# Patient Record
Sex: MALE | Race: BLACK OR AFRICAN AMERICAN | NOT HISPANIC OR LATINO | Employment: OTHER | URBAN - METROPOLITAN AREA
[De-identification: names, ages, dates, MRNs, and addresses within clinical notes are randomized per-mention and may not be internally consistent; named-entity substitution may affect disease eponyms.]

---

## 2018-06-05 ENCOUNTER — HOSPITAL ENCOUNTER (OUTPATIENT)
Dept: RADIOLOGY | Facility: HOSPITAL | Age: 63
Discharge: HOME | End: 2018-06-05
Attending: ORTHOPAEDIC SURGERY
Payer: MEDICAID

## 2018-06-05 DIAGNOSIS — M17.12 DEGENERATIVE ARTHRITIS OF LEFT KNEE: ICD-10-CM

## 2018-06-05 DIAGNOSIS — M17.12 DEGENERATIVE ARTHRITIS OF LEFT KNEE: Primary | ICD-10-CM

## 2018-06-05 PROCEDURE — 73562 X-RAY EXAM OF KNEE 3: CPT | Mod: 26,50,, | Performed by: RADIOLOGY

## 2018-06-05 PROCEDURE — 73562 X-RAY EXAM OF KNEE 3: CPT | Mod: 50,TC,FY

## 2019-11-04 ENCOUNTER — TELEPHONE (OUTPATIENT)
Dept: SURGERY | Facility: HOSPITAL | Age: 64
End: 2019-11-04

## 2019-11-11 ENCOUNTER — LAB VISIT (OUTPATIENT)
Dept: LAB | Facility: OTHER | Age: 64
End: 2019-11-11
Payer: MEDICARE

## 2019-11-11 DIAGNOSIS — B17.9 ACUTE HEPATITIS: ICD-10-CM

## 2019-11-11 DIAGNOSIS — R79.1: ICD-10-CM

## 2019-11-11 DIAGNOSIS — M17.10 PRIMARY LOCALIZED OSTEOARTHROSIS, LOWER LEG: ICD-10-CM

## 2019-11-11 DIAGNOSIS — R73.09 ELEVATED HEMOGLOBIN A1C: ICD-10-CM

## 2019-11-11 DIAGNOSIS — R79.1 ABNORMAL PARTIAL THROMBOPLASTIN TIME (PTT): ICD-10-CM

## 2019-11-11 DIAGNOSIS — Z01.818 OTHER SPECIFIED PRE-OPERATIVE EXAMINATION: Primary | ICD-10-CM

## 2019-11-11 LAB
ALBUMIN SERPL BCP-MCNC: 3.5 G/DL (ref 3.5–5.2)
ALP SERPL-CCNC: 52 U/L (ref 55–135)
ALT SERPL W/O P-5'-P-CCNC: 25 U/L (ref 10–44)
ANION GAP SERPL CALC-SCNC: 7 MMOL/L (ref 8–16)
APTT BLDCRRT: 28.5 SEC (ref 21–32)
AST SERPL-CCNC: 19 U/L (ref 10–40)
BASOPHILS # BLD AUTO: 0.02 K/UL (ref 0–0.2)
BASOPHILS NFR BLD: 0.4 % (ref 0–1.9)
BILIRUB SERPL-MCNC: 0.5 MG/DL (ref 0.1–1)
BUN SERPL-MCNC: 23 MG/DL (ref 8–23)
CALCIUM SERPL-MCNC: 8.6 MG/DL (ref 8.7–10.5)
CHLORIDE SERPL-SCNC: 107 MMOL/L (ref 95–110)
CO2 SERPL-SCNC: 26 MMOL/L (ref 23–29)
CREAT SERPL-MCNC: 1.2 MG/DL (ref 0.5–1.4)
CRP SERPL-MCNC: 2.9 MG/L (ref 0–8.2)
DIFFERENTIAL METHOD: ABNORMAL
EOSINOPHIL # BLD AUTO: 0.1 K/UL (ref 0–0.5)
EOSINOPHIL NFR BLD: 0.9 % (ref 0–8)
ERYTHROCYTE [DISTWIDTH] IN BLOOD BY AUTOMATED COUNT: 12.9 % (ref 11.5–14.5)
ERYTHROCYTE [SEDIMENTATION RATE] IN BLOOD: 2 MM/HR (ref 0–10)
EST. GFR  (AFRICAN AMERICAN): >60 ML/MIN/1.73 M^2
EST. GFR  (NON AFRICAN AMERICAN): >60 ML/MIN/1.73 M^2
ESTIMATED AVG GLUCOSE: 105 MG/DL (ref 68–131)
GLUCOSE SERPL-MCNC: 78 MG/DL (ref 70–110)
HAV IGM SERPL QL IA: NEGATIVE
HBA1C MFR BLD HPLC: 5.3 % (ref 4–5.6)
HBV CORE IGM SERPL QL IA: NEGATIVE
HBV SURFACE AG SERPL QL IA: NEGATIVE
HCT VFR BLD AUTO: 41.7 % (ref 40–54)
HCV AB SERPL QL IA: NEGATIVE
HGB BLD-MCNC: 14.4 G/DL (ref 14–18)
IMM GRANULOCYTES # BLD AUTO: 0.02 K/UL (ref 0–0.04)
IMM GRANULOCYTES NFR BLD AUTO: 0.4 % (ref 0–0.5)
INR PPP: 0.9 (ref 0.8–1.2)
LYMPHOCYTES # BLD AUTO: 0.8 K/UL (ref 1–4.8)
LYMPHOCYTES NFR BLD: 13.4 % (ref 18–48)
MCH RBC QN AUTO: 33.8 PG (ref 27–31)
MCHC RBC AUTO-ENTMCNC: 34.5 G/DL (ref 32–36)
MCV RBC AUTO: 98 FL (ref 82–98)
MONOCYTES # BLD AUTO: 0.7 K/UL (ref 0.3–1)
MONOCYTES NFR BLD: 11.8 % (ref 4–15)
NEUTROPHILS # BLD AUTO: 4.2 K/UL (ref 1.8–7.7)
NEUTROPHILS NFR BLD: 73.1 % (ref 38–73)
NRBC BLD-RTO: 0 /100 WBC
PLATELET # BLD AUTO: 161 K/UL (ref 150–350)
PMV BLD AUTO: 9.9 FL (ref 9.2–12.9)
POTASSIUM SERPL-SCNC: 4.2 MMOL/L (ref 3.5–5.1)
PREALB SERPL-MCNC: 23 MG/DL (ref 20–43)
PROCALCITONIN SERPL IA-MCNC: 0.03 NG/ML
PROT SERPL-MCNC: 6.6 G/DL (ref 6–8.4)
PROTHROMBIN TIME: 10.5 SEC (ref 9–12.5)
RBC # BLD AUTO: 4.26 M/UL (ref 4.6–6.2)
SODIUM SERPL-SCNC: 140 MMOL/L (ref 136–145)
WBC # BLD AUTO: 5.68 K/UL (ref 3.9–12.7)

## 2019-11-11 PROCEDURE — 83036 HEMOGLOBIN GLYCOSYLATED A1C: CPT

## 2019-11-11 PROCEDURE — 85610 PROTHROMBIN TIME: CPT

## 2019-11-11 PROCEDURE — 36415 COLL VENOUS BLD VENIPUNCTURE: CPT

## 2019-11-11 PROCEDURE — 83520 IMMUNOASSAY QUANT NOS NONAB: CPT

## 2019-11-11 PROCEDURE — 80074 ACUTE HEPATITIS PANEL: CPT

## 2019-11-11 PROCEDURE — 85730 THROMBOPLASTIN TIME PARTIAL: CPT

## 2019-11-11 PROCEDURE — 84145 PROCALCITONIN (PCT): CPT

## 2019-11-11 PROCEDURE — 84134 ASSAY OF PREALBUMIN: CPT

## 2019-11-11 PROCEDURE — 85651 RBC SED RATE NONAUTOMATED: CPT

## 2019-11-11 PROCEDURE — 80053 COMPREHEN METABOLIC PANEL: CPT

## 2019-11-11 PROCEDURE — 86140 C-REACTIVE PROTEIN: CPT

## 2019-11-11 PROCEDURE — 85025 COMPLETE CBC W/AUTO DIFF WBC: CPT

## 2019-11-13 ENCOUNTER — OFFICE VISIT (OUTPATIENT)
Dept: FAMILY MEDICINE | Facility: HOSPITAL | Age: 64
End: 2019-11-13
Attending: FAMILY MEDICINE
Payer: MEDICARE

## 2019-11-13 ENCOUNTER — HOSPITAL ENCOUNTER (OUTPATIENT)
Dept: PREADMISSION TESTING | Facility: HOSPITAL | Age: 64
Discharge: HOME | End: 2019-11-13
Attending: ORTHOPAEDIC SURGERY
Payer: MEDICARE

## 2019-11-13 ENCOUNTER — ANESTHESIA EVENT (OUTPATIENT)
Dept: SURGERY | Facility: HOSPITAL | Age: 64
End: 2019-11-13
Payer: MEDICARE

## 2019-11-13 VITALS
WEIGHT: 315 LBS | HEART RATE: 70 BPM | HEIGHT: 73 IN | BODY MASS INDEX: 41.75 KG/M2 | DIASTOLIC BLOOD PRESSURE: 80 MMHG | SYSTOLIC BLOOD PRESSURE: 130 MMHG

## 2019-11-13 DIAGNOSIS — Z01.818 PREOPERATIVE EXAMINATION: ICD-10-CM

## 2019-11-13 DIAGNOSIS — Z01.818 PREOP TESTING: Primary | ICD-10-CM

## 2019-11-13 DIAGNOSIS — Z01.818 PREOP EXAMINATION: Primary | ICD-10-CM

## 2019-11-13 DIAGNOSIS — Z78.9 ALCOHOL USE: ICD-10-CM

## 2019-11-13 DIAGNOSIS — Z23 NEED FOR PROPHYLACTIC VACCINATION AND INOCULATION AGAINST INFLUENZA: ICD-10-CM

## 2019-11-13 DIAGNOSIS — M17.0 PRIMARY OSTEOARTHRITIS OF BOTH KNEES: Primary | ICD-10-CM

## 2019-11-13 PROCEDURE — 99213 OFFICE O/P EST LOW 20 MIN: CPT | Performed by: STUDENT IN AN ORGANIZED HEALTH CARE EDUCATION/TRAINING PROGRAM

## 2019-11-13 PROCEDURE — 90662 IIV NO PRSV INCREASED AG IM: CPT

## 2019-11-13 RX ORDER — HYDROXYZINE HYDROCHLORIDE 25 MG/1
25 TABLET, FILM COATED ORAL NIGHTLY PRN
Refills: 5 | COMMUNITY
Start: 2019-11-05 | End: 2023-01-04

## 2019-11-13 RX ORDER — CELECOXIB 100 MG/1
400 CAPSULE ORAL
Status: CANCELLED | OUTPATIENT
Start: 2019-12-09

## 2019-11-13 RX ORDER — CEFAZOLIN SODIUM 2 G/50ML
2 SOLUTION INTRAVENOUS
Status: CANCELLED | OUTPATIENT
Start: 2019-12-09

## 2019-11-13 RX ORDER — ACETAMINOPHEN 500 MG
1000 TABLET ORAL
Status: CANCELLED | OUTPATIENT
Start: 2019-12-09

## 2019-11-13 RX ORDER — LOSARTAN POTASSIUM 50 MG/1
50 TABLET ORAL
COMMUNITY

## 2019-11-13 RX ORDER — TRANEXAMIC ACID 650 MG/1
1950 TABLET ORAL
Status: CANCELLED | OUTPATIENT
Start: 2019-12-09

## 2019-11-13 RX ORDER — PREGABALIN 75 MG/1
150 CAPSULE ORAL
Status: CANCELLED | OUTPATIENT
Start: 2019-12-09

## 2019-11-13 RX ORDER — TALC
3 POWDER (GRAM) TOPICAL
COMMUNITY
End: 2023-01-04

## 2019-11-13 RX ORDER — SILDENAFIL 100 MG/1
TABLET, FILM COATED ORAL
Refills: 0 | COMMUNITY
Start: 2019-09-25 | End: 2023-01-04

## 2019-11-13 NOTE — DISCHARGE INSTRUCTIONS
Your surgery is scheduled for 12/9/19.    Please report to Procedure Check In Room on the 2nd FLOOR at 5:30a.m.          INSTRUCTIONS IMPORTANT!!!  ¨ Do not eat or drink after 12 midnight-including water. OK to brush teeth, no   gum, candy or mints!          ____  Proceed to [facility] on 11/13/19 for additional testing.      ____  No powder, lotions or creams to surgical area.  ____  Please remove all jewelry, including piercings and leave at home.  ____  No money or valuables needed. Please leave at home.  ____  Please bring any documents given by your doctor.  ____  If going home the same day, arrange for a ride home. You will not be able to             drive if Anesthesia was used.  ____  Wear loose fitting clothing. Allow for dressings, bandages.  ____  Stop Aspirin, Ibuprofen, Motrin and Aleve at least 3-5 days before surgery, unless otherwise instructed by your doctor, or the nurse.   You MAY use Tylenol/acetaminophen until day of surgery.  ____  Wash the surgical area with Hibiclens the night before surgery, and again the             morning of surgery.  Be sure to rinse hibiclens off completely (if instructed by   nurse).  ____  If you take diabetic medication, do not take am of surgery unless instructed by Doctor.  ____  Call MD for temperature above 101 degrees or any other signs of infection such as Urinary (bladder) infection, Upper respiratory infection, skin boils, etc.  ____ Stop taking any Fish Oil supplement or any Vitamins that contain Vitamin E at least 5 days prior to surgery.  ____ Do Not wear your contact lenses the day of your procedure.  You may wear your glasses.      ____Do not shave surgical site for 3 days prior to surgery.  ____ Practice Good hand washing before, during, and after procedure.      I have read or had read and explained to me, and understand the above information.  Additional comments or instructions:  For additional questions call [phone]      ANESTHESIA  SIDE EFFECTS  -For the first 24 hours after surgery:  Do not drive, use heavy equipment, make important decisions, or drink alcohol  -It is normal to feel sleepy for several hours.  Rest until you are more awake.  -Have someone stay with you, if needed.  They can watch for problems and help keep you safe.  -Some possible post anesthesia side effects include: nausea and vomiting, sore throat and hoarseness, sleepiness, and dizziness.        Pre-Op Bathing Instructions    Before surgery, you can play an important role in your own health.    Because skin is not sterile, we need to be sure that your skin is as free of germs as possible. By following the instructions below, you can reduce the number of germs on your skin before surgery.    IMPORTANT: You will need to shower with a special soap called Hibiclens*, available at any pharmacy.  If you are allergic to Chlorhexidine (the antiseptic in Hibiclens), use an antibacterial soap such as Dial Soap for your preoperative shower.  You will shower with Hibiclens both the night before your surgery and the morning of your surgery.  Do not use Hibiclens on the head, face or genitals to avoid injury to those areas.    STEP #1: THE NIGHT BEFORE YOUR SURGERY     1. Do not shave the area of your body where your surgery will be performed.  2. Shower and wash your hair and body as usual with your normal soap and shampoo.  3. Rinse your hair and body thoroughly after you shower to remove all soap residue.  4. With your hand, apply one packet of Hibiclens soap to the surgical site.   5. Wash the site gently for five (5) minutes. Do not scrub your skin too hard.   6. Do not wash with your regular soap after Hibiclens is used.  7. Rinse your body thoroughly.  8. Pat yourself dry with a clean, soft towel.  9. Do not use lotion, cream, or powder.  10. Wear clean clothes.    STEP #2: THE MORNING OF YOUR SURGERY     1. Repeat Step #1.    * Not to be used by people allergic to  Chlorhexidine.          Total Knee Replacement  During total knee replacement surgery, your damaged knee joint is replaced with an artificial joint, called a prosthesis. This surgery almost always reduces joint pain and improves your quality of life.     The parts of the prosthesis are secured to the bones of the knee. Together they form the new joint.   Before your surgery  You will most likely arrive at the hospital on the morning of the surgery. Be sure to follow all of your doctors instructions on preparing for surgery:  · Follow any directions you are given for taking medicines or for not eating or drinking before surgery.  · At the hospital, your temperature, pulse, breathing, and blood pressure will be checked.  · An IV (intravenous) line will be started to provide fluids and medicines needed during surgery.  The surgical procedure  When the surgical team is ready, youll be taken to the operating room. There youll be given anesthesia to help you sleep through surgery, or to make you numb from the waist down. Then an incision is made on the front or side of your knee. Any damaged bone is cleaned away, and the new joint components are put into place. The incision is closed with surgical staples or stitches.  After your surgery  After surgery, youll be sent to the recovery room. When you are fully awake, youll be moved to your room. The nurses will give you medicines to ease your pain. You may have a catheter (small tube) in your bladder. A continuous passive motion machine may be used on your knee to keep it from getting stiff. A sequential compression machine may be used to prevent blood clots by gently squeezing then releasing your leg. You may be given medicine to prevent blood clots. Soon, healthcare providers will help you get up and moving.  When to call your doctor  Once at home, call your doctor if you have any of the symptoms below:  · An increase in knee pain  · Pain or swelling in the calf or  leg  · Unusual redness, heat, or drainage at the incision site  · Fever of 100.4°F (38°C) or higher  · Shaking chills  · Trouble breathing or chest pain (call 911)   Date Last Reviewed: 9/20/2015  © 5675-3619 [employer]. [address] [place] [id number]. All rights reserved. This information is not intended as a substitute for professional medical care. Always follow your healthcare professional's instructions.

## 2019-11-13 NOTE — PRE-PROCEDURE INSTRUCTIONS
Pt states he spoke with Dior on 11/12 to cancel the surgery on 11/25 for the left knee and would be having surgery on 12/9/ on the right knee.  Schedule reflects both surgeries remain scheduled.  Message left for Dior to have the surgery on 11/25 cancelled.

## 2019-11-13 NOTE — PRE-PROCEDURE INSTRUCTIONS
Pt is arranging ride home    Allergies, medical, surgical, family and psychosocial histories reviewed with patient. Periop plan of care reviewed. Preop instructions given, including medications to take and to hold. Hibiclens soap and instructions on use given. Time allotted for questions to be addressed.  Patient verbalized understanding.

## 2019-11-13 NOTE — ANESTHESIA PREPROCEDURE EVALUATION
"                                                                                                             11/13/2019  Sy Aguiar is a 63 y.o., male scheduled for right TKA under spinal/MAC/gen on 12/9/19.    Family med optimization in Epic.  " Patient is low risk for intermediate risk surgery."    Past Medical History:   Diagnosis Date    Asthma     Hypertension      Past Surgical History:   Procedure Laterality Date    BACK SURGERY      ELBOW SURGERY Right     KNEE SURGERY      quad muscle tear repair       Anesthesia Evaluation    I have reviewed the Patient Summary Reports.    I have reviewed the Nursing Notes.   I have reviewed the Medications.     Review of Systems  Anesthesia Hx:  Denies Family Hx of Anesthesia complications.  Personal Hx of Anesthesia complications  Severe Sore Throat after Anesthesia   Social:  Former Smoker, Alcohol Use 3-4 drinks daily   Hematology/Oncology:  Hematology Normal        Cardiovascular:   Hypertension, well controlled  Denies Angina.    Pulmonary:  Pulmonary Normal  Denies Asthma.  Denies Shortness of breath.    Renal/:  Renal/ Normal     Hepatic/GI:  Hepatic/GI Normal    Neurological:  Neurology Normal    Endocrine:  Endocrine Normal        Physical Exam  General:  Obesity    Airway/Jaw/Neck:  Airway Findings: Mouth Opening: Normal Tongue: Normal  General Airway Assessment: Adult  Mallampati: III  TM Distance: Normal, at least 6 cm       Chest/Lungs:  Chest/Lungs Findings: Clear to auscultation, Normal Respiratory Rate     Heart/Vascular:  Heart Findings: Rate: Normal  Rhythm: Regular Rhythm  Sounds: Normal        Mental Status:  Mental Status Findings:  Cooperative, Alert and Oriented         Anesthesia Plan  Type of Anesthesia, risks & benefits discussed:  Anesthesia Type:  general, MAC, regional, spinal  Patient's Preference:   Intra-op Monitoring Plan: standard ASA monitors  Intra-op Monitoring Plan Comments:   Post Op Pain Control Plan: multimodal " analgesia  Post Op Pain Control Plan Comments:   Induction:   IV  Beta Blocker:  Patient is not currently on a Beta-Blocker (No further documentation required).       Informed Consent: Patient understands risks and agrees with Anesthesia plan.  Questions answered. Anesthesia consent signed with patient.  ASA Score: 3     Day of Surgery Review of History & Physical:  There are no significant changes.      Anesthesia Plan Notes: Anesthesia consent to be signed prior to procedure on 12/9/19  As of 11/14, patient has not completed EKG or CXR.  Attempted to contact patient, unable to leave message.          Ready For Surgery From Anesthesia Perspective.

## 2019-11-13 NOTE — H&P (VIEW-ONLY)
Subjective:       Patient ID: Sy Aguiar is a 63 y.o. male.    Chief Complaint: Pre-op Exam    HPI Patient is a 62yo male with PMHx of HTN and Osteoarthritis who presents to the clinic for a pre-operative exam for a TKA on 11/25/2019. Patient has no symptoms today other than chronic bilateral lower extremity swelling. He takes losartan for HTN management. He does not wear compression socks or elevate his legs at night. Patient also states he does drink everyday, sometimes up to 3-4 mixed drinks and wine per night.     Duke Activity Score: 5.31.     Review of Systems   Constitutional: Negative for activity change and fatigue.   HENT: Negative for hearing loss and trouble swallowing.    Eyes: Negative for visual disturbance.   Respiratory: Negative for cough and shortness of breath.    Cardiovascular: Positive for leg swelling (chronic, BLE). Negative for chest pain.   Gastrointestinal: Negative for abdominal pain, constipation, diarrhea, nausea and vomiting.   Genitourinary: Negative for difficulty urinating.   Musculoskeletal: Negative for arthralgias and myalgias.   Neurological: Negative for dizziness, light-headedness, numbness and headaches.   Psychiatric/Behavioral: Negative for decreased concentration and sleep disturbance. The patient is not nervous/anxious.        Objective:      Vitals:    11/13/19 0938   BP: 130/80   Pulse: 70     Physical Exam   Constitutional: He is oriented to person, place, and time. He appears well-developed and well-nourished.   HENT:   Head: Normocephalic and atraumatic.   Eyes: Pupils are equal, round, and reactive to light. Conjunctivae and EOM are normal.   Cardiovascular: Normal rate, regular rhythm, normal heart sounds and intact distal pulses.   Pulmonary/Chest: Effort normal and breath sounds normal.   Abdominal: Soft. Bowel sounds are normal.   Musculoskeletal: Normal range of motion. He exhibits edema (1+ edema bilateral lower extremities).   Neurological: He is  alert and oriented to person, place, and time.   Skin: Skin is warm and dry. Capillary refill takes less than 2 seconds.   Psychiatric: He has a normal mood and affect. His behavior is normal. Judgment and thought content normal.   Vitals reviewed.      Assessment:       1. Primary osteoarthritis of both knees    2. Preoperative examination    3. Need for prophylactic vaccination and inoculation against influenza    4. Alcohol use        Plan:       Primary osteoarthritis of both knees  Preoperative examination        -    Patient is low risk for intermediate risk surgery.         -    Recommended compression socks and elevation of legs while sleeping to help with bilateral lower extremity swelling.     Alcohol Use         -    Discussed alcohol use with patient. CAGE negative. Patient endorses understanding that he drinks above what is recommended and is in the process of decreasing his intake.     Need for prophylactic vaccination and inoculation against influenza  -     Flu Vaccine - High Dose (PF) (65+)    -Patient also needs pneumonia vaccine in the future.     Follow up if symptoms worsen or fail to improve.

## 2019-11-13 NOTE — PROGRESS NOTES
Two patient identifiers verified.  Allergies reviewed. Flu vaccine  IM administered to Left deltoid per MD order.  Patient tolerated injection well; no redness, bleeding, or bruising noted to injection site.  Patient instructed to remain in clinic setting for 15 minutes.  Verbalizes understanding.  Flu consent signed by patient.

## 2019-11-14 ENCOUNTER — HOSPITAL ENCOUNTER (OUTPATIENT)
Dept: RADIOLOGY | Facility: HOSPITAL | Age: 64
Discharge: HOME | End: 2019-11-14
Attending: ORTHOPAEDIC SURGERY
Payer: MEDICARE

## 2019-11-14 ENCOUNTER — CLINICAL SUPPORT (OUTPATIENT)
Dept: LAB | Facility: HOSPITAL | Age: 64
End: 2019-11-14
Attending: ORTHOPAEDIC SURGERY
Payer: MEDICARE

## 2019-11-14 DIAGNOSIS — Z01.818 PREOP EXAMINATION: ICD-10-CM

## 2019-11-14 LAB — IL6 SERPL-MCNC: 2.4 PG/ML

## 2019-11-14 PROCEDURE — 93005 ELECTROCARDIOGRAM TRACING: CPT

## 2019-11-14 PROCEDURE — 71046 XR CHEST PA AND LATERAL: ICD-10-PCS | Mod: 26,,, | Performed by: RADIOLOGY

## 2019-11-14 PROCEDURE — 71046 X-RAY EXAM CHEST 2 VIEWS: CPT | Mod: TC,FY

## 2019-11-14 PROCEDURE — 71046 X-RAY EXAM CHEST 2 VIEWS: CPT | Mod: 26,,, | Performed by: RADIOLOGY

## 2019-11-29 ENCOUNTER — OFFICE VISIT (OUTPATIENT)
Dept: URGENT CARE | Facility: CLINIC | Age: 64
End: 2019-11-29
Payer: MEDICARE

## 2019-11-29 VITALS
WEIGHT: 315 LBS | OXYGEN SATURATION: 97 % | HEIGHT: 73 IN | SYSTOLIC BLOOD PRESSURE: 137 MMHG | RESPIRATION RATE: 20 BRPM | TEMPERATURE: 98 F | DIASTOLIC BLOOD PRESSURE: 81 MMHG | HEART RATE: 71 BPM | BODY MASS INDEX: 41.75 KG/M2

## 2019-11-29 DIAGNOSIS — J20.9 ACUTE BRONCHITIS, UNSPECIFIED ORGANISM: Primary | ICD-10-CM

## 2019-11-29 PROCEDURE — 99203 OFFICE O/P NEW LOW 30 MIN: CPT | Mod: S$GLB,,, | Performed by: NURSE PRACTITIONER

## 2019-11-29 PROCEDURE — 99203 PR OFFICE/OUTPT VISIT, NEW, LEVL III, 30-44 MIN: ICD-10-PCS | Mod: S$GLB,,, | Performed by: NURSE PRACTITIONER

## 2019-11-29 RX ORDER — GUAIFENESIN 600 MG/1
600 TABLET, EXTENDED RELEASE ORAL 2 TIMES DAILY
Qty: 30 TABLET | Refills: 0 | Status: SHIPPED | OUTPATIENT
Start: 2019-11-29 | End: 2023-01-04

## 2019-11-29 RX ORDER — ALBUTEROL SULFATE 90 UG/1
2 AEROSOL, METERED RESPIRATORY (INHALATION) EVERY 6 HOURS PRN
Qty: 1 INHALER | Refills: 0 | Status: SHIPPED | OUTPATIENT
Start: 2019-11-29 | End: 2023-01-04

## 2019-11-29 RX ORDER — PREDNISONE 20 MG/1
20 TABLET ORAL DAILY
Qty: 5 TABLET | Refills: 0 | Status: SHIPPED | OUTPATIENT
Start: 2019-11-29 | End: 2019-12-04

## 2019-11-29 NOTE — PROGRESS NOTES
"Subjective:       Patient ID: Sy Aguiar is a 63 y.o. male.    Vitals:  height is 6' 1" (1.854 m) and weight is 147.9 kg (326 lb) (abnormal). His temperature is 98.1 °F (36.7 °C). His blood pressure is 137/81 and his pulse is 71. His respiration is 20 and oxygen saturation is 97%.     Chief Complaint: Sinus Problem    Pt states for two days he has been having a wheezing in his chest and a cough. He does have a Hx of bronchitist. He is coughing up yellow mucus. Pt hasn't taken anything for the cough.    Sinus Problem   This is a new problem. Episode onset: 2 days ago. The problem is unchanged. There has been no fever. He is experiencing no pain. Associated symptoms include coughing. Pertinent negatives include no chills, congestion, headaches, shortness of breath or sore throat. Past treatments include nothing.       Constitution: Negative for chills, fatigue and fever.   HENT: Negative for congestion and sore throat.    Neck: Negative for painful lymph nodes.   Cardiovascular: Negative for chest pain and leg swelling.   Eyes: Negative for double vision and blurred vision.   Respiratory: Positive for cough. Negative for shortness of breath.    Gastrointestinal: Negative for nausea, vomiting and diarrhea.   Genitourinary: Negative for dysuria, frequency and urgency.   Musculoskeletal: Negative for joint pain, joint swelling, muscle cramps and muscle ache.   Skin: Negative for color change, pale and rash.   Allergic/Immunologic: Negative for seasonal allergies.   Neurological: Negative for dizziness, history of vertigo, light-headedness, passing out and headaches.   Hematologic/Lymphatic: Negative for swollen lymph nodes, easy bruising/bleeding and history of blood clots. Does not bruise/bleed easily.   Psychiatric/Behavioral: Negative for nervous/anxious, sleep disturbance and depression. The patient is not nervous/anxious.        Objective:      Physical Exam   Constitutional: He is oriented to person, " place, and time. He appears well-developed and well-nourished. He is cooperative.  Non-toxic appearance. He does not have a sickly appearance. He does not appear ill. No distress.   HENT:   Head: Normocephalic and atraumatic.   Right Ear: Hearing, tympanic membrane, external ear and ear canal normal.   Left Ear: Hearing, tympanic membrane, external ear and ear canal normal.   Nose: Nose normal. No mucosal edema, rhinorrhea or nasal deformity. No epistaxis. Right sinus exhibits no maxillary sinus tenderness and no frontal sinus tenderness. Left sinus exhibits no maxillary sinus tenderness and no frontal sinus tenderness.   Mouth/Throat: Uvula is midline, oropharynx is clear and moist and mucous membranes are normal. No trismus in the jaw. Normal dentition. No uvula swelling. No oropharyngeal exudate, posterior oropharyngeal edema or posterior oropharyngeal erythema.   Eyes: Conjunctivae and lids are normal. No scleral icterus.   Neck: Trachea normal, full passive range of motion without pain and phonation normal. Neck supple. No neck rigidity. No edema and no erythema present.   Cardiovascular: Normal rate, regular rhythm, normal heart sounds, intact distal pulses and normal pulses.   Pulmonary/Chest: Effort normal and breath sounds normal. No respiratory distress. He has no decreased breath sounds. He has no rhonchi.   Abdominal: Normal appearance.   Musculoskeletal: Normal range of motion. He exhibits no edema or deformity.   Neurological: He is alert and oriented to person, place, and time. He exhibits normal muscle tone. Coordination normal.   Skin: Skin is warm, dry, intact, not diaphoretic and not pale.   Psychiatric: He has a normal mood and affect. His speech is normal and behavior is normal. Judgment and thought content normal. Cognition and memory are normal.   Nursing note and vitals reviewed.        Assessment:       1. Acute bronchitis, unspecified organism        Plan:         Acute bronchitis,  unspecified organism  -     predniSONE (DELTASONE) 20 MG tablet; Take 1 tablet (20 mg total) by mouth once daily. for 5 days  Dispense: 5 tablet; Refill: 0  -     albuterol (VENTOLIN HFA) 90 mcg/actuation inhaler; Inhale 2 puffs into the lungs every 6 (six) hours as needed for Wheezing. Rescue  Dispense: 1 Inhaler; Refill: 0      Patient Instructions   Use an antihistamine such as Claritin, Zyrtec or Allegra to dry you out.     Start prednisone daily for 5 days.  Use albuterol inhaler as needed for shortness of breath and wheezing every 4-6 hours.    Use mucinex (guaifenisin) to break up mucous up to 2400mg/day to loosen any mucous. The mucinex DM pill has a cough suppressant that can be used at night to stop the tickle at the back of your throat.    Use Flonase 1-2 sprays/nostril per day. It is a local acting steroid nasal spray, if you develop a bloody nose, stop using the medication immediately.    Use warm salt water gargles to ease your throat pain. Warm salt water gargles as needed for sore throat-  1/2 tsp salt to 1 cup warm water, gargle as desired. Hot tea with honey.     Drink lots of fluids and get rest.  Patient to follow if symptoms persist or worsen.      Bronchitis, Viral (Adult)    You have a viral bronchitis. Bronchitis is inflammation and swelling of the lining of the lungs. This is often caused by an infection. Symptoms include a dry, hacking cough that is worse at night. The cough may bring up yellow-green mucus. You may also feel short of breath or wheeze. Other symptoms may include tiredness, chest discomfort, and chills.  Bronchitis that is caused by a virus is not treated with antibiotics. Instead, medicines may be given to help relieve symptoms. Symptoms can last up to 2 weeks, although the cough may last much longer.  This illness is contagious during the first few days and is spread through the air by coughing and sneezing, or by direct contact (touching the sick person and then touching  your own eyes, nose, or mouth).  Most viral illnesses resolve within 10 to 14 days with rest and simple home remedies, although they may sometimes last for several weeks.  Home care  · If symptoms are severe, rest at home for the first 2 to 3 days. When you go back to your usual activities, don't let yourself get too tired.  · Do not smoke. Also avoid being exposed to secondhand smoke.  · You may use over-the-counter medicine to control fever or pain, unless another pain medicine was prescribed. (Note: If you have chronic liver or kidney disease or have ever had a stomach ulcer or gastrointestinal bleeding, talk with your healthcare provider before using these medicines. Also talk to your provider if you are taking medicine to prevent blood clots.) Aspirin should never be given to anyone younger than 18 years of age who is ill with a viral infection or fever. It may cause severe liver or brain damage.  · Your appetite may be poor, so a light diet is fine. Avoid dehydration by drinking 6 to 8 glasses of fluids per day (such as water, soft drinks, sports drinks, juices, tea, or soup). Extra fluids will help loosen secretions in the nose and lungs.  · Over-the-counter cough, cold, and sore-throat medicines will not shorten the length of the illness, but they may help to reduce symptoms. (Note: Do not use decongestants if you have high blood pressure.)  Follow-up care  Follow up with your healthcare provider, or as advised. If you had an X-ray or ECG (electrocardiogram), a specialist will review it. You will be notified of any new findings that may affect your care.  Note: If you are age 65 or older, or if you have a chronic lung disease or condition that affects your immune system, or you smoke, talk to your healthcare provider about having pneumococcal vaccinations and a yearly influenza vaccination (flu shot).  When to seek medical advice  Call your healthcare provider right away if any of these occur:  · Fever of  100.4°F (38°C) or higher  · Coughing up increased amounts of colored sputum  · Weakness, drowsiness, headache, facial pain, ear pain, or a stiff neck  Call 911, or get immediate medical care  Contact emergency services right away if any of these occur:  · Coughing up blood  · Worsening weakness, drowsiness, headache, or stiff neck  · Trouble breathing, wheezing, or pain with breathing  Date Last Reviewed: 9/13/2015 © 2000-2017 [employer]. [address] [place] [address]. All rights reserved. This information is not intended as a substitute for professional medical care. Always follow your healthcare professional's instructions.

## 2019-11-29 NOTE — PATIENT INSTRUCTIONS
Use an antihistamine such as Claritin, Zyrtec or Allegra to dry you out.     Start prednisone daily for 5 days.  Use albuterol inhaler as needed for shortness of breath and wheezing every 4-6 hours.    Use mucinex (guaifenisin) to break up mucous up to 2400mg/day to loosen any mucous. The mucinex DM pill has a cough suppressant that can be used at night to stop the tickle at the back of your throat.    Use Flonase 1-2 sprays/nostril per day. It is a local acting steroid nasal spray, if you develop a bloody nose, stop using the medication immediately.    Use warm salt water gargles to ease your throat pain. Warm salt water gargles as needed for sore throat-  1/2 tsp salt to 1 cup warm water, gargle as desired. Hot tea with honey.     Drink lots of fluids and get rest.  Patient to follow if symptoms persist or worsen.      Bronchitis, Viral (Adult)    You have a viral bronchitis. Bronchitis is inflammation and swelling of the lining of the lungs. This is often caused by an infection. Symptoms include a dry, hacking cough that is worse at night. The cough may bring up yellow-green mucus. You may also feel short of breath or wheeze. Other symptoms may include tiredness, chest discomfort, and chills.  Bronchitis that is caused by a virus is not treated with antibiotics. Instead, medicines may be given to help relieve symptoms. Symptoms can last up to 2 weeks, although the cough may last much longer.  This illness is contagious during the first few days and is spread through the air by coughing and sneezing, or by direct contact (touching the sick person and then touching your own eyes, nose, or mouth).  Most viral illnesses resolve within 10 to 14 days with rest and simple home remedies, although they may sometimes last for several weeks.  Home care  · If symptoms are severe, rest at home for the first 2 to 3 days. When you go back to your usual activities, don't let yourself get too tired.  · Do not smoke. Also avoid  being exposed to secondhand smoke.  · You may use over-the-counter medicine to control fever or pain, unless another pain medicine was prescribed. (Note: If you have chronic liver or kidney disease or have ever had a stomach ulcer or gastrointestinal bleeding, talk with your healthcare provider before using these medicines. Also talk to your provider if you are taking medicine to prevent blood clots.) Aspirin should never be given to anyone younger than 18 years of age who is ill with a viral infection or fever. It may cause severe liver or brain damage.  · Your appetite may be poor, so a light diet is fine. Avoid dehydration by drinking 6 to 8 glasses of fluids per day (such as water, soft drinks, sports drinks, juices, tea, or soup). Extra fluids will help loosen secretions in the nose and lungs.  · Over-the-counter cough, cold, and sore-throat medicines will not shorten the length of the illness, but they may help to reduce symptoms. (Note: Do not use decongestants if you have high blood pressure.)  Follow-up care  Follow up with your healthcare provider, or as advised. If you had an X-ray or ECG (electrocardiogram), a specialist will review it. You will be notified of any new findings that may affect your care.  Note: If you are age 65 or older, or if you have a chronic lung disease or condition that affects your immune system, or you smoke, talk to your healthcare provider about having pneumococcal vaccinations and a yearly influenza vaccination (flu shot).  When to seek medical advice  Call your healthcare provider right away if any of these occur:  · Fever of 100.4°F (38°C) or higher  · Coughing up increased amounts of colored sputum  · Weakness, drowsiness, headache, facial pain, ear pain, or a stiff neck  Call 911, or get immediate medical care  Contact emergency services right away if any of these occur:  · Coughing up blood  · Worsening weakness, drowsiness, headache, or stiff neck  · Trouble breathing,  wheezing, or pain with breathing  Date Last Reviewed: 9/13/2015  © 7303-2164 [employer]. [address] [place] [address], [place] [address]. All rights reserved. This information is not intended as a substitute for professional medical care. Always follow your healthcare professional's instructions.

## 2019-12-05 DIAGNOSIS — M17.12 PRIMARY OSTEOARTHRITIS OF LEFT KNEE: Primary | ICD-10-CM

## 2019-12-08 RX ORDER — HYDROCODONE BITARTRATE AND ACETAMINOPHEN 10; 325 MG/1; MG/1
1 TABLET ORAL EVERY 4 HOURS PRN
Status: CANCELLED | OUTPATIENT
Start: 2019-12-08

## 2019-12-08 RX ORDER — PREGABALIN 75 MG/1
75 CAPSULE ORAL 2 TIMES DAILY
Status: CANCELLED | OUTPATIENT
Start: 2019-12-08

## 2019-12-08 RX ORDER — CELECOXIB 100 MG/1
200 CAPSULE ORAL 2 TIMES DAILY
Status: CANCELLED | OUTPATIENT
Start: 2019-12-08

## 2019-12-08 RX ORDER — ACETAMINOPHEN 325 MG/1
650 TABLET ORAL EVERY 6 HOURS SCHEDULED
Status: CANCELLED | OUTPATIENT
Start: 2019-12-09

## 2019-12-08 RX ORDER — ONDANSETRON 2 MG/ML
4 INJECTION INTRAMUSCULAR; INTRAVENOUS EVERY 12 HOURS PRN
Status: CANCELLED | OUTPATIENT
Start: 2019-12-08

## 2019-12-08 RX ORDER — BISACODYL 10 MG
10 SUPPOSITORY, RECTAL RECTAL DAILY
Status: CANCELLED | OUTPATIENT
Start: 2019-12-09

## 2019-12-08 RX ORDER — CEFAZOLIN SODIUM 2 G/50ML
2 SOLUTION INTRAVENOUS
Status: CANCELLED | OUTPATIENT
Start: 2019-12-08 | End: 2019-12-09

## 2019-12-08 RX ORDER — AMOXICILLIN 250 MG
1 CAPSULE ORAL 2 TIMES DAILY
Status: CANCELLED | OUTPATIENT
Start: 2019-12-08

## 2019-12-08 RX ORDER — FAMOTIDINE 20 MG/1
20 TABLET, FILM COATED ORAL 2 TIMES DAILY
Status: CANCELLED | OUTPATIENT
Start: 2019-12-08

## 2019-12-09 ENCOUNTER — ANESTHESIA (OUTPATIENT)
Dept: SURGERY | Facility: HOSPITAL | Age: 64
End: 2019-12-09
Payer: MEDICARE

## 2019-12-09 ENCOUNTER — HOSPITAL ENCOUNTER (OUTPATIENT)
Facility: HOSPITAL | Age: 64
Discharge: HOME | End: 2019-12-09
Attending: ORTHOPAEDIC SURGERY | Admitting: ORTHOPAEDIC SURGERY
Payer: MEDICARE

## 2019-12-09 VITALS
HEART RATE: 60 BPM | TEMPERATURE: 98 F | OXYGEN SATURATION: 98 % | WEIGHT: 315 LBS | RESPIRATION RATE: 16 BRPM | SYSTOLIC BLOOD PRESSURE: 113 MMHG | HEIGHT: 73 IN | BODY MASS INDEX: 41.75 KG/M2 | DIASTOLIC BLOOD PRESSURE: 72 MMHG

## 2019-12-09 DIAGNOSIS — M17.11 ARTHRITIS OF RIGHT KNEE: ICD-10-CM

## 2019-12-09 DIAGNOSIS — Z01.818 PREOP TESTING: ICD-10-CM

## 2019-12-09 LAB
APPEARANCE FLD: CLEAR
BODY FLD TYPE: ABNORMAL
COLOR FLD: YELLOW
LYMPHOCYTES NFR FLD MANUAL: 37 %
MONOS+MACROS NFR FLD MANUAL: 37 %
NEUTROPHILS NFR FLD MANUAL: 21 %
OTHER CELLS FLD MANUAL: 5 %
WBC # FLD: 289 /CU MM

## 2019-12-09 PROCEDURE — 25000003 PHARM REV CODE 250: Performed by: STUDENT IN AN ORGANIZED HEALTH CARE EDUCATION/TRAINING PROGRAM

## 2019-12-09 PROCEDURE — 97165 OT EVAL LOW COMPLEX 30 MIN: CPT

## 2019-12-09 PROCEDURE — C1776 JOINT DEVICE (IMPLANTABLE): HCPCS | Performed by: ORTHOPAEDIC SURGERY

## 2019-12-09 PROCEDURE — S0020 INJECTION, BUPIVICAINE HYDRO: HCPCS | Performed by: ORTHOPAEDIC SURGERY

## 2019-12-09 PROCEDURE — 25000003 PHARM REV CODE 250: Performed by: ORTHOPAEDIC SURGERY

## 2019-12-09 PROCEDURE — 63600175 PHARM REV CODE 636 W HCPCS: Performed by: ORTHOPAEDIC SURGERY

## 2019-12-09 PROCEDURE — 36000711: Performed by: ORTHOPAEDIC SURGERY

## 2019-12-09 PROCEDURE — 27200671 HC STIMUCATH NEEDLE/ CATHETER: Performed by: STUDENT IN AN ORGANIZED HEALTH CARE EDUCATION/TRAINING PROGRAM

## 2019-12-09 PROCEDURE — 89051 BODY FLUID CELL COUNT: CPT

## 2019-12-09 PROCEDURE — 71000039 HC RECOVERY, EACH ADD'L HOUR: Performed by: ORTHOPAEDIC SURGERY

## 2019-12-09 PROCEDURE — 97535 SELF CARE MNGMENT TRAINING: CPT

## 2019-12-09 PROCEDURE — 37000009 HC ANESTHESIA EA ADD 15 MINS: Performed by: ORTHOPAEDIC SURGERY

## 2019-12-09 PROCEDURE — 27200703 HC ULTRASOUND NDL GUIDE: Performed by: STUDENT IN AN ORGANIZED HEALTH CARE EDUCATION/TRAINING PROGRAM

## 2019-12-09 PROCEDURE — 97161 PT EVAL LOW COMPLEX 20 MIN: CPT

## 2019-12-09 PROCEDURE — 37000008 HC ANESTHESIA 1ST 15 MINUTES: Performed by: ORTHOPAEDIC SURGERY

## 2019-12-09 PROCEDURE — 63600175 PHARM REV CODE 636 W HCPCS: Performed by: STUDENT IN AN ORGANIZED HEALTH CARE EDUCATION/TRAINING PROGRAM

## 2019-12-09 PROCEDURE — 71000016 HC POSTOP RECOV ADDL HR: Performed by: ORTHOPAEDIC SURGERY

## 2019-12-09 PROCEDURE — C1713 ANCHOR/SCREW BN/BN,TIS/BN: HCPCS | Performed by: ORTHOPAEDIC SURGERY

## 2019-12-09 PROCEDURE — 71000015 HC POSTOP RECOV 1ST HR: Performed by: ORTHOPAEDIC SURGERY

## 2019-12-09 PROCEDURE — 71000033 HC RECOVERY, INTIAL HOUR: Performed by: ORTHOPAEDIC SURGERY

## 2019-12-09 PROCEDURE — 36000710: Performed by: ORTHOPAEDIC SURGERY

## 2019-12-09 PROCEDURE — 27201423 OPTIME MED/SURG SUP & DEVICES STERILE SUPPLY: Performed by: ORTHOPAEDIC SURGERY

## 2019-12-09 PROCEDURE — 64447 NJX AA&/STRD FEMORAL NRV IMG: CPT | Performed by: STUDENT IN AN ORGANIZED HEALTH CARE EDUCATION/TRAINING PROGRAM

## 2019-12-09 DEVICE — PATELLA NEXGEN ALL-POLY 35MM D: Type: IMPLANTABLE DEVICE | Site: KNEE | Status: FUNCTIONAL

## 2019-12-09 DEVICE — IMPLANTABLE DEVICE: Type: IMPLANTABLE DEVICE | Site: KNEE | Status: FUNCTIONAL

## 2019-12-09 RX ORDER — TRANEXAMIC ACID 100 MG/ML
INJECTION, SOLUTION INTRAVENOUS
Status: DISCONTINUED | OUTPATIENT
Start: 2019-12-09 | End: 2019-12-09

## 2019-12-09 RX ORDER — ONDANSETRON 2 MG/ML
4 INJECTION INTRAMUSCULAR; INTRAVENOUS DAILY PRN
Status: DISCONTINUED | OUTPATIENT
Start: 2019-12-09 | End: 2019-12-09 | Stop reason: HOSPADM

## 2019-12-09 RX ORDER — TRANEXAMIC ACID 100 MG/ML
1000 INJECTION, SOLUTION INTRAVENOUS EVERY 6 HOURS
Status: DISCONTINUED | OUTPATIENT
Start: 2019-12-09 | End: 2019-12-09 | Stop reason: HOSPADM

## 2019-12-09 RX ORDER — BUPIVACAINE HYDROCHLORIDE 7.5 MG/ML
INJECTION, SOLUTION EPIDURAL; RETROBULBAR
Status: COMPLETED | OUTPATIENT
Start: 2019-12-09 | End: 2019-12-09

## 2019-12-09 RX ORDER — SODIUM CHLORIDE 0.9 G/100ML
IRRIGANT IRRIGATION
Status: DISCONTINUED | OUTPATIENT
Start: 2019-12-09 | End: 2019-12-09 | Stop reason: HOSPADM

## 2019-12-09 RX ORDER — CEFAZOLIN SODIUM 2 G/50ML
2 SOLUTION INTRAVENOUS
Status: DISCONTINUED | OUTPATIENT
Start: 2019-12-09 | End: 2019-12-09 | Stop reason: HOSPADM

## 2019-12-09 RX ORDER — HYDROMORPHONE HYDROCHLORIDE 2 MG/ML
0.2 INJECTION, SOLUTION INTRAMUSCULAR; INTRAVENOUS; SUBCUTANEOUS EVERY 5 MIN PRN
Status: DISCONTINUED | OUTPATIENT
Start: 2019-12-09 | End: 2019-12-09 | Stop reason: HOSPADM

## 2019-12-09 RX ORDER — PROPOFOL 10 MG/ML
VIAL (ML) INTRAVENOUS
Status: DISCONTINUED | OUTPATIENT
Start: 2019-12-09 | End: 2019-12-09

## 2019-12-09 RX ORDER — CEPHALEXIN 500 MG/1
500 CAPSULE ORAL EVERY 6 HOURS
Qty: 4 CAPSULE | Refills: 0 | Status: SHIPPED | OUTPATIENT
Start: 2019-12-09 | End: 2019-12-10

## 2019-12-09 RX ORDER — PHENYLEPHRINE HYDROCHLORIDE 10 MG/ML
INJECTION INTRAVENOUS
Status: DISCONTINUED | OUTPATIENT
Start: 2019-12-09 | End: 2019-12-09

## 2019-12-09 RX ORDER — TRANEXAMIC ACID 100 MG/ML
INJECTION, SOLUTION INTRAVENOUS
Status: DISCONTINUED | OUTPATIENT
Start: 2019-12-09 | End: 2019-12-09 | Stop reason: HOSPADM

## 2019-12-09 RX ORDER — OXYCODONE HYDROCHLORIDE 5 MG/1
5 TABLET ORAL
Status: DISCONTINUED | OUTPATIENT
Start: 2019-12-09 | End: 2019-12-09 | Stop reason: HOSPADM

## 2019-12-09 RX ORDER — BUPIVACAINE HYDROCHLORIDE 2.5 MG/ML
INJECTION, SOLUTION INFILTRATION; PERINEURAL
Status: DISCONTINUED | OUTPATIENT
Start: 2019-12-09 | End: 2019-12-09 | Stop reason: HOSPADM

## 2019-12-09 RX ORDER — DEXAMETHASONE SODIUM PHOSPHATE 4 MG/ML
10 INJECTION, SOLUTION INTRA-ARTICULAR; INTRALESIONAL; INTRAMUSCULAR; INTRAVENOUS; SOFT TISSUE ONCE
Status: DISCONTINUED | OUTPATIENT
Start: 2019-12-09 | End: 2019-12-09 | Stop reason: HOSPADM

## 2019-12-09 RX ORDER — SODIUM CHLORIDE, SODIUM LACTATE, POTASSIUM CHLORIDE, CALCIUM CHLORIDE 600; 310; 30; 20 MG/100ML; MG/100ML; MG/100ML; MG/100ML
INJECTION, SOLUTION INTRAVENOUS CONTINUOUS PRN
Status: DISCONTINUED | OUTPATIENT
Start: 2019-12-09 | End: 2019-12-09

## 2019-12-09 RX ORDER — ACETAMINOPHEN 500 MG
1000 TABLET ORAL
Status: DISCONTINUED | OUTPATIENT
Start: 2019-12-09 | End: 2019-12-09 | Stop reason: HOSPADM

## 2019-12-09 RX ORDER — HYDROMORPHONE HYDROCHLORIDE 2 MG/ML
0.5 INJECTION, SOLUTION INTRAMUSCULAR; INTRAVENOUS; SUBCUTANEOUS EVERY 5 MIN PRN
Status: DISCONTINUED | OUTPATIENT
Start: 2019-12-09 | End: 2019-12-09 | Stop reason: HOSPADM

## 2019-12-09 RX ORDER — MIDAZOLAM HYDROCHLORIDE 1 MG/ML
INJECTION, SOLUTION INTRAMUSCULAR; INTRAVENOUS
Status: DISCONTINUED | OUTPATIENT
Start: 2019-12-09 | End: 2019-12-09

## 2019-12-09 RX ORDER — DICLOFENAC SODIUM 75 MG/1
75 TABLET, DELAYED RELEASE ORAL 2 TIMES DAILY
Qty: 28 TABLET | Refills: 0 | Status: SHIPPED | OUTPATIENT
Start: 2019-12-09 | End: 2019-12-23

## 2019-12-09 RX ORDER — PROPOFOL 10 MG/ML
VIAL (ML) INTRAVENOUS CONTINUOUS PRN
Status: DISCONTINUED | OUTPATIENT
Start: 2019-12-09 | End: 2019-12-09

## 2019-12-09 RX ORDER — LIDOCAINE HYDROCHLORIDE 10 MG/ML
1 INJECTION, SOLUTION EPIDURAL; INFILTRATION; INTRACAUDAL; PERINEURAL ONCE
Status: DISCONTINUED | OUTPATIENT
Start: 2019-12-09 | End: 2023-01-04

## 2019-12-09 RX ORDER — PREGABALIN 75 MG/1
150 CAPSULE ORAL
Status: DISCONTINUED | OUTPATIENT
Start: 2019-12-09 | End: 2019-12-09 | Stop reason: HOSPADM

## 2019-12-09 RX ORDER — LIDOCAINE HCL/PF 100 MG/5ML
SYRINGE (ML) INTRAVENOUS
Status: DISCONTINUED | OUTPATIENT
Start: 2019-12-09 | End: 2019-12-09

## 2019-12-09 RX ORDER — OXYCODONE AND ACETAMINOPHEN 5; 325 MG/1; MG/1
1 TABLET ORAL EVERY 12 HOURS PRN
Qty: 20 TABLET | Refills: 0 | Status: SHIPPED | OUTPATIENT
Start: 2019-12-09 | End: 2019-12-19

## 2019-12-09 RX ORDER — CEFAZOLIN SODIUM 1 G/3ML
INJECTION, POWDER, FOR SOLUTION INTRAMUSCULAR; INTRAVENOUS
Status: DISCONTINUED | OUTPATIENT
Start: 2019-12-09 | End: 2019-12-09

## 2019-12-09 RX ORDER — SODIUM CHLORIDE, SODIUM LACTATE, POTASSIUM CHLORIDE, CALCIUM CHLORIDE 600; 310; 30; 20 MG/100ML; MG/100ML; MG/100ML; MG/100ML
INJECTION, SOLUTION INTRAVENOUS CONTINUOUS
Status: ACTIVE | OUTPATIENT
Start: 2019-12-09

## 2019-12-09 RX ORDER — ASPIRIN 81 MG/1
81 TABLET ORAL 2 TIMES DAILY
Qty: 84 TABLET | Refills: 0 | Status: SHIPPED | OUTPATIENT
Start: 2019-12-09 | End: 2023-01-04

## 2019-12-09 RX ORDER — TRANEXAMIC ACID 100 MG/ML
1000 INJECTION, SOLUTION INTRAVENOUS ONCE
Status: COMPLETED | OUTPATIENT
Start: 2019-12-09 | End: 2019-12-09

## 2019-12-09 RX ORDER — CELECOXIB 100 MG/1
400 CAPSULE ORAL
Status: DISCONTINUED | OUTPATIENT
Start: 2019-12-09 | End: 2019-12-09 | Stop reason: HOSPADM

## 2019-12-09 RX ORDER — FAMOTIDINE 20 MG/1
20 TABLET, FILM COATED ORAL 2 TIMES DAILY
Qty: 84 TABLET | Refills: 0 | Status: SHIPPED | OUTPATIENT
Start: 2019-12-09 | End: 2023-01-04

## 2019-12-09 RX ORDER — TRANEXAMIC ACID 650 MG/1
1950 TABLET ORAL
Status: DISCONTINUED | OUTPATIENT
Start: 2019-12-09 | End: 2019-12-09 | Stop reason: HOSPADM

## 2019-12-09 RX ORDER — ACETAMINOPHEN 325 MG/1
325 TABLET ORAL EVERY 4 HOURS
Qty: 84 TABLET | Refills: 0 | Status: SHIPPED | OUTPATIENT
Start: 2019-12-09 | End: 2019-12-23

## 2019-12-09 RX ADMIN — PHENYLEPHRINE HYDROCHLORIDE 150 MCG: 10 INJECTION INTRAVENOUS at 09:12

## 2019-12-09 RX ADMIN — PHENYLEPHRINE HYDROCHLORIDE 100 MCG: 10 INJECTION INTRAVENOUS at 09:12

## 2019-12-09 RX ADMIN — PHENYLEPHRINE HYDROCHLORIDE 200 MCG: 10 INJECTION INTRAVENOUS at 09:12

## 2019-12-09 RX ADMIN — BUPIVACAINE HYDROCHLORIDE 1.6 ML: 7.5 INJECTION, SOLUTION EPIDURAL; RETROBULBAR at 08:12

## 2019-12-09 RX ADMIN — PREGABALIN 150 MG: 75 CAPSULE ORAL at 06:12

## 2019-12-09 RX ADMIN — PHENYLEPHRINE HYDROCHLORIDE 300 MCG: 10 INJECTION INTRAVENOUS at 09:12

## 2019-12-09 RX ADMIN — MIDAZOLAM 1 MG: 1 INJECTION INTRAMUSCULAR; INTRAVENOUS at 08:12

## 2019-12-09 RX ADMIN — CELECOXIB 400 MG: 100 CAPSULE ORAL at 06:12

## 2019-12-09 RX ADMIN — PHENYLEPHRINE HYDROCHLORIDE 50 MCG: 10 INJECTION INTRAVENOUS at 09:12

## 2019-12-09 RX ADMIN — CEFAZOLIN 2 G: 330 INJECTION, POWDER, FOR SOLUTION INTRAMUSCULAR; INTRAVENOUS at 09:12

## 2019-12-09 RX ADMIN — TRANEXAMIC ACID 1000 MG: 100 INJECTION, SOLUTION INTRAVENOUS at 12:12

## 2019-12-09 RX ADMIN — PROPOFOL 100 MCG/KG/MIN: 10 INJECTION, EMULSION INTRAVENOUS at 08:12

## 2019-12-09 RX ADMIN — ACETAMINOPHEN 1000 MG: 500 TABLET ORAL at 06:12

## 2019-12-09 RX ADMIN — LIDOCAINE HYDROCHLORIDE 100 MG: 20 INJECTION, SOLUTION INTRAVENOUS at 08:12

## 2019-12-09 RX ADMIN — TRANEXAMIC ACID 1000 MG: 100 INJECTION, SOLUTION INTRAVENOUS at 10:12

## 2019-12-09 RX ADMIN — PHENYLEPHRINE HYDROCHLORIDE 25 MCG/MIN: 10 INJECTION INTRAVENOUS at 09:12

## 2019-12-09 RX ADMIN — PROPOFOL 50 MG: 10 INJECTION, EMULSION INTRAVENOUS at 08:12

## 2019-12-09 RX ADMIN — TRANEXAMIC ACID 1950 MG: 650 TABLET ORAL at 06:12

## 2019-12-09 RX ADMIN — SODIUM CHLORIDE, SODIUM LACTATE, POTASSIUM CHLORIDE, AND CALCIUM CHLORIDE: .6; .31; .03; .02 INJECTION, SOLUTION INTRAVENOUS at 08:12

## 2019-12-09 NOTE — PLAN OF CARE
Pt returned to OPS with FAMILIA Yan. Pt aaox 3. Respirations even and unlabored. vss (see flow sheet). Dressings to right knee c/d/i. Pt denies pain

## 2019-12-09 NOTE — ANESTHESIA PROCEDURE NOTES
Peripheral Block    Patient location during procedure: post-op   Block not for primary anesthetic.  Reason for block: at surgeon's request and post-op pain management   Post-op Pain Location: R leg   Start time: 12/9/2019 12:07 PM  Timeout: 12/9/2019 12:05 PM   End time: 12/9/2019 12:15 PM    Staffing  Authorizing Provider: Delmar Quijano MD  Performing Provider: Rickey Ray MD    Preanesthetic Checklist  Completed: patient identified, site marked, surgical consent, pre-op evaluation, timeout performed, IV checked, risks and benefits discussed and monitors and equipment checked  Peripheral Block  Patient position: supine  Patient monitoring: heart rate, continuous pulse ox and frequent blood pressure checks  Block type: adductor canal and I PACK  Laterality: right  Injection technique: single shot  Needle  Needle gauge: 21 G  Needle length: 4 in  Needle localization: ultrasound guidance     Assessment  Injection assessment: negative aspiration  Paresthesia pain: none  Heart rate change: no  Slow fractionated injection: no  Additional Notes  Adductor canal: 20cc 0.2% Ropivacaine, 4mg decadron, 50mcg precedex     IPACK: 10cc 1.3% Experel, 10cc 0.25% Bupivacaine

## 2019-12-09 NOTE — PT/OT/SLP EVAL
Physical Therapy Evaluation and Discharge Note    Patient Name:  Sy Aguiar   MRN:  [id number]    Recommendations:     Discharge Recommendations:  outpatient speech therapy   Discharge Equipment Recommendations: bedside commode, walker, rolling, bath bench   Barriers to discharge: None    Assessment:     Sy Aguiar is a 64 y.o. male admitted with a medical diagnosis of Diagnoses of Arthritis of right knee and Preop testing were pertinent to this visit.   .  At this time, patient has met criteria for safe DC Home. Has outpatient PT appointment 12/10.     Recent Surgery: Procedure(s) (LRB):  ARTHROPLASTY, KNEE, SIGHT ASSISTED (Right) Day of Surgery    Plan:     During this hospitalization, patient does not require further acute PT services.  Please re-consult if situation changes.      Subjective     Chief Complaint: generalize weakness  Patient/Family Comments/goals: go home  Pain/Comfort:  · Pain Rating 1: other (see comments)(did not rate on scale)  · Location - Side 1: Right  · Location - Orientation 1: generalized  · Location 1: knee  · Pain Addressed 1: Reposition, Distraction, Pre-medicate for activity    Patients cultural, spiritual, Druze conflicts given the current situation:      Living Environment:  Lives 2nd floor apt with elevator access.   Prior to admission, patients level of function was independent.  Equipment used at home: none.  DME owned (not currently used): single point cane.  Upon discharge, patient will have assistance from family/ significant other..    Objective:     Communicated with primary nurse prior to session.  Patient found HOB elevated with peripheral IV upon PT entry to room.    General Precautions: Standard, fall   Orthopedic Precautions:RLE weight bearing as tolerated   Braces: N/A     Exams:  · RLE ROM: limited knee active and passive  · RLE Strength: fair to poor + control with gait and transitional mvts  · LLE ROM: WFL  · LLE Strength: WFL    Functional  Mobility:  · Bed Mobility:     · Supine to Sit: supervision and stand by assistance  · Sit to Supine: supervision and stand by assistance  · Transfers:     · Sit to Stand:  stand by assistance and contact guard assistance with rolling walker  · Gait: 20 and 40 on trial 1 and 2 respectively using RW and CG assist  · Balance: poor + to fair with RW    AM-PAC 6 CLICK MOBILITY  Total Score:21       Therapeutic Activities and Exercises:   Instructed in passive ext , elevation and ICE    AM-PAC 6 CLICK MOBILITY  Total Score:21     Patient left sitting EOB with primary nurse with all lines intact and call button in reach.    GOALS:   Multidisciplinary Problems     Physical Therapy Goals     Not on file          Multidisciplinary Problems (Resolved)        Problem: Physical Therapy Goal    Goal Priority Disciplines Outcome Goal Variances Interventions   Physical Therapy Goal   (Resolved)     PT, PT/OT Met     Description:  Goals to be met by: 12/9/2019     No PT established                          History:     Past Medical History:   Diagnosis Date    Asthma     Hypertension        Past Surgical History:   Procedure Laterality Date    BACK SURGERY      ELBOW SURGERY Right     KNEE SURGERY      quad muscle tear repair       Time Tracking:     PT Received On: 12/09/19  PT Start Time: 1405   2nd trial 1534   PT Stop Time: 1425                  1546  PT Total Time (min): 20 min     Billable Minutes: Evaluation 20      Shubham Montaño, PT  12/09/2019

## 2019-12-09 NOTE — DISCHARGE INSTRUCTIONS
Postop Instructions    1. Enteric coated Aspirin 81mg by mouth twice per day for 6 weeks to prevent blood clots, unless otherwise indicted.  2. Please take a stomach reflux medication (such as Pepcid, Prevacid, or Nexium) while on Aspirin to prevent stomach ulcers.  You will be given a prescription for Pepcid.  3. Edmund stockings should be worn as much as possible for 6 weeks to prevent blood clots.  4. Do not start antibiotics for any suspected infections related to the surgery until evaluated by Dr. Vigil's staff.  Four doses of Keflex will be prescribed after surgery.  5. No driving for approx 2-4 weeks.  6. You can shower once the incision is completely dry, otherwise place a new dressing twice a day if there is drainage.  Please call the office if drainage increases after discharge.  7. You may resume all pre-surgery medications unless otherwise indicated.  8. All patients should be seen in Dr. Vigil's office approx 2 weeks after surgery.  9. Dr. Vigil prefers outpatient physical therapy upon discharge home.  If home PT is needed, please contact Dr. Vigil for approval.  10. Patients should see their primary care doctor after discharge home.  11. If you are sent to a rehab/nursing facility please notify Dr. Vigil's office once discharged.  12. Pain medication on discharge.  You will be given a prescription for Percocet 5/325 mg.  You may take this every 12 hours as needed for the first two weeks.  You will also be given Diclofenac which you should take twice daily for 2 weeks.  Tylenol prescription will also be given which should be taken every 4 hours for 2 weeks.  13. Please take a stomach reflux medication (such as Pepcid, Prevacid, or Nexium) while on antiinflammatory medications (such as Advil, Motrin, or Aleve) to prevent stomach ulcers or gastrointestinal upset twice a day for 14 days.

## 2019-12-09 NOTE — PLAN OF CARE
"VSS. Denies pain or discomfort @ this time. Dr Cooper updated on PACU bp/hr events. States "Ok to release to room". Report given to FAMILIA Richter, with time allotted for questions.   "

## 2019-12-09 NOTE — TRANSFER OF CARE
"Anesthesia Transfer of Care Note    Patient: Sy Aguiar    Procedure(s) Performed: Procedure(s) (LRB):  ARTHROPLASTY, KNEE, SIGHT ASSISTED (Right)    Patient location: PACU    Anesthesia Type: spinal and MAC    Transport from OR: Transported from OR on 6-10 L/min O2 by face mask with adequate spontaneous ventilation    Post pain: adequate analgesia    Post assessment: no apparent anesthetic complications and tolerated procedure well    Post vital signs: stable    Level of consciousness: awake, alert and oriented    Nausea/Vomiting: no nausea/vomiting    Complications: none    Transfer of care protocol was followed      Last vitals:   Visit Vitals  BP (!) 94/58   Pulse 73   Temp 36.6 °C (97.9 °F) (Temporal)   Resp 18   Ht 6' 1" (1.854 m)   Wt (!) 144.7 kg (319 lb)   SpO2 97%   BMI 42.09 kg/m²     "

## 2019-12-09 NOTE — ANESTHESIA PROCEDURE NOTES
Spinal    Diagnosis: Right Knee Pain   Patient location during procedure: OR  Start time: 12/9/2019 8:56 AM  Timeout: 12/9/2019 8:55 AM  End time: 12/9/2019 8:58 AM    Staffing  Authorizing Provider: Delmar Quijano MD  Performing Provider: Zak Person MD    Preanesthetic Checklist  Completed: patient identified, site marked, surgical consent, pre-op evaluation, timeout performed, IV checked, risks and benefits discussed and monitors and equipment checked  Spinal Block  Patient position: sitting  Prep: ChloraPrep  Patient monitoring: heart rate, continuous pulse ox, frequent blood pressure checks and continuous capnometry  Approach: midline  Location: L3-4  Injection technique: single shot  CSF Fluid: clear free-flowing CSF  Needle  Needle type: pencil-tip   Needle gauge: 22 G  Needle length: 5 in  Additional Documentation: incremental injection, negative aspiration for heme and no paresthesia on injection  Needle localization: anatomical landmarks

## 2019-12-09 NOTE — PLAN OF CARE
Problem: Physical Therapy Goal  Goal: Physical Therapy Goal  Description  Goals to be met by: 12/9/2019     No PT established         Outcome: Met   Patient has met criteria for safe DC home.   Outpatient PT scheduled for 12/10  Will DC PT service

## 2019-12-09 NOTE — PLAN OF CARE
Discharge instructions reviewed with pt and pt's family. Pt and pt's family verbalize understanding. PIV discontinued as per md order. Tip intact. Pt tolerated well.

## 2019-12-09 NOTE — DISCHARGE SUMMARY
Physician Discharge Summary     Patient Id:  Sy Aguiar  [id number]  [id number]    Admit date: 12/9/2019     Discharge date: 12/9/19    Admitting Physician: Urbano Vigil MD    Discharge Physician: Urbano Vigil MD    Admission Diagnoses: Osteoarthritis of right knee [M17.11]  Arthritis of right knee [M17.11]     Discharge Diagnoses: right TKA    Admission Condition: good    Discharged Condition: good    Indication for Admission: right TKA    Hospital Course: Patient presented to [facility] on day of scheduled surgery and underwent right total knee arthroplasty, please see operative report for further detail. Patient did well postoperatively and was found to be fit for discharge on POD# 0.  The patient mobilized with physical therapy.  They were taught how to use DME appropriately.  Their pain was controlled.  The patient met all discharge criteria.  The patient was discharged home in stable condition. Patient was given paper prescriptions.  They were given a follow-up appointment in 2 weeks in clinic for a wound check and range of motion check.  All questions and concerns of the patient were answered to their satisfaction.     Consults: None    Significant Diagnostic Studies: None applicable      Treatments: surgery: right TKA     Disposition: home     Patient Active Problem List    Diagnosis Date Noted    Arthritis of right knee 12/09/2019        Patient Instructions:      Medication List      START taking these medications    aspirin 81 MG EC tablet  Commonly known as:  ECOTRIN  Take 1 tablet (81 mg total) by mouth 2 (two) times daily.     cephALEXin 500 MG capsule  Commonly known as:  KEFLEX  Take 1 capsule (500 mg total) by mouth every 6 (six) hours. for 4 doses     Colace Clear 50 MG capsule  Generic drug:  docusate sodium  Take 1 capsule (50 mg total) by mouth 2 (two) times daily.     diclofenac 75 MG EC tablet  Commonly known as:  VOLTAREN  Take 1 tablet (75 mg total) by mouth 2 (two) times daily. for  14 days     famotidine 20 MG tablet  Commonly known as:  PEPCID  Take 1 tablet (20 mg total) by mouth 2 (two) times daily.     oxyCODONE-acetaminophen 5-325 mg per tablet  Commonly known as:  PERCOCET  Take 1 tablet by mouth every 12 (twelve) hours as needed for Pain.        CONTINUE taking these medications    albuterol 90 mcg/actuation inhaler  Commonly known as:  Ventolin HFA  Inhale 2 puffs into the lungs every 6 (six) hours as needed for Wheezing. Rescue     guaiFENesin 600 mg 12 hr tablet  Commonly known as:  MUCINEX  Take 1 tablet (600 mg total) by mouth 2 (two) times daily.     hydrOXYzine HCl 25 MG tablet  Commonly known as:  ATARAX     losartan 50 MG tablet  Commonly known as:  COZAAR     melatonin  Commonly known as:  MELATIN     sildenafil 100 MG tablet  Commonly known as:  VIAGRA        ASK your doctor about these medications    * acetaminophen 325 MG tablet  Commonly known as:  TYLENOL  Take 1 tablet (325 mg total) by mouth every 4 (four) hours. for 14 days  Ask about: Which instructions should I use?     * acetaminophen 325 MG tablet  Commonly known as:  TYLENOL  Take 1 tablet by mouth every 4 hours for 14 days  Ask about: Which instructions should I use?         * This list has 2 medication(s) that are the same as other medications prescribed for you. Read the directions carefully, and ask your doctor or other care provider to review them with you.               Where to Get Your Medications      These medications were sent to [facility] [address] [address]    Hours:  Mon-Fri, 8a-5:30p Phone:  [phone]   · acetaminophen 325 MG tablet  · aspirin 81 MG EC tablet  · cephALEXin 500 MG capsule  · Colace Clear 50 MG capsule  · diclofenac 75 MG EC tablet  · famotidine 20 MG tablet  · oxyCODONE-acetaminophen 5-325 mg per tablet     You can get these medications from any pharmacy    Bring a paper prescription for each of these medications  · acetaminophen 325 MG  tablet          .Post Op Total Knee Arthroplasty Instructions     1. Enteric coated aspirin 81 mg by mouth twice a day for 6 weeks to prevent blood clots,  unless otherwise indicated.  2. Please take a stomach reflux medication such as pepcid, prevacid, nexium (H-2 blocker or PPI) while on aspirin to prevent stomach ulcers. You will be given a prescription for pepcid.  3. Edmund stockings should be worn as much as possible for 6 weeks to prevent blood clots.  4. Do not start antibiotics for any suspected infections related to the surgery until evaluated by dr armando staff. 4 doses of Keflex will be prescribed after surgery.   5. No driving for approximately 2-4 weeks  6. You can shower once the incision is completely dry, otherwise place a new dry dressing twice a day if there is drainage. Please call the office if the drainage increases after discharge.  7. You may resume all pre-surgery medications unless otherwise indicated  8. All patients should be seen in Dr Armando office approx 2 weeks after surgery  9. Dr Vigil prefers outpatient physical therapy upon discharge home. If home PT is needed, please contact Dr Vigil for approval  10. Patients should see their primary care doctor after discharge home  11. If you are sent to a rehab/nursing facility please notify Dr armando office once discharged.  12. Pain medication on discharge. You will be given a prescription for Percocet 5-325 mg. You may take this every 12 hours as needed for the first two weeks. You will also be given Diclofenac which you should take twice daily for 2 weeks. Tylenol prescription will also be given which should be taken every 4 hours for 2 weeks.   13. Please take a stomach reflux medication such as pepcid, prevacid, nexium, etc. (H-2 blocker or PPI) while on anti inflammatory medications such as advil, aleve, motrin, etc. to prevent stomach ulcers or gastrointestinal upset        Discussed plan with patient and answered questions: Yes        Corinne E Cloud, MD    [facility], PGY-3  Pager: [phone]

## 2019-12-09 NOTE — PT/OT/SLP EVAL
Occupational Therapy   Evaluation/Discharge    Name: Sy Aguiar  MRN: [id number]  Admitting Diagnosis:  <principal problem not specified> Day of Surgery    Recommendations:     Discharge Recommendations: outpatient PT  Discharge Equipment Recommendations:  bedside commode, bath bench, walker, rolling  Barriers to discharge:  None    Assessment:     Sy Aguiar is a 64 y.o. male with a medical diagnosis of <principal problem not specified>.  He presents with performance deficits affecting function: weakness, impaired self care skills, impaired balance, decreased ROM, impaired endurance, impaired functional mobilty, gait instability, decreased lower extremity function, edema, pain.      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  Patient found HOB elevated with peripheral IV upon OT entry to room.    General Precautions: Standard, fall   Orthopedic Precautions:    Braces:       Occupational Performance:    Bed Mobility:    · Patient completed Rolling/Turning to Left with   modified independence and supervision  · Patient completed Scooting/Bridging with modified independence and supervision  · Patient completed Supine to Sit with modified independence and supervision  · Patient completed Sit to Supine with modified independence and supervision    Functional Mobility/Transfers:  · Patient completed Sit <> Stand Transfer with stand by assistance  with  rolling walker   · Functional Mobility: SBA-CGA w/RW    Activities of Daily Living:  · Upper Body Dressing: modified independence    · Lower Body Dressing: stand by assistance      Cognitive/Visual Perceptual:  AO4    Physical Exam:  BUE AROM/strength WFL  Good sit balance, fair+ stand balance    AMPAC 6 Click ADL:  AMPAC Total Score: 21    Treatment & Education:  Pt participating in initial OT/PT evaluations this date. Pt educated on adaptive dsg post surgery, home safety, car/toilet/shower/tub t/fs, use of DME for functional mobility and ADLs. Pt to attend OP PT tomorrow's date. D/c OT     Education:    Patient left seated EOb with all lines intact, call button in reach and nurse and girlfriend present    GOALS:   Multidisciplinary Problems     Occupational Therapy Goals     Not on file          Multidisciplinary Problems (Resolved)        Problem: Occupational Therapy Goal    Goal Priority Disciplines Outcome Interventions   Occupational Therapy Goal   (Resolved)     OT, PT/OT Met                    History:     Past Medical History:   Diagnosis Date    Asthma     Hypertension        Past Surgical History:   Procedure Laterality Date    BACK SURGERY      ELBOW SURGERY Right     KNEE SURGERY      quad muscle tear repair       Time Tracking:     OT Date of Treatment: 12/09/19  OT Start Time: 1407  OT Stop Time: 1427  OT Total Time (min): 20 min  & [id number] (12    Billable Minutes:Evaluation 15  Self Care/Home Management 8    Oskar Chua OT  12/9/2019

## 2019-12-09 NOTE — OP NOTE
Procedure Note Michele TKA:      DATE OF PROCEDURE:12/9/2019     PREOPERATIVE DIAGNOSIS: right knee bone-on-bone osteoarthritis, morbid obesity.     POSTOPERATIVE DIAGNOSIS: right knee bone-on-bone osteoarthritis, morbid obesity.     PROCEDURE: difficult Computer-assisted right total knee arthroplasty with resurfaced patella because of body size.     ATTENDING SURGEON: Urbano Vigil M.D.   ASSISTANT: Dr. Kirk     Risk Adjustment: Please see media tab for any additional diagnoses faxed from my office related to theTKA.    COMPLICATIONS: None.     IMPLANTS:   1. michele next gen tibial tray, size 7  2. michele next gen Femoral component, size H  right  3. michele next gen all-poly patella, size 35    4. michele next gen posterior stabilized highly crosslinked  polyethylene, 10 mm height.   5. antibiotic bone cement x2     INDICATIONS FOR PROCEDURE: This is a 64 y.o. male with longstanding knee pain. They have failed nonoperative management including injections. Risks and benefits of total knee arthroplasty were explained to the patient. The patient agreed to move forward with total knee arthroplasty. difficult case throughout because of the difficulty to get the flexion needed to perform some of the steps.     FINDINGS: The patient had a complete articular cartilage loss down to subchondral bone throughout the knee.     PROCEDURE IN DETAIL: The patient had adductor nerve block prior to entry to the OR. The patient was then brought to the Operating Room and spinal anesthesia started without any difficulties. The patient was placed supine on the operative table. Duran catheter was then placed and removed at the end of the case. Right knee was then prepped and draped in sterile fashion. Prior to incision, proper site and procedure as well as antibiotic administration was verified. AFCN and ISN nerves were then injected with marcaine. Anterior midline incision was created overlying center patella proximally to the  medial border of the tibial tubercle distally. Skin, subcutaneous fat and deep fascial layer were sharply incised until we came to extensor mechanism retinaculum. Flap was then elevated medially to VMO and laterally to lateral border of patella. Knee was then aspirated and synovial fluid sent for cell count. Medial parapatellar arthrotomy was injected with Marcaine with epinephrine and a medial parapatellar arthrotomy was then created. Synovium overlying the anterolateral distal femur was then excised as well as the infrapatellar tendon fat pad. Sleeve of soft tissue was elevated off the proximal medial tibia. The patella was so thick and the patient so large, that I felt we would have trouble w exposure and retracting if we did not resect the patella at the beginning of the case. the Periphery of the patella was denervated using bovie cautery and We then everted our patella and measured our patellar  thickness for 30 mm. We then resected down to approximately 16 mm of remaining bone to improve exposure. Hohmann retractors then placed medially and laterally along the distal femur to protect the collateral ligaments. ACL and anterior horn of lateral meniscus were then transected. We then pinned our navigation tracker on to distal femur and then performed our anatomy survey for the femur. We placed distal femoral cutting guide such that we were perpendicular to mechanical axis, aiming for about 1 degree of flexion and about 9 mm of bony resection. Distal femur was then resected. Next, we placed AP sizing guide on distal femur and measured for a size H femoral component. difficult to get the needed flexion to get the sizing guide under the posterior condyles. We then emma out Whitesides line and placed our AP cutting block such that we were perpendicular to Whitesides line and created 2 pin holes in 3  degrees of external rotation relative to the posterior condylar axis.being parallel to transepicondylar axis and  perpidicualar to gueras line. Resected posterior femoral bone measured approx. 3 mm in difference with the lateral piece thinner than the medial piece. Next, we subluxed the tibia forward and resected medial and lateral menisci (difficult to get the needed flexion because of body size, calf was hitting thigh). We then pinned our navigation tracker on to the proximal tibia and then performed our anatomy survey for tibia. We placed our proximal tibial cutting guide such that we were perpendicular to mechanical axis, aiming for about 1 to 2 mm of bony resection medially and about 5 degrees posterior slope. Proximal tibial bone was then resected and detached from posterior soft tissues using Bovie cautery. Next, with knee at 90 degrees, we placed a laminar  in lateral compartment and resected the ACL and the PCL as well as small osteophytes posteriorly along the femur. We then injected the posterior capsule with marcaine. We then assessed our gaps using a 12 mm spacer block. With a 12 mm spacer block, the knee came out to full extension with nice stability with varus and valgus stress, and nice symmetry between flexion and extension. We assessed our tibial cut and our alignment burt fell within the center of the ankle. Once we were happy with soft tissue sleeves and bony cuts, we then placed tibial protector on the proximal tibia and our chamfer cutting guide for the femur. We then created our chamfer cuts. We then placed our box cutting guide as lateral as possible while maintaining a symmetric condylar resection and reamed for our box cut. Next, we subluxed the tibia forward, and sized our proximal tibia for a size 7 baseplate, the center of which was rotated such that it was in line with medial third tibial tubercle. This was then pinned in place. We then trialed using a 12 trial polyethylene. With 12 mm polyethylene, the knee came out to full extension. We had nice stability with varus and valgus stress  and nice symmetry between flexion and extension.  We then everted the patella and sized the patella for a 35 patellar button. Three peg holes were then drilled for the patellar button and a patellar trial was then placed. We then assessed our tracking. Patella tracked centrally within trochlear groove. Once we were happy with all our trial components, we then removed all our trial components, except the tibial baseplate. We then reamed and broached for the tibial baseplate. We then drilled the sclerotic bone along the tibia using a short (4mm) drill bit as well. We then placed the knee in extension and examined the posterior aspect of knee for bleeding. Once we achieve hemostasis, we then packed the knee and inflated the tourniquet. We then prepared our bony surfaces for cementation using pulse lavage antibiotic saline. Femoral component cement was then placed using a pressurized cement gun and then the femoral component impacted and all excess bony cement removed from the periphery and box. Cement gun was then used to cement the tibial bone and then the tibial component was impacted using direct impaction and all excess bony cement was removed from the periphery.We then reduced the knee, everted the patella and cemented the patellar component on last. We then paused to allow for cement to harden. Once cement was hardened, we then let the tourniquet down and reexamined our gaps, stability and tracking; all of which remained unchanged. We then copiously irrigated the knee with pulse lavage betadine saline. We then closed our medial parapatellar arthrotomy with a running #2 barbed suture, subcutaneous deep fascial layer was closed with simple interrupted # 1 vicryl suture, subcutaneous skin with 2-0 Vicryl and incision with Dermabond and Steri-Strips, was then dressed with silver water proof dressing. The patient was then transferred to Recovery Room bed in stable condition.

## 2019-12-09 NOTE — INTERVAL H&P NOTE
The patient has been examined and the H&P has been reviewed:    I concur with the findings and no changes have occurred since H&P was written.    Anesthesia/Surgery risks, benefits and alternative options discussed and understood by patient/family.          Active Hospital Problems    Diagnosis  POA    Arthritis of right knee [M17.11]  Yes      Resolved Hospital Problems   No resolved problems to display.

## 2019-12-09 NOTE — PLAN OF CARE
Pt states name and . Verified to armband. Pt verifies procedure to be done. Verified to consent x2 and EPIC chart. Placed on cardiac monitor x3, pulse ox. Time out done.     1205: nerve block started Right leg      1208: nerve block complete. VSS. Cont to monitor.

## 2019-12-09 NOTE — PLAN OF CARE
BP and heart rate decreasing. Pt laid flat, fluids opened. Awake and alert. Dr Quijano notified. No orders rec'd.

## 2019-12-10 ENCOUNTER — CLINICAL SUPPORT (OUTPATIENT)
Dept: REHABILITATION | Facility: HOSPITAL | Age: 64
End: 2019-12-10
Attending: ORTHOPAEDIC SURGERY
Payer: MEDICARE

## 2019-12-10 DIAGNOSIS — M25.561 ACUTE PAIN OF RIGHT KNEE: ICD-10-CM

## 2019-12-10 DIAGNOSIS — M25.661 DECREASED RANGE OF MOTION OF RIGHT KNEE: ICD-10-CM

## 2019-12-10 DIAGNOSIS — Z74.09 IMPAIRED FUNCTIONAL MOBILITY, BALANCE, GAIT, AND ENDURANCE: ICD-10-CM

## 2019-12-10 LAB — PATH INTERP FLD-IMP: NORMAL

## 2019-12-10 PROCEDURE — G8979 MOBILITY GOAL STATUS: HCPCS | Mod: CK,PN

## 2019-12-10 PROCEDURE — G8978 MOBILITY CURRENT STATUS: HCPCS | Mod: CK,PN

## 2019-12-10 PROCEDURE — 97161 PT EVAL LOW COMPLEX 20 MIN: CPT | Mod: PN

## 2019-12-10 PROCEDURE — 97140 MANUAL THERAPY 1/> REGIONS: CPT | Mod: PN

## 2019-12-10 PROCEDURE — 97110 THERAPEUTIC EXERCISES: CPT | Mod: PN

## 2019-12-10 NOTE — PATIENT INSTRUCTIONS
Strengthening: Quadriceps Set    Tighten muscles on top of thighs by pushing knees down into surface. Hold 5 seconds.  Repeat 10 times each leg per set. Do 2 sets per session. Do 2 sessions per day.      Straight Leg Raise     With right leg straight, other leg bent, raise straight leg until knees are even. Slowly lower. Roll on your side and repeat lifting top leg up, on other side, lifting bottom leg up, and on stomach kicking back (squeezing your rear end before you kick back).  Repeat 10 times each leg per set. Do 2 sets per session. Do 2 sessions per day.       Glute Squeeze, supine    Lie face up and squeeze your rear end. Do not tighten your abdominals or hold your breath. Hold 5 seconds. Relax.  Repeat 10 times per set. Do 2 sets per session. Do 2 sessions per day.      Slow Stand to Sit    Stand up, using hands if needed. Keep chest and head upright, bend your knees, don't use hands, and slowly lower back to the chair. Move as slowly as you can.  Repeat 10 times per set. Do 2 sets per session. Do 2 sessions per day.      Copyright © [employer]. All rights reserved.

## 2019-12-10 NOTE — PLAN OF CARE
[facility]  Physical Therapy Initial Evaluation    Name: Sy Aguiar  Clinic Number: [id number]    Therapy Diagnosis:   Encounter Diagnoses   Name Primary?    Acute pain of right knee     Impaired functional mobility, balance, gait, and endurance     Decreased range of motion of right knee      Physician: Urbano Vigil MD    Physician Orders: PT Eval and Treat  Medical Diagnosis from Referral: M17.12 (ICD-10-CM) - Primary osteoarthritis of left knee  Evaluation Date: 12/10/2019  Authorization Period Expiration: 1/26/2020  Plan of Care Expiration: 1/24/2020  Visit # / Visits authorized: 1/12  FOTO: 1/10    Gcode: 1/10  Visit:  140.66  Total: 140.66    Time In: 3:00 PM  Time Out: 4:00 PM  Total Billable Time: 40 minutes (Low Complexity Evaluation, Therapeutic Exercise - 1, Manual Therapy - 1)    Precautions: Standard, Right TKA on 12/9/2019    Subjective     Date of onset: DOS: ~10 years ago    History of current condition - Sy reports: he had a right TKA on 12/9/2019.       Past Medical History:   Diagnosis Date    Asthma     Hypertension      Sy Aguiar  has a past surgical history that includes Back surgery; Elbow surgery (Right); and Knee surgery.    Sy has a current medication list which includes the following prescription(s): acetaminophen, acetaminophen, albuterol, aspirin, cephalexin, diclofenac, docusate sodium, famotidine, guaifenesin, hydroxyzine hcl, losartan, melatonin, oxycodone-acetaminophen, and sildenafil, and the following Facility-Administered Medications: lactated ringers and lidocaine (pf) 10 mg/ml (1%).    Review of patient's allergies indicates:  No Known Allergies     Imaging: X-Ray Right Knee (12/9/2019): Status post right knee arthroplasty with radiolucent tibial component.  Otherwise, appropriate anatomic alignment.  Intra-articular and subcutaneous gas with knee effusion, likely postprocedural.  No acute fracture.    Prior Therapy:  "No  Social History: Lives alone; 2nd floor apartment, uses elevators  Occupation: Musician   Prior Level of Function: Independent with pain and slowness of movement  Current Level of Function: Dependent upon transportation    Pain:  Current 0/10, worst 7/10, best 0/10   Location: right knee   Description: Stiffness  Aggravating Factors: Laying down in awkward positions and when he first gets out of the bed  Easing Factors: pain medication, ice and elevation    Pts goals: "To get back on my feet and feel comfortable."    Objective     Posture: Rounded shoulders, forward head  Palpation: Tender to palpation at lateral right knee joint    Knee AROM/PROM Right Left Pain/Dysfunction with Movement      Knee Flexion 64//115    Knee Extension -12/NT 0/0    *denotes pain with movement  *NT = not tested    L/E Strength w/ MicroFET Muscle Luz Dynamometer Right Left Pain/Dysfunction with Movement   (approx 4 sec hold w/ max contraction)   Hip Flexion 7.2 kg  18.6 kg     Hip Abduction 13.4 kg  14.6 kg     Quadriceps 10.9 kg  23.1 kg     Hamstrings 9.7 kg  15.7 kg       TU seconds (w/ RW)    30 second sit-to-stand test (without U/E support): 0 (10 w/ UE support)    KOOS Knee Survey:    Symptoms:  Pain: 36  Functional, Daily Livin/68 (86.7% disabled)  Function, Sports and Recreational Activities:   Quality of Life:     PROMIS-29:    Physical Function:    Anxiety:    Depression:     Fatigue:     Sleep Disturbance:     Satisfaction with Social Role:  15/20   Pain Interference:  15/20   Pain Intensity:  10    Gait Analysis: with RW: Slowed juan miguel with decreased heel strike and hip flexion on right, decreased stance time on the right    CMS Impairment/Limitation/Restriction for KOOS, Functional, Daily Living    Therapist reviewed KOOS scores for Sy Aguiar on 12/10/2019.   KOOS documents entered into [employer] - see Media section.    Limitation Score: 86.7%  Category: " "Mobility    Current : CK = at least 40% but < 60% impaired, limited or restricted  Goal: CK = at least 40% but < 60% impaired, limited or restricted    Limitation for FOTO Knee Survey  Limitation Score: 56%         TREATMENT     Treatment Time In: 3:35 PM  Treatment Time Out: 4:00 PM  Total Treatment time separate from Evaluation: 25 minutes    Sy received therapeutic exercises to develop strength and endurance for 15 minutes including:  Quad Sets: 1x10, 5" holds  SLRs: 1x10 RLE only  Glute Squeezes: 1x10, 5" holds  Slow Sit to Stands: 1x10 from elevated high low mat    Sy received the following manual therapy techniques: Joint mobilizations and Soft tissue Mobilization were applied to the: R knee for 10 minutes, including:  Grade II Patellar mobilizations in all directions  STM to right knee joint, hamstring insertions, and heads of gastrocs        Home Exercises and Patient Education Provided    Education provided:   - Importance and role of physical therapy  - Proper body mechanics when performing HEP    Written Home Exercises Provided: yes.  Exercises were reviewed and Sy was able to demonstrate them prior to the end of the session.  Sy demonstrated good  understanding of the education provided.     See EMR under Patient Instructions for exercises provided 12/10/2019.    Assessment     Sy is a 64 y.o. male referred to outpatient Physical Therapy with a medical diagnosis of primary osteoarthritis of right knee. Pt presents to PT with pain, decreased right knee ROM, decreased strength, poor posture, impaired balance and gait, and functional deficits with walking. Pt presented with ~1 inch drainage present in the center of bandage and a smaller ~1cm circular draining spot at superior portion of bandage. Pt would benefit from skilled PT consisting of manual therapy including STM/MFR right  knee, patellar mobs, knee flex/ext mobs/stretching; therapeutic exercise including LE strengthening/stretching, " postural education, balance and gait training, and modalities prn to address limitations and increase functional mobility.      Pt prognosis is Excellent.   Pt will benefit from skilled outpatient Physical Therapy to address the deficits stated above and in the chart below, provide pt/family education, and to maximize pt's level of independence.     Plan of care discussed with patient: Yes  Pt's spiritual, cultural and educational needs considered and patient is agreeable to the plan of care and goals as stated below:     Anticipated Barriers for therapy: None    Medical Necessity is demonstrated by the following  History  Co-morbidities and personal factors that may impact the plan of care Co-morbidities:   None    Personal Factors:   no deficits     low   Examination  Body Structures and Functions, activity limitations and participation restrictions that may impact the plan of care Body Regions:   lower extremities    Body Systems:    ROM  strength  gross coordinated movement  balance  gait    Participation Restrictions:   None    Activity limitations:   Learning and applying knowledge  no deficits    General Tasks and Commands  no deficits    Communication  no deficits    Mobility  lifting and carrying objects  walking  moving around using equipment (WC)    Self care  dressing    Domestic Life  doing house work (cleaning house, washing dishes, laundry)    Interactions/Relationships  no deficits    Life Areas  no deficits    Community and Social Life  community life  recreation and leisure         low   Clinical Presentation evolving clinical presentation with changing clinical characteristics low   Decision Making/ Complexity Score: low     Goals:  Short Term Goals:    1. Pt will be independent with HEP supplement PT in improving functional mobility.  2. Pt will improve RLE strength to at least 75% of LLE strength as measured via MicroFet handheld dynamometer in order to improve functional mobility  3. Pt will  "improve R knee AROM to at least 90 degrees of flexion and -7 degrees of extension in order to improve gait    Long Term Goals:  1. Pt will be independent with updated HEP supplement PT in improving functional mobility.  2. Pt will improve R LE strength to at least 90% of L LE strength as measured via MicroFet handheld dynamometer in order to improve functional mobility  3. Pt will improve R knee AROM to at least 110 degrees of flexion and 0 degrees of extension in order to improve gait and ability to perform ADLs  4. Pt will improve FOTO knee survey score to </= 40% limited in order to demo improved functional mobility  5. Pt will improve score on Function,Daily Living section of KOOS to at least 21/68, (30.8% limited) in order to demo improved functional mobility  6. Pt will perform TUG in < 10 seconds without AD in order to demo improved gait speed  7. Pt will perform at least 16 sit to stands without UE support on 30 second sit to stand test in order to demo improved ability to perform transfers        TUG Cutoff Scores:        30" sit to stand Cutoff Scores:          Plan     Plan of care Certification: 12/10/2019 to 1/24/2020.    Outpatient Physical Therapy 4 times weekly for 6 weeks to include the following interventions: Gait Training, Manual Therapy, Moist Heat/ Ice, Neuromuscular Re-ed, Orthotic Management and Training, Patient Education, Therapeutic Activites and Therapeutic Exercise, Aris COLBY, PT, DPT    " Yes

## 2019-12-10 NOTE — ANESTHESIA POSTPROCEDURE EVALUATION
Anesthesia Post Evaluation    Patient: Sy Aguiar    Procedure(s) Performed: Procedure(s) (LRB):  ARTHROPLASTY, KNEE, SIGHT ASSISTED (Right)    Final Anesthesia Type: general    Patient location during evaluation: PACU  Patient participation: Yes- Able to Participate  Level of consciousness: awake and alert, oriented and awake  Post-procedure vital signs: reviewed and stable  Pain management: adequate  Airway patency: patent    PONV status at discharge: No PONV  Anesthetic complications: no      Cardiovascular status: blood pressure returned to baseline  Respiratory status: unassisted and room air  Hydration status: euvolemic  Follow-up not needed.          Vitals Value Taken Time   /72 12/9/2019  3:30 PM   Temp 36.6 °C (97.8 °F) 12/9/2019  1:50 PM   Pulse 60 12/9/2019  3:30 PM   Resp 16 12/9/2019  3:30 PM   SpO2 98 % 12/9/2019  3:30 PM         Event Time     Out of Recovery 13:39:39          Pain/Melia Score: Pain Rating Prior to Med Admin: 4 (12/9/2019  6:41 AM)  Melia Score: 10 (12/9/2019  3:57 PM)

## 2019-12-10 NOTE — PLAN OF CARE
"[facility]  Physical Therapy Initial Evaluation    Name: Sy Aguiar  Clinic Number: [id number]    Therapy Diagnosis: No diagnosis found.  Physician: Urbano Vigil MD    Physician Orders: {AMB PT KNEE ORDERS:16818} ***  Medical Diagnosis from Referral: ***  Evaluation Date: 12/10/2019  Authorization Period Expiration: ***  Plan of Care Expiration: ***  Visit # / Visits authorized: ***/ ***  FOTO: 1/10    Gcode: 1/10  Visit:  ***  Total: ***    Time In: ***  Time Out: ***  Total Billable Time: *** minutes (*** Complexity Evaluation, Therapeutic Exercise - ***, Manual Therapy - ***)    Precautions: Standard, {IP WOUND PRECAUTIONS OHS:63749}    Subjective     Date of onset: DOS: ***    History of current condition - Sy reports: {HE, SHE:83212} had a {Right/left:18002} TKA on ***       Past Medical History:   Diagnosis Date    Asthma     Hypertension      Sy Aguiar  has a past surgical history that includes Back surgery; Elbow surgery (Right); and Knee surgery.    Sy has a current medication list which includes the following prescription(s): acetaminophen, acetaminophen, albuterol, aspirin, cephalexin, diclofenac, docusate sodium, famotidine, guaifenesin, hydroxyzine hcl, losartan, melatonin, oxycodone-acetaminophen, and sildenafil, and the following Facility-Administered Medications: lactated ringers and lidocaine (pf) 10 mg/ml (1%).    Review of patient's allergies indicates:  No Known Allergies     Imaging, {Mri/ctscan/bone scan:12220}: ***    Prior Therapy: ***  Social History: *** {LIVES WITH:10169}  Occupation: ***  Prior Level of Function: ***  Current Level of Function: ***    Pain:  Current {0-10:20507::"0"}/10, worst {0-10:20507::"0"}/10, best {0-10:20507::"0"}/10   Location: {RIGHT LEFT BILATERAL:80188} {LOCATION ON BODY:78641}  Description: {Pain Description:09465}  Aggravating Factors: {Causes; Pain:17080}  Easing Factors: {Pain (activities that " relieve):29924}    Pts goals: ***    Objective     ***  Posture: ***  Palpation: ***    AROM: *denotes pain  {Right/left:30217} knee: ***-0-***    PROM: *denotes pain  {Right/left:45895} knee: ***-0-***    L/E Strength w/ MicroFET Muscle Luz Dynamometer Right Left Pain/Dysfunction with Movement   (approx 4 sec hold w/ max contraction)   Hip Flexion *** kg  *** kg     Hip Abduction *** kg  *** kg     Quadriceps *** kg  *** kg     Hamstrings *** kg  *** kg       TUG:  *** seconds (w/ RW)    30 second sit-to-stand test (without U/E support): 0 (*** w/ UE support)    KOOS Knee Survey:    Symptoms: ***/28  Pain:  ***/36  Functional, Daily Living:  ***/68 (***% disabled)  Function, Sports and Recreational Activities:  ***/20  Quality of Life:  ***/16    PROMIS-29:    Physical Function:  ***/20  Anxiety:  ***/20  Depression:  ***/20   Fatigue:  ***/20   Sleep Disturbance:  ***/20   Satisfaction with Social Role:  ***/20   Pain Interference:  ***/20   Pain Intensity:  ***/10    Gait Analysis: with RW: ***    CMS Impairment/Limitation/Restriction for KOOS, Functional, Daily Living    Therapist reviewed KOOS scores for Sy Aguiar on 12/10/2019.   KOOS documents entered into [employer] - see Media section.    Limitation Score: ***%  Category: Mobility    Current : {G Codes:02053}  Goal: {G Codes:22612}        Limitation for FOTO Knee Survey  Limitation Score: ***%         TREATMENT     Treatment Time In: ***  Treatment Time Out: ***  Total Treatment time separate from Evaluation: *** minutes    Sy received therapeutic exercises to develop {AMB PT PROGRESS OBJECTIVE:15994} for *** minutes including:  ***    Sy received the following manual therapy techniques: {AMB PT PROGRESS MANUAL THERAPY:02774} were applied to the: *** for *** minutes, including:  ***    Sy participated in gait training to improve functional mobility and safety for ***  minutes, including:  ***    Sy received cold pack for *** minutes to  "***.      Home Exercises and Patient Education Provided    Education provided:   - ***    Written Home Exercises Provided: {Blank single:60030::"yes","Patient instructed to cont prior HEP"}.  Exercises were reviewed and Sy was able to demonstrate them prior to the end of the session.  Sy demonstrated {Desc; good/fair/poor:19137} understanding of the education provided.     See EMR under {Blank single:22067::"Media","Patient Instructions"} for exercises provided {Blank single:92978::"12/10/2019","prior visit"}.    Assessment     Sy is a 64 y.o. male referred to outpatient Physical Therapy with a medical diagnosis of ***. Pt presents to PT with pain, decreased {RIGHT /LEFT:07508} knee ROM, decreased strength, poor posture, impaired balance and gait, and functional deficits with walking. Pt would benefit from skilled PT consisting of manual therapy including STM/MFR {RIGHT /LEFT:08424}  knee, patellar mobs, knee flex/ext mobs/stretching; therapeutic exercise including LE strengthening/stretching, postural education, balance and gait training, and modalities prn to address limitations and increase functional mobility.      Pt prognosis is {REHAB PROGNOSIS OHS:03284}.   Pt will benefit from skilled outpatient Physical Therapy to address the deficits stated above and in the chart below, provide pt/family education, and to maximize pt's level of independence.     Plan of care discussed with patient: {YES:50564}  Pt's spiritual, cultural and educational needs considered and patient is agreeable to the plan of care and goals as stated below:     Anticipated Barriers for therapy: ***    Medical Necessity is demonstrated by the following  History  Co-morbidities and personal factors that may impact the plan of care Co-morbidities:   {Co-morbidities:89335}    Personal Factors:   {Personal Factors:08218}     {Desc; low/moderate/high:995996}   Examination  Body Structures and Functions, activity limitations and " participation restrictions that may impact the plan of care Body Regions:   {Body Regions:05898}    Body Systems:    {Body Systems:17655}    Participation Restrictions:   ***    Activity limitations:   Learning and applying knowledge  {Learning and applying knowledge:31884}    General Tasks and Commands  {Gen tasks and commands:69422}    Communication  {Communication:85233}    Mobility  {Mobility:11698}    Self care  {Self Care:44439}    Domestic Life  {Domestic Life:16376}    Interactions/Relationships  {Interactions/Relationships:48277}    Life Areas  {Life Areas:21486}    Community and Social Life  {Community/Social Life:88373}         {Desc; low/moderate/high:711880}   Clinical Presentation {Clinical Presentation :00022} {Desc; low/moderate/high:994153}   Decision Making/ Complexity Score: {Desc; low/moderate/high:155169}     Goals:  Short Term Goals:    1. Pt will be independent with HEP supplement PT in improving functional mobility.  2. Pt will improve ***LE strength to at least 75% of *** LE strength as measured via MicroFet handheld dynamometer in order to improve functional mobility  3. Pt will improve *** knee AROM to at least *** in order to improve gait    Long Term Goals:  1. Pt will be independent with updated HEP supplement PT in improving functional mobility.  2. Pt will improve *** LE strength to at least 90% of *** LE strength as measured via MicroFet handheld dynamometer in order to improve functional mobility  3. Pt will improve *** knee AROM to at least *** in order to improve gait and ability to perform ADLs  4. Pt will improve FOTO knee survey score to </= ***% limited in order to demo improved functional mobility  5. Pt will improve score on Function,Daily Living section of KOOS to at least ***/68   ( ***% limited) in order to demo improved functional mobility  6. Pt will perform TUG in < 10 seconds without AD in order to demo improved gait speed  7. Pt will perform at least *** sit to  "stands without UE support on 30 second sit to stand test in order to demo improved ability to perform transfers        TUG Cutoff Scores:***        30" sit to stand Cutoff Scores:***          Plan     Plan of care Certification: 12/10/2019 to ***.    Outpatient Physical Therapy 4 times weekly for 6 weeks to include the following interventions: Gait Training, Manual Therapy, Moist Heat/ Ice, Neuromuscular Re-ed, Orthotic Management and Training, Patient Education, Therapeutic Activites and Therapeutic Exercise, ASTYM, Kinesiotape    Ken Neely, PT, DPT  "

## 2019-12-11 ENCOUNTER — CLINICAL SUPPORT (OUTPATIENT)
Dept: REHABILITATION | Facility: HOSPITAL | Age: 64
End: 2019-12-11
Attending: ORTHOPAEDIC SURGERY
Payer: MEDICARE

## 2019-12-11 DIAGNOSIS — M25.561 ACUTE PAIN OF RIGHT KNEE: ICD-10-CM

## 2019-12-11 DIAGNOSIS — Z74.09 IMPAIRED FUNCTIONAL MOBILITY, BALANCE, GAIT, AND ENDURANCE: ICD-10-CM

## 2019-12-11 DIAGNOSIS — M25.661 DECREASED RANGE OF MOTION OF RIGHT KNEE: ICD-10-CM

## 2019-12-11 PROCEDURE — 97140 MANUAL THERAPY 1/> REGIONS: CPT | Mod: PN | Performed by: PHYSICAL THERAPIST

## 2019-12-11 PROCEDURE — 97110 THERAPEUTIC EXERCISES: CPT | Mod: PN | Performed by: PHYSICAL THERAPIST

## 2019-12-11 NOTE — PROGRESS NOTES
"  Physical Therapy Daily Treatment Note     Name: Sy Aguiar  Clinic Number: [id number]    Therapy Diagnosis:   Encounter Diagnoses   Name Primary?    Acute pain of right knee     Impaired functional mobility, balance, gait, and endurance     Decreased range of motion of right knee      Physician: Urbano Vigil MD    Visit Date: 12/11/2019    Physician Orders: PT Eval and Treat  Medical Diagnosis from Referral: M17.12 (ICD-10-CM) - Primary osteoarthritis of left knee  Evaluation Date: 12/10/2019  Authorization Period Expiration: 1/26/2020  Plan of Care Expiration: 1/24/2020  Visit # / Visits authorized: 2/12  FOTO: 2/10     Gcode: 2/10  Visit:  57.78  Total: 198.34     Time In: 1:00 PM  Time Out: 2:00 PM  Total Billable Time: 30 minutes     Precautions: Standard, Right TKA on 12/9/2019    Subjective     Pt reports: feeling good, had some trouble at night time but no further pain.  He was compliant with home exercise program.  Response to previous treatment: no adverse effects  Functional change: none yet    Pain: 3/10  Location: right knee      Objective     Sy received the following manual therapy techniques: Joint mobilizations were applied to the: right knee for 10 minutes, including:  Patellar mobilizations in all directions  Extension mobilizations    Sy received therapeutic exercises to develop strength, ROM and flexibility for 35 minutes including:  Quad Sets 5" hold, 25x  Supine TKE with small bolster 3" hold, 20x  SLR 4x6  sidelying SLR 25x  R knee extension stretch 4'  Heel raises 30x    Sy received cold pack to right knee for 10 minutes at end of session        Home Exercises Provided and Patient Education Provided     Education provided:   - Continue HEP  - Home modalities prn    Written Home Exercises Provided: yes.  Exercises were reviewed and Sy was able to demonstrate them prior to the end of the session.  Sy demonstrated good  understanding of the education provided.     See " EMR under Patient Instructions for exercises provided on evaluation.      Assessment     Pt responds well to treatment plan, no increase in pain along right knee with therex. Pt reports taking medicine only at night to help with sleeping.   Sy is progressing well towards his goals.   Pt prognosis is Excellent.     Pt will continue to benefit from skilled outpatient physical therapy to address the deficits listed in the problem list box on initial evaluation, provide pt/family education and to maximize pt's level of independence in the home and community environment.     Pt's spiritual, cultural and educational needs considered and pt agreeable to plan of care and goals.    Anticipated barriers to physical therapy: High BMI    Goals:   Short Term Goals:     1. Pt will be independent with HEP supplement PT in improving functional mobility. (Progressing, not met)  2. Pt will improve RLE strength to at least 75% of LLE strength as measured via MicroFet handheld dynamometer in order to improve functional mobility (Progresing, not met)  3. Pt will improve R knee AROM to at least 90 degrees of flexion and -7 degrees of extension in order to improve gait (Progressing, not met)     Long Term Goals:  1. Pt will be independent with updated HEP supplement PT in improving functional mobility. (Progressing, not met)  2. Pt will improve R LE strength to at least 90% of L LE strength as measured via MicroFet handheld dynamometer in order to improve functional mobility (Progressing, not met)  3. Pt will improve R knee AROM to at least 110 degrees of flexion and 0 degrees of extension in order to improve gait and ability to perform ADLs (Progressing, not met)  4. Pt will improve FOTO knee survey score to </= 40% limited in order to demo improved functional mobility (Progressing, not met)  5. Pt will improve score on Function,Daily Living section of KOOS to at least 21/68, (30.8% limited) in order to demo improved functional  mobility (Progressing, not met)  6. Pt will perform TUG in < 10 seconds without AD in order to demo improved gait speed (Progressing, not met)  7. Pt will perform at least 16 sit to stands without UE support on 30 second sit to stand test in order to demo improved ability to perform transfers (Progressing, not met)    Plan     Continue POC to advance toward functional goals.     Raheem Bajwa, PT

## 2019-12-13 ENCOUNTER — CLINICAL SUPPORT (OUTPATIENT)
Dept: REHABILITATION | Facility: HOSPITAL | Age: 64
End: 2019-12-13
Attending: ORTHOPAEDIC SURGERY
Payer: MEDICARE

## 2019-12-13 DIAGNOSIS — M25.561 ACUTE PAIN OF RIGHT KNEE: ICD-10-CM

## 2019-12-13 DIAGNOSIS — M25.661 DECREASED RANGE OF MOTION OF RIGHT KNEE: ICD-10-CM

## 2019-12-13 DIAGNOSIS — Z74.09 IMPAIRED FUNCTIONAL MOBILITY, BALANCE, GAIT, AND ENDURANCE: ICD-10-CM

## 2019-12-13 PROCEDURE — 97110 THERAPEUTIC EXERCISES: CPT | Mod: PN | Performed by: PHYSICAL THERAPIST

## 2019-12-13 PROCEDURE — 97140 MANUAL THERAPY 1/> REGIONS: CPT | Mod: PN | Performed by: PHYSICAL THERAPIST

## 2019-12-16 ENCOUNTER — CLINICAL SUPPORT (OUTPATIENT)
Dept: REHABILITATION | Facility: HOSPITAL | Age: 64
End: 2019-12-16
Attending: ORTHOPAEDIC SURGERY
Payer: MEDICARE

## 2019-12-16 DIAGNOSIS — M25.661 DECREASED RANGE OF MOTION OF RIGHT KNEE: ICD-10-CM

## 2019-12-16 DIAGNOSIS — M25.561 ACUTE PAIN OF RIGHT KNEE: ICD-10-CM

## 2019-12-16 DIAGNOSIS — Z74.09 IMPAIRED FUNCTIONAL MOBILITY, BALANCE, GAIT, AND ENDURANCE: ICD-10-CM

## 2019-12-16 PROCEDURE — 97140 MANUAL THERAPY 1/> REGIONS: CPT | Mod: PN | Performed by: PHYSICAL THERAPIST

## 2019-12-16 PROCEDURE — 97110 THERAPEUTIC EXERCISES: CPT | Mod: PN | Performed by: PHYSICAL THERAPIST

## 2019-12-16 NOTE — PROGRESS NOTES
"  Physical Therapy Daily Treatment Note     Name: Sy Aguiar  Clinic Number: [id number]    Therapy Diagnosis:   Encounter Diagnoses   Name Primary?    Acute pain of right knee     Impaired functional mobility, balance, gait, and endurance     Decreased range of motion of right knee      Physician: Urbano Vigil MD    Visit Date: 12/16/2019    Physician Orders: PT Eval and Treat  Medical Diagnosis from Referral: M17.12 (ICD-10-CM) - Primary osteoarthritis of left knee  Evaluation Date: 12/10/2019  Authorization Period Expiration: 1/26/2020  Plan of Care Expiration: 1/24/2020  Visit # / Visits authorized: 4/12  FOTO: 4/10     Gcode: 4/10  Visit:  118.42  Total: 2374.44     Time In: 11:00 AM  Time Out: 12:10 PM  Total Billable Time: 60 minutes     Precautions: Standard, Right TKA on 12/9/2019    Subjective     Pt reports: stood up quickly on Saturday and felt lateral knee pain. Pt reports pain did subside, appetite decreased due to pain medicine. .   He was compliant with home exercise program.  Response to previous treatment: no adverse effects  Functional change: none yet    Pain: 3/10  Location: right knee      Objective     Sy received the following manual therapy techniques: Joint mobilizations were applied to the: right knee for 15 minutes, including:  Patellar mobilizations in all directions  Extension mobilizations  Bandage removal    Sy received therapeutic exercises to develop strength, ROM and flexibility for 45 minutes including:  Quad Sets 5" hold, 25x  Supine TKE with big bolster 3" hold, 25x  Heel slides 3" hold, 30x  SLR 4x8  sidelying SLR 3x10  R knee extension stretch 4'  Standing TKE Purple SC 5" hold, 20x  nustep 5'  Fitter 30" x 3  Bike at end for ROM 5'    TUG:  15 seconds (w/ RW)     30 second sit-to-stand test (without U/E support): 0 (11 w/ UE support)    AROM: 3-0-105 on RLE          Sy received cold pack to right knee for 10 minutes at end of session        Home Exercises " Provided and Patient Education Provided     Education provided:   - Continue HEP  - Home modalities prn    Written Home Exercises Provided: yes.  Exercises were reviewed and Sy was able to demonstrate them prior to the end of the session.  Sy demonstrated good  understanding of the education provided.     See EMR under Patient Instructions for exercises provided on evaluation.      Assessment     Pt presents with good knee flexion during heel slides and bike. Pt tolerated standing therex with no difficulty. Pt educated on weaning to SC at home.    Sy is progressing well towards his goals.   Pt prognosis is Excellent.     Pt will continue to benefit from skilled outpatient physical therapy to address the deficits listed in the problem list box on initial evaluation, provide pt/family education and to maximize pt's level of independence in the home and community environment.     Pt's spiritual, cultural and educational needs considered and pt agreeable to plan of care and goals.    Anticipated barriers to physical therapy: High BMI    Goals:   Short Term Goals:     1. Pt will be independent with HEP supplement PT in improving functional mobility. (Progressing, not met)  2. Pt will improve RLE strength to at least 75% of LLE strength as measured via MicroFet handheld dynamometer in order to improve functional mobility (Progresing, not met)  3. Pt will improve R knee AROM to at least 90 degrees of flexion and -7 degrees of extension in order to improve gait (Progressing, not met)     Long Term Goals:  1. Pt will be independent with updated HEP supplement PT in improving functional mobility. (Progressing, not met)  2. Pt will improve R LE strength to at least 90% of L LE strength as measured via MicroFet handheld dynamometer in order to improve functional mobility (Progressing, not met)  3. Pt will improve R knee AROM to at least 110 degrees of flexion and 0 degrees of extension in order to improve gait and  ability to perform ADLs (Progressing, not met)  4. Pt will improve FOTO knee survey score to </= 40% limited in order to demo improved functional mobility (Progressing, not met)  5. Pt will improve score on Function,Daily Living section of KOOS to at least 21/68, (30.8% limited) in order to demo improved functional mobility (Progressing, not met)  6. Pt will perform TUG in < 10 seconds without AD in order to demo improved gait speed (Progressing, not met)  7. Pt will perform at least 16 sit to stands without UE support on 30 second sit to stand test in order to demo improved ability to perform transfers (Progressing, not met)    Plan     Continue POC to advance toward functional goals.     Raheem Bajwa, PT

## 2019-12-16 NOTE — PROGRESS NOTES
"  Physical Therapy Daily Treatment Note     Name: Sy Aguiar  Clinic Number: [id number]    Therapy Diagnosis:   Encounter Diagnoses   Name Primary?    Acute pain of right knee     Impaired functional mobility, balance, gait, and endurance     Decreased range of motion of right knee      Physician: Urbano Vigil MD    Visit Date: 12/13/2019    Physician Orders: PT Eval and Treat  Medical Diagnosis from Referral: M17.12 (ICD-10-CM) - Primary osteoarthritis of left knee  Evaluation Date: 12/10/2019  Authorization Period Expiration: 1/26/2020  Plan of Care Expiration: 1/24/2020  Visit # / Visits authorized: 3/12  FOTO: 3/10     Gcode: 3/10  Visit:  57.78  Total: 256.02     Time In: 11:00 AM  Time Out: 12:10 PM  Total Billable Time: 30 minutes     Precautions: Standard, Right TKA on 12/9/2019    Subjective     Pt reports: feeling good, taking pain medicine at night. Heavy bruising noted.   He was compliant with home exercise program.  Response to previous treatment: no adverse effects  Functional change: none yet    Pain: 3/10  Location: right knee      Objective     Sy received the following manual therapy techniques: Joint mobilizations were applied to the: right knee for 15 minutes, including:  Patellar mobilizations in all directions  Extension mobilizations  Bandage removal    Sy received therapeutic exercises to develop strength, ROM and flexibility for 45 minutes including:  Quad Sets 5" hold, 25x  Supine TKE with small bolster 3" hold, 25x  SLR 4x8  sidelying SLR 25x  R knee extension stretch 4'  Heel raises 30x  nustep 5'  Fitter 30" x 3    Sy received cold pack to right knee for 10 minutes at end of session        Home Exercises Provided and Patient Education Provided     Education provided:   - Continue HEP  - Home modalities prn    Written Home Exercises Provided: yes.  Exercises were reviewed and Sy was able to demonstrate them prior to the end of the session.  Sy demonstrated good  " understanding of the education provided.     See EMR under Patient Instructions for exercises provided on evaluation.      Assessment     Pt presents with clean incision with bandage removal, minimal drainage. Pt able to bend knee well but will perform flexion therex following week. Pt SLR with good quad control and no pain. .   Sy is progressing well towards his goals.   Pt prognosis is Excellent.     Pt will continue to benefit from skilled outpatient physical therapy to address the deficits listed in the problem list box on initial evaluation, provide pt/family education and to maximize pt's level of independence in the home and community environment.     Pt's spiritual, cultural and educational needs considered and pt agreeable to plan of care and goals.    Anticipated barriers to physical therapy: High BMI    Goals:   Short Term Goals:     1. Pt will be independent with HEP supplement PT in improving functional mobility. (Progressing, not met)  2. Pt will improve RLE strength to at least 75% of LLE strength as measured via MicroFet handheld dynamometer in order to improve functional mobility (Progresing, not met)  3. Pt will improve R knee AROM to at least 90 degrees of flexion and -7 degrees of extension in order to improve gait (Progressing, not met)     Long Term Goals:  1. Pt will be independent with updated HEP supplement PT in improving functional mobility. (Progressing, not met)  2. Pt will improve R LE strength to at least 90% of L LE strength as measured via MicroFet handheld dynamometer in order to improve functional mobility (Progressing, not met)  3. Pt will improve R knee AROM to at least 110 degrees of flexion and 0 degrees of extension in order to improve gait and ability to perform ADLs (Progressing, not met)  4. Pt will improve FOTO knee survey score to </= 40% limited in order to demo improved functional mobility (Progressing, not met)  5. Pt will improve score on Function,Daily Living  section of KOOS to at least 21/68, (30.8% limited) in order to demo improved functional mobility (Progressing, not met)  6. Pt will perform TUG in < 10 seconds without AD in order to demo improved gait speed (Progressing, not met)  7. Pt will perform at least 16 sit to stands without UE support on 30 second sit to stand test in order to demo improved ability to perform transfers (Progressing, not met)    Plan     Continue POC to advance toward functional goals.     Raheem Bajwa, PT

## 2019-12-16 NOTE — PROGRESS NOTES
"  Physical Therapy Daily Treatment Note     Name: Sy Aguiar  Clinic Number: [id number]    Therapy Diagnosis:   Encounter Diagnoses   Name Primary?    Acute pain of right knee     Impaired functional mobility, balance, gait, and endurance     Decreased range of motion of right knee      Physician: Urbano Vigil MD    Visit Date: 12/16/2019    Physician Orders: PT Eval and Treat  Medical Diagnosis from Referral: M17.12 (ICD-10-CM) - Primary osteoarthritis of left knee  Evaluation Date: 12/10/2019  Authorization Period Expiration: 1/26/2020  Plan of Care Expiration: 1/24/2020  Visit # / Visits authorized: 2/12  FOTO: 2/10     Gcode: 2/10  Visit:  57.78  Total: 198.34     Time In: 1:00 PM  Time Out: 2:00 PM  Total Billable Time: 30 minutes     Precautions: Standard, Right TKA on 12/9/2019    Subjective     Pt reports: feeling good, had some trouble at night time but no further pain.  He was compliant with home exercise program.  Response to previous treatment: no adverse effects  Functional change: none yet    Pain: 3/10  Location: right knee      Objective     Sy received the following manual therapy techniques: Joint mobilizations were applied to the: right knee for 10 minutes, including:  Patellar mobilizations in all directions  Extension mobilizations    Sy received therapeutic exercises to develop strength, ROM and flexibility for 35 minutes including:  Quad Sets 5" hold, 25x  Supine TKE with small bolster 3" hold, 20x  SLR 4x6  sidelying SLR 25x  R knee extension stretch 4'  Heel raises 30x    Sy received cold pack to right knee for 10 minutes at end of session        Home Exercises Provided and Patient Education Provided     Education provided:   - Continue HEP  - Home modalities prn    Written Home Exercises Provided: yes.  Exercises were reviewed and Sy was able to demonstrate them prior to the end of the session.  Sy demonstrated good  understanding of the education provided.     See " EMR under Patient Instructions for exercises provided on evaluation.      Assessment     Pt responds well to treatment plan, no increase in pain along right knee with therex. Pt reports taking medicine only at night to help with sleeping.   Sy is progressing well towards his goals.   Pt prognosis is Excellent.     Pt will continue to benefit from skilled outpatient physical therapy to address the deficits listed in the problem list box on initial evaluation, provide pt/family education and to maximize pt's level of independence in the home and community environment.     Pt's spiritual, cultural and educational needs considered and pt agreeable to plan of care and goals.    Anticipated barriers to physical therapy: High BMI    Goals:   Short Term Goals:     1. Pt will be independent with HEP supplement PT in improving functional mobility. (Progressing, not met)  2. Pt will improve RLE strength to at least 75% of LLE strength as measured via MicroFet handheld dynamometer in order to improve functional mobility (Progresing, not met)  3. Pt will improve R knee AROM to at least 90 degrees of flexion and -7 degrees of extension in order to improve gait (Progressing, not met)     Long Term Goals:  1. Pt will be independent with updated HEP supplement PT in improving functional mobility. (Progressing, not met)  2. Pt will improve R LE strength to at least 90% of L LE strength as measured via MicroFet handheld dynamometer in order to improve functional mobility (Progressing, not met)  3. Pt will improve R knee AROM to at least 110 degrees of flexion and 0 degrees of extension in order to improve gait and ability to perform ADLs (Progressing, not met)  4. Pt will improve FOTO knee survey score to </= 40% limited in order to demo improved functional mobility (Progressing, not met)  5. Pt will improve score on Function,Daily Living section of KOOS to at least 21/68, (30.8% limited) in order to demo improved functional  mobility (Progressing, not met)  6. Pt will perform TUG in < 10 seconds without AD in order to demo improved gait speed (Progressing, not met)  7. Pt will perform at least 16 sit to stands without UE support on 30 second sit to stand test in order to demo improved ability to perform transfers (Progressing, not met)    Plan     Continue POC to advance toward functional goals.     Raheem Bajwa, PT

## 2019-12-17 ENCOUNTER — CLINICAL SUPPORT (OUTPATIENT)
Dept: REHABILITATION | Facility: HOSPITAL | Age: 64
End: 2019-12-17
Attending: ORTHOPAEDIC SURGERY
Payer: MEDICARE

## 2019-12-17 DIAGNOSIS — M25.661 DECREASED RANGE OF MOTION OF RIGHT KNEE: ICD-10-CM

## 2019-12-17 DIAGNOSIS — M25.561 ACUTE PAIN OF RIGHT KNEE: ICD-10-CM

## 2019-12-17 DIAGNOSIS — Z74.09 IMPAIRED FUNCTIONAL MOBILITY, BALANCE, GAIT, AND ENDURANCE: ICD-10-CM

## 2019-12-17 PROCEDURE — 97140 MANUAL THERAPY 1/> REGIONS: CPT | Mod: PN

## 2019-12-17 PROCEDURE — 97110 THERAPEUTIC EXERCISES: CPT | Mod: PN

## 2019-12-17 NOTE — PROGRESS NOTES
"  Physical Therapy Daily Treatment Note     Name: Sy Aguiar  Clinic Number: [id number]    Therapy Diagnosis:   Encounter Diagnoses   Name Primary?    Acute pain of right knee     Impaired functional mobility, balance, gait, and endurance     Decreased range of motion of right knee      Physician: Urbano Vigil MD    Visit Date: 12/17/2019    Physician Orders: PT Eval and Treat  Medical Diagnosis from Referral: M17.12 (ICD-10-CM) - Primary osteoarthritis of left knee  Evaluation Date: 12/10/2019  Authorization Period Expiration: 1/26/2020  Plan of Care Expiration: 1/24/2020  Visit # / Visits authorized: 5/12  FOTO: 5/10 NEXT   PTA :1/5  Gcode: 5/10  Visit:  88.10  Total: 344.18     Time In: 1:25  Time Out: 2:20 PM  Total Billable Time: 40 minutes (2TE, 1 MT)     Precautions: Standard, Right TKA on 12/9/2019    Subjective     Pt reports:agreeable to PT session, "feels less tight without the bandage on right knee)   He was compliant with home exercise program.  Response to previous treatment: no adverse effects  Functional change: none yet    Pain: 3/10  Location: right knee      Objective     Sy received the following manual therapy techniques: Joint mobilizations were applied to the: right knee for 15 minutes, including:  Patellar mobilizations in all directions  Extension mobilizations  IASTM to R knee: IT band, medial/ lateral joint line, distal quadriceps mm heads    Sy received therapeutic exercises to develop strength, ROM and flexibility for 25 minutes including:  Quad Sets 5" hold, 25x  Supine TKE with big bolster 3" hold, 25x NOT PERFORMED TODAY   Heel slides 3" hold, 30x  SLR 4x8  sidelying SLR 3x10  R knee extension stretch 4'  Standing TKE Purple SC 5" hold, 20x NOT PERFORMED TODAY  nustep 5' NOT PERFORMED TODAY   Fitter 30" x 3  Bike at end for ROM 5'    Sy received cold pack to right knee for 10 minutes at end of session    Home Exercises Provided and Patient Education Provided "     Education provided:   - Continue HEP  - Home modalities prn    Written Home Exercises Provided: yes.  Exercises were reviewed and Sy was able to demonstrate them prior to the end of the session.  Sy demonstrated good  understanding of the education provided.     See EMR under Patient Instructions for exercises provided on evaluation.      Assessment     Pt able to complete all therex with no reports of increased R knee pain. He demonstrated no difficulty performing instructions provided. Wound observation: dry sanguinous residue noted, no active bleed or drainage noted. Steri strips intact. Bruising visible w/ expected edema to general knee and distal tibial aspect of R LE. No redness or odor noted.   Sy is progressing well towards his goals.   Pt prognosis is Excellent.     Pt will continue to benefit from skilled outpatient physical therapy to address the deficits listed in the problem list box on initial evaluation, provide pt/family education and to maximize pt's level of independence in the home and community environment.     Pt's spiritual, cultural and educational needs considered and pt agreeable to plan of care and goals.    Anticipated barriers to physical therapy: High BMI    Goals:   Short Term Goals:     1. Pt will be independent with HEP supplement PT in improving functional mobility. (Progressing, not met)  2. Pt will improve RLE strength to at least 75% of LLE strength as measured via MicroFet handheld dynamometer in order to improve functional mobility (Progresing, not met)  3. Pt will improve R knee AROM to at least 90 degrees of flexion and -7 degrees of extension in order to improve gait (Progressing, not met)     Long Term Goals:  1. Pt will be independent with updated HEP supplement PT in improving functional mobility. (Progressing, not met)  2. Pt will improve R LE strength to at least 90% of L LE strength as measured via MicroFet handheld dynamometer in order to improve  functional mobility (Progressing, not met)  3. Pt will improve R knee AROM to at least 110 degrees of flexion and 0 degrees of extension in order to improve gait and ability to perform ADLs (Progressing, not met)  4. Pt will improve FOTO knee survey score to </= 40% limited in order to demo improved functional mobility (Progressing, not met)  5. Pt will improve score on Function,Daily Living section of KOOS to at least 21/68, (30.8% limited) in order to demo improved functional mobility (Progressing, not met)  6. Pt will perform TUG in < 10 seconds without AD in order to demo improved gait speed (Progressing, not met)  7. Pt will perform at least 16 sit to stands without UE support on 30 second sit to stand test in order to demo improved ability to perform transfers (Progressing, not met)    Plan     Continue POC to advance toward functional goals.     Jeffrey Moe, PTA

## 2019-12-19 ENCOUNTER — CLINICAL SUPPORT (OUTPATIENT)
Dept: REHABILITATION | Facility: HOSPITAL | Age: 64
End: 2019-12-19
Attending: ORTHOPAEDIC SURGERY
Payer: MEDICARE

## 2019-12-19 DIAGNOSIS — M25.661 DECREASED RANGE OF MOTION OF RIGHT KNEE: ICD-10-CM

## 2019-12-19 DIAGNOSIS — M25.561 ACUTE PAIN OF RIGHT KNEE: ICD-10-CM

## 2019-12-19 DIAGNOSIS — Z74.09 IMPAIRED FUNCTIONAL MOBILITY, BALANCE, GAIT, AND ENDURANCE: ICD-10-CM

## 2019-12-19 PROCEDURE — 97110 THERAPEUTIC EXERCISES: CPT | Mod: PN

## 2019-12-19 NOTE — PROGRESS NOTES
"  Physical Therapy Daily Treatment Note     Name: Sy Aguiar  Clinic Number: [id number]    Therapy Diagnosis:   Encounter Diagnoses   Name Primary?    Acute pain of right knee     Impaired functional mobility, balance, gait, and endurance     Decreased range of motion of right knee      Physician: Urbano Vigil MD    Visit Date: 12/19/2019    Physician Orders: PT Eval and Treat  Medical Diagnosis from Referral: M17.12 (ICD-10-CM) - Primary osteoarthritis of left knee  Evaluation Date: 12/10/2019  Authorization Period Expiration: 1/26/2020  Plan of Care Expiration: 1/24/2020  Visit # / Visits authorized: 6/12  FOTO: 6/10 DONE  PTA :0/5  Gcode: 6/10  Visit:  88.10  Total: 344.18     Time In: 1:05  Time Out: 2:10  Total Billable Time: 55 minutes (4 TE)     Precautions: Standard, Right TKA on 12/9/2019    Subjective     Pt reports: he is doing well and had no pain walking into the clinic today   He was compliant with home exercise program.  Response to previous treatment: no adverse effects  Functional change: none yet    Pain: 0/10  Location: right knee      Objective     Sy received the following manual therapy techniques: Joint mobilizations were applied to the: right knee for 0 minutes, including:  Patellar mobilizations in all directions  Extension mobilizations  IASTM to R knee: IT band, medial/ lateral joint line, distal quadriceps mm heads    Sy received therapeutic exercises to develop strength, ROM and flexibility for 55 minutes including:  Quad Sets: 5" hold, 25x  SLR 4x8  Clams: 30x R  Heel slides: 15x10'' holds with two straps + SB  R knee extension stretch 4' - Not today  Standing TKE Purple SC 5" hold, 20x  Heel raises: 2x10, R SL bias   Mini squats: 2x10 DL  Fitter 30" x 3 R SL  Bike at end for ROM 5'    Sy received cold pack to right knee for 10 minutes at end of session    Home Exercises Provided and Patient Education Provided   Education provided:   - Continue HEP  - Home modalities " prn    Written Home Exercises Provided: yes.  Exercises were reviewed and Sy was able to demonstrate them prior to the end of the session.  Sy demonstrated good  understanding of the education provided.     See EMR under Patient Instructions for exercises provided on evaluation.    Assessment     Incision looks good with scabbing and no drainage today. L medial ankle pain with crepitus limiting heel raises due to L ankle pain; pt was able to perform with decreased LLE WB. Pt progressing well and continue to add in more standing exercises. PT advised pt to start walking with SPC in and outside of the house.  Sy is progressing well towards his goals.   Pt prognosis is Excellent.     Pt will continue to benefit from skilled outpatient physical therapy to address the deficits listed in the problem list box on initial evaluation, provide pt/family education and to maximize pt's level of independence in the home and community environment.   Pt's spiritual, cultural and educational needs considered and pt agreeable to plan of care and goals.    Anticipated barriers to physical therapy: High BMI    Goals:   Short Term Goals:     1. Pt will be independent with HEP supplement PT in improving functional mobility. (Progressing, not met)  2. Pt will improve RLE strength to at least 75% of LLE strength as measured via MicroFet handheld dynamometer in order to improve functional mobility (Progresing, not met)  3. Pt will improve R knee AROM to at least 90 degrees of flexion and -7 degrees of extension in order to improve gait (Progressing, not met)     Long Term Goals:  1. Pt will be independent with updated HEP supplement PT in improving functional mobility. (Progressing, not met)  2. Pt will improve R LE strength to at least 90% of L LE strength as measured via MicroFet handheld dynamometer in order to improve functional mobility (Progressing, not met)  3. Pt will improve R knee AROM to at least 110 degrees of flexion  and 0 degrees of extension in order to improve gait and ability to perform ADLs (Progressing, not met)  4. Pt will improve FOTO knee survey score to </= 40% limited in order to demo improved functional mobility (Progressing, not met)  5. Pt will improve score on Function,Daily Living section of KOOS to at least 21/68, (30.8% limited) in order to demo improved functional mobility (Progressing, not met)  6. Pt will perform TUG in < 10 seconds without AD in order to demo improved gait speed (Progressing, not met)  7. Pt will perform at least 16 sit to stands without UE support on 30 second sit to stand test in order to demo improved ability to perform transfers (Progressing, not met)    Plan     Continue POC to advance toward functional goals.     Wilfrido Amador, PT

## 2019-12-20 ENCOUNTER — CLINICAL SUPPORT (OUTPATIENT)
Dept: REHABILITATION | Facility: HOSPITAL | Age: 64
End: 2019-12-20
Attending: ORTHOPAEDIC SURGERY
Payer: MEDICARE

## 2019-12-20 DIAGNOSIS — M25.561 ACUTE PAIN OF RIGHT KNEE: ICD-10-CM

## 2019-12-20 DIAGNOSIS — M25.661 DECREASED RANGE OF MOTION OF RIGHT KNEE: ICD-10-CM

## 2019-12-20 DIAGNOSIS — Z74.09 IMPAIRED FUNCTIONAL MOBILITY, BALANCE, GAIT, AND ENDURANCE: ICD-10-CM

## 2019-12-20 PROCEDURE — 97110 THERAPEUTIC EXERCISES: CPT | Mod: PN | Performed by: PHYSICAL THERAPIST

## 2019-12-20 PROCEDURE — 97140 MANUAL THERAPY 1/> REGIONS: CPT | Mod: PN | Performed by: PHYSICAL THERAPIST

## 2019-12-20 NOTE — PROGRESS NOTES
"  Physical Therapy Daily Treatment Note     Name: Sy Aguiar  Clinic Number: [id number]    Therapy Diagnosis:   Encounter Diagnoses   Name Primary?    Acute pain of right knee     Impaired functional mobility, balance, gait, and endurance     Decreased range of motion of right knee      Physician: Urbano Vigil MD    Visit Date: 12/20/2019    Physician Orders: PT Eval and Treat  Medical Diagnosis from Referral: M17.12 (ICD-10-CM) - Primary osteoarthritis of left knee  Evaluation Date: 12/10/2019  Authorization Period Expiration: 1/26/2020  Plan of Care Expiration: 1/24/2020  Visit # / Visits authorized: 7/12  FOTO: 7/10   PTA :0/5  Gcode: 7/10  Visit:  88.10  Total: 344.18     Time In: 1:00  Time Out: 2:10  Total Billable Time: 60 minutes (1MT,3 TE)     Precautions: Standard, Right TKA on 12/9/2019    Subjective     Pt reports: Minimal discomfort but feeling better, walking with SC at this time.    He was compliant with home exercise program.  Response to previous treatment: no adverse effects  Functional change: none yet    Pain: 0/10  Location: right knee      Objective     Sy received the following manual therapy techniques: Joint mobilizations were applied to the: right knee for 15 minutes, including:  Patellar mobilizations in all directions  Extension mobilizations  ASTYM to right knee, only anterior    Sy received therapeutic exercises to develop strength, ROM and flexibility for 45 minutes including:  Quad Sets: 5" hold, 25x  SLR 4x8 2#  Clams: 30x R RTB  Heel slides: 15x10'' holds with two straps + SB  R knee extension stretch 4' 2#  Standing TKE Purple SC 5" hold, 30x  Mini squats: 2x10 DL  Fitter 30" x 3 R SL  Bike at end for ROM 5'  cybex knee extension 10# up with DL down with SL    Sy received cold pack to right knee for 10 minutes at end of session    Home Exercises Provided and Patient Education Provided   Education provided:   - Continue HEP  - Home modalities prn    Written Home " Exercises Provided: yes.  Exercises were reviewed and Sy was able to demonstrate them prior to the end of the session.  Sy demonstrated good  understanding of the education provided.     See EMR under Patient Instructions for exercises provided on evaluation.    Assessment     Pt responds well to treatment plan and presents with good strength at this time. Down to SC, improved ROM to 0-100 with good quad control. Continue progressing weight bearing therex. .  Sy is progressing well towards his goals.   Pt prognosis is Excellent.     Pt will continue to benefit from skilled outpatient physical therapy to address the deficits listed in the problem list box on initial evaluation, provide pt/family education and to maximize pt's level of independence in the home and community environment.   Pt's spiritual, cultural and educational needs considered and pt agreeable to plan of care and goals.    Anticipated barriers to physical therapy: High BMI    Goals:   Short Term Goals:     1. Pt will be independent with HEP supplement PT in improving functional mobility. (Progressing, not met)  2. Pt will improve RLE strength to at least 75% of LLE strength as measured via MicroFet handheld dynamometer in order to improve functional mobility (Progresing, not met)  3. Pt will improve R knee AROM to at least 90 degrees of flexion and -7 degrees of extension in order to improve gait (Progressing, not met)     Long Term Goals:  1. Pt will be independent with updated HEP supplement PT in improving functional mobility. (Progressing, not met)  2. Pt will improve R LE strength to at least 90% of L LE strength as measured via MicroFet handheld dynamometer in order to improve functional mobility (Progressing, not met)  3. Pt will improve R knee AROM to at least 110 degrees of flexion and 0 degrees of extension in order to improve gait and ability to perform ADLs (Progressing, not met)  4. Pt will improve FOTO knee survey score to </=  40% limited in order to demo improved functional mobility (Progressing, not met)  5. Pt will improve score on Function,Daily Living section of KOOS to at least 21/68, (30.8% limited) in order to demo improved functional mobility (Progressing, not met)  6. Pt will perform TUG in < 10 seconds without AD in order to demo improved gait speed (Progressing, not met)  7. Pt will perform at least 16 sit to stands without UE support on 30 second sit to stand test in order to demo improved ability to perform transfers (Progressing, not met)    Plan     Continue POC to advance toward functional goals.     Raheem Bajwa, PT

## 2019-12-23 ENCOUNTER — CLINICAL SUPPORT (OUTPATIENT)
Dept: REHABILITATION | Facility: HOSPITAL | Age: 64
End: 2019-12-23
Attending: ORTHOPAEDIC SURGERY
Payer: MEDICARE

## 2019-12-23 ENCOUNTER — DOCUMENTATION ONLY (OUTPATIENT)
Dept: REHABILITATION | Facility: HOSPITAL | Age: 64
End: 2019-12-23

## 2019-12-23 DIAGNOSIS — M25.661 DECREASED RANGE OF MOTION OF RIGHT KNEE: ICD-10-CM

## 2019-12-23 DIAGNOSIS — M25.561 ACUTE PAIN OF RIGHT KNEE: ICD-10-CM

## 2019-12-23 DIAGNOSIS — Z74.09 IMPAIRED FUNCTIONAL MOBILITY, BALANCE, GAIT, AND ENDURANCE: ICD-10-CM

## 2019-12-23 PROCEDURE — 97110 THERAPEUTIC EXERCISES: CPT | Mod: PN

## 2019-12-23 PROCEDURE — 97140 MANUAL THERAPY 1/> REGIONS: CPT | Mod: PN

## 2019-12-23 NOTE — PROGRESS NOTES
"  Physical Therapy Daily Treatment Note     Name: Sy Aguiar  Clinic Number: [id number]    Therapy Diagnosis:   Encounter Diagnoses   Name Primary?    Acute pain of right knee     Impaired functional mobility, balance, gait, and endurance     Decreased range of motion of right knee      Physician: Urbano Vigil MD    Visit Date: 12/23/2019    Physician Orders: PT Eval and Treat  Medical Diagnosis from Referral: M17.12 (ICD-10-CM) - Primary osteoarthritis of left knee  Evaluation Date: 12/10/2019  Authorization Period Expiration: 1/26/2020  Plan of Care Expiration: 1/24/2020  Visit # / Visits authorized: 8/12  FOTO: 8/10   PTA :0/5  Gcode: 8/10  Visit:  88.10  Total: 432.28     Time In: 11:52 AM  Time Out: 12:52 PM  Total Billable Time: 40 minutes (1 MT, 2 TE)     Precautions: Standard, Right TKA on 12/9/2019    Subjective     Pt reports: Some increased stiffness today, but no pain.    He was compliant with home exercise program.  Response to previous treatment: no adverse effects  Functional change: none yet    Pain: 0/10  Location: right knee      Objective     Sy received the following manual therapy techniques: Joint mobilizations were applied to the: right knee for 10 minutes, including:  Patellar mobilizations in all directions  Extension mobilizations  IASTM  to right knee    Sy received therapeutic exercises to develop strength, ROM and flexibility for 30 minutes including objective measurements:    Recumbent Bike: Level 1, 5 minutes, full revolutions for increased ROM  Quad Sets: 5" hold, 25x  SLR 4x8 2#  Clams: 30x R GTB  Heel slides: 6x30" holds with two straps + SB  R knee extension stretch 4' 2# - OOT    Mini squats: 3x10 DL  Standing TKE Purple SC 5" hold, 30x - OOT  Fitter 30" x 3 R SL - OOT  Cybex knee extension 10# up with DL down with SL - OOT      Knee AROM/PROM Right   Knee Flexion 110/NT   Knee Extension -5/-3   *denotes pain with movement  *NT = not tested    TUG:  15 seconds (w/ " "SPC)     30 second sit-to-stand test (without U/E support): 9 without UE support         Sy received cold pack to right knee for 10 minutes at end of session    Home Exercises Provided and Patient Education Provided   Education provided:   - Continue HEP  - Home modalities prn    Written Home Exercises Provided: yes.  Exercises were reviewed and Sy was able to demonstrate them prior to the end of the session.  Sy demonstrated good  understanding of the education provided.     See EMR under Patient Instructions for exercises provided on evaluation.    Assessment     Session limited due to late arrival. Objective measurements performed with improvements in 30" sit to stand test without UE support. Patient was also able to complete TUG in 15 seconds with SPC, which is an improvement from use of RW during previous testing. Right knee flexion AROM has also improved from 105 degrees to 110 degrees.   Sy is progressing well towards his goals.   Pt prognosis is Excellent.     Pt will continue to benefit from skilled outpatient physical therapy to address the deficits listed in the problem list box on initial evaluation, provide pt/family education and to maximize pt's level of independence in the home and community environment.   Pt's spiritual, cultural and educational needs considered and pt agreeable to plan of care and goals.    Anticipated barriers to physical therapy: High BMI    Goals:   Short Term Goals:     1. Pt will be independent with HEP supplement PT in improving functional mobility. (Progressing, not met)  2. Pt will improve RLE strength to at least 75% of LLE strength as measured via MicroFet handheld dynamometer in order to improve functional mobility (Progresing, not met)  3. Pt will improve R knee AROM to at least 90 degrees of flexion and -7 degrees of extension in order to improve gait (Progressing, not met)     Long Term Goals:  1. Pt will be independent with updated HEP supplement PT in " improving functional mobility. (Progressing, not met)  2. Pt will improve R LE strength to at least 90% of L LE strength as measured via MicroFet handheld dynamometer in order to improve functional mobility (Progressing, not met)  3. Pt will improve R knee AROM to at least 110 degrees of flexion and 0 degrees of extension in order to improve gait and ability to perform ADLs (Progressing, not met)  4. Pt will improve FOTO knee survey score to </= 40% limited in order to demo improved functional mobility (Progressing, not met)  5. Pt will improve score on Function,Daily Living section of KOOS to at least 21/68, (30.8% limited) in order to demo improved functional mobility (Progressing, not met)  6. Pt will perform TUG in < 10 seconds without AD in order to demo improved gait speed (Progressing, not met)  7. Pt will perform at least 16 sit to stands without UE support on 30 second sit to stand test in order to demo improved ability to perform transfers (Progressing, not met)    Plan     Continue POC to advance toward functional goals.   Resume all TE next visit.     Leena Maloney, PT

## 2019-12-24 ENCOUNTER — CLINICAL SUPPORT (OUTPATIENT)
Dept: REHABILITATION | Facility: HOSPITAL | Age: 64
End: 2019-12-24
Attending: ORTHOPAEDIC SURGERY
Payer: MEDICARE

## 2019-12-24 DIAGNOSIS — M25.561 ACUTE PAIN OF RIGHT KNEE: ICD-10-CM

## 2019-12-24 DIAGNOSIS — M25.661 DECREASED RANGE OF MOTION OF RIGHT KNEE: ICD-10-CM

## 2019-12-24 DIAGNOSIS — Z74.09 IMPAIRED FUNCTIONAL MOBILITY, BALANCE, GAIT, AND ENDURANCE: ICD-10-CM

## 2019-12-24 PROCEDURE — 97110 THERAPEUTIC EXERCISES: CPT | Mod: PN

## 2019-12-24 PROCEDURE — 97140 MANUAL THERAPY 1/> REGIONS: CPT | Mod: PN

## 2019-12-24 NOTE — PROGRESS NOTES
"  Physical Therapy Daily Treatment Note     Name: Sy Aguiar  Clinic Number: [id number]    Therapy Diagnosis:   Encounter Diagnoses   Name Primary?    Acute pain of right knee     Impaired functional mobility, balance, gait, and endurance     Decreased range of motion of right knee      Physician: Urbano Vigil MD    Visit Date: 12/24/2019    Physician Orders: PT Eval and Treat  Medical Diagnosis from Referral: M17.12 (ICD-10-CM) - Primary osteoarthritis of left knee  Evaluation Date: 12/10/2019  Authorization Period Expiration: 1/26/2020  Plan of Care Expiration: 1/24/2020  Visit # / Visits authorized: 9/12  FOTO: 9/10  NEXT  PTA :1/5  Gcode: 9/10 NEXT  Visit:  118.42  Total: 432.28     Time In: 12:00 PM  Time Out: 1:10 PM  Total Billable Time: 55 minutes (1 MT, 3 TE)     Precautions: Standard, Right TKA on 12/9/2019    Subjective     Pt reports: "Stiffness" today, but no pain.    He was compliant with home exercise program.  Response to previous treatment: no adverse effects  Functional change: none yet    Pain: 0/10  Location: right knee      Objective     Sy received the following manual therapy techniques: Joint mobilizations were applied to the: right knee for 10 minutes, including:  Patellar mobilizations in all directions  Extension mobilizations    Sy received therapeutic exercises to develop strength, ROM and flexibility for 45 minutes including objective measurements:    Recumbent Bike: Level 1, 5 minutes, full revolutions for increased ROM  Quad Sets: 5" hold, 25x  SLR 4x8 2#  Clams: 30x R GTB  Heel slides: 6x30" holds with two straps + SB  R knee extension stretch 4' 2# - OOT (performed with cold pack)    Mini squats: 3x10 DL  Standing TKE Purple SC 5" hold, 30x - OOT  Fitter 30" x 3 R SL - OOT  Cybex knee extension 30# up with DL down with SL 1x10  Cybex Leg Press 5.0 2x10 DL    Sy received cold pack to right knee for 10 minutes with extension and elevation at end of session    Home " "Exercises Provided and Patient Education Provided   Education provided:   - Continue HEP  - Home modalities prn    Written Home Exercises Provided: yes.  Exercises were reviewed and Sy was able to demonstrate them prior to the end of the session.  Sy demonstrated good  understanding of the education provided.     See EMR under Patient Instructions for exercises provided 12/10/2019.    Assessment     Patient able to increase activity today including trial of Leg Press without complaint of increased pain or difficulty.  States "good now" after treatment.  Sy is progressing well towards his goals.   Pt prognosis is Excellent.     Pt will continue to benefit from skilled outpatient physical therapy to address the deficits listed in the problem list box on initial evaluation, provide pt/family education and to maximize pt's level of independence in the home and community environment.   Pt's spiritual, cultural and educational needs considered and pt agreeable to plan of care and goals.    Anticipated barriers to physical therapy: High BMI    Goals:   Short Term Goals:     1. Pt will be independent with HEP supplement PT in improving functional mobility. (Progressing, not met)  2. Pt will improve RLE strength to at least 75% of LLE strength as measured via MicroFet handheld dynamometer in order to improve functional mobility (Progresing, not met)  3. Pt will improve R knee AROM to at least 90 degrees of flexion and -7 degrees of extension in order to improve gait (Progressing, not met)     Long Term Goals:  1. Pt will be independent with updated HEP supplement PT in improving functional mobility. (Progressing, not met)  2. Pt will improve R LE strength to at least 90% of L LE strength as measured via MicroFet handheld dynamometer in order to improve functional mobility (Progressing, not met)  3. Pt will improve R knee AROM to at least 110 degrees of flexion and 0 degrees of extension in order to improve gait and " ability to perform ADLs (Progressing, not met)  4. Pt will improve FOTO knee survey score to </= 40% limited in order to demo improved functional mobility (Progressing, not met)  5. Pt will improve score on Function,Daily Living section of KOOS to at least 21/68, (30.8% limited) in order to demo improved functional mobility (Progressing, not met)  6. Pt will perform TUG in < 10 seconds without AD in order to demo improved gait speed (Progressing, not met)  7. Pt will perform at least 16 sit to stands without UE support on 30 second sit to stand test in order to demo improved ability to perform transfers (Progressing, not met)    Plan     Continue POC to advance toward functional goals.        Nevaeh Fraser, PTA

## 2019-12-26 ENCOUNTER — CLINICAL SUPPORT (OUTPATIENT)
Dept: REHABILITATION | Facility: HOSPITAL | Age: 64
End: 2019-12-26
Attending: ORTHOPAEDIC SURGERY
Payer: MEDICARE

## 2019-12-26 DIAGNOSIS — M25.661 DECREASED RANGE OF MOTION OF RIGHT KNEE: ICD-10-CM

## 2019-12-26 DIAGNOSIS — M25.561 ACUTE PAIN OF RIGHT KNEE: ICD-10-CM

## 2019-12-26 DIAGNOSIS — Z74.09 IMPAIRED FUNCTIONAL MOBILITY, BALANCE, GAIT, AND ENDURANCE: ICD-10-CM

## 2019-12-26 PROCEDURE — 97110 THERAPEUTIC EXERCISES: CPT | Mod: PN

## 2019-12-26 PROCEDURE — 97140 MANUAL THERAPY 1/> REGIONS: CPT | Mod: PN

## 2019-12-26 PROCEDURE — G8979 MOBILITY GOAL STATUS: HCPCS | Mod: CK,PN

## 2019-12-26 PROCEDURE — G8978 MOBILITY CURRENT STATUS: HCPCS | Mod: CK,PN

## 2019-12-26 NOTE — PROGRESS NOTES
Face to Face PTA Conference performed with the following regarding patient's current status, overall progress, and plan of care:  Nevaeh Fraser PTA, Felicita Carrillo PTA, Waylon Moreau PTA and Jeffrey Moe PTA     Leena Maloney, PT, DPT    Felicita Carrillo, PTA     Nevaeh Fraser, PTA    Jeffrey Moe, PTA     Waylon Moreau, PTA

## 2019-12-26 NOTE — PROGRESS NOTES
"  Physical Therapy Daily Treatment Note     Name: Sy Aguiar  Clinic Number: [id number]    Therapy Diagnosis:   Encounter Diagnoses   Name Primary?    Acute pain of right knee     Impaired functional mobility, balance, gait, and endurance     Decreased range of motion of right knee      Physician: Urbano Vigil MD    Visit Date: 12/26/2019    Physician Orders: PT Eval and Treat  Medical Diagnosis from Referral: M17.12 (ICD-10-CM) - Primary osteoarthritis of left knee  Evaluation Date: 12/10/2019  Authorization Period Expiration: 1/26/2020  Plan of Care Expiration: 1/24/2020  Visit # / Visits authorized: 10/12  FOTO: 10/10 DONE  PTA :1/5  Gcode: 10/10 DONE  Visit:  118.42  Total: 550.70     Time In: 11:45 AM  Time Out: 1:14 PM  Total Billable Time: 75 minutes (1 MT, 4 TE)     Precautions: Standard, Right TKA on 12/9/2019    Subjective     Pt reports: Feeling stiff today with no pain. States that last night was a "rough night" as he had some tingling and pain that prevented him from sleeping, however once he donned his compression stocking the sensations decreased. Went to MD appointment today and was told everything is on track and he's doing well.    He was compliant with home exercise program.  Response to previous treatment: no adverse effects  Functional change: Ambulation with walking stick    Pain: 0/10  Location: right knee      Objective     Sy received the following manual therapy techniques: Joint mobilizations were applied to the: right knee for 10 minutes, including:  Patellar mobilizations in all directions  Extension mobilizations  Superior patellar glides with overpressure into knee extension     Sy received therapeutic exercises to develop strength, ROM and flexibility for 65 minutes including objective measurements:    Recumbent Bike: Level 2, 8 minutes, full revolutions for increased ROM  Quad Sets: 5" hold, 25x, towel roll under ankle  Heel slides: 5x30" holds, last repetition is for 1 " "minute, two straps + SB  SLR 4x8 2#  Bridges: 3x10  Supine Hip ADD: 3x10, 5" holds  Sidelying Clams: 30x R GTB  R knee extension stretch 4' 2# - (performed with cold pack)    Mini squats: 3x10 DL  Heel Raises: attempted but held due to left ankle pain  Fwd Step-ups: x20 with RLE leading, 6" step  Lateral Step-ups: x15 alternating, 6" step  Standing TKE Purple SC 5" hold, 30x  Fitter 30" x 3 R SL     Cybex knee extension 30# up with DL down with SL 3x10  Cybex Leg Press 5.0 2x10 DL - OOT      Sy received cold pack to right knee for 10 minutes with extension and elevation at end of session    CMS Impairment/Limitation/Restriction for FOTO Knee Survey    Therapist reviewed FOTO scores for Sy Aguiar on 12/26/2019.   FOTO documents entered into [facility] - see Media section.    Limitation Score: 54%  Category: Mobility    Current : CK = at least 40% but < 60% impaired, limited or restricted  Goal: CK = at least 40% but < 60% impaired, limited or restricted           Home Exercises Provided and Patient Education Provided   Education provided:   - Continue HEP  - Home modalities prn    Written Home Exercises Provided: yes.  Exercises were reviewed and Sy was able to demonstrate them prior to the end of the session.  Sy demonstrated good  understanding of the education provided.     See EMR under Patient Instructions for exercises provided 12/10/2019.    Assessment     Tolerated addition of bridges, supine hip ADD, forward and lateral step-ups. No increases in pain or discomfort noted, however LE fatigue noted following forward and lateral step-ups.   Sy is progressing well towards his goals.   Pt prognosis is Excellent.     Pt will continue to benefit from skilled outpatient physical therapy to address the deficits listed in the problem list box on initial evaluation, provide pt/family education and to maximize pt's level of independence in the home and community environment.   Pt's spiritual, cultural and " educational needs considered and pt agreeable to plan of care and goals.    Anticipated barriers to physical therapy: High BMI    Goals:   Short Term Goals:     1. Pt will be independent with HEP supplement PT in improving functional mobility. (Progressing, not met)  2. Pt will improve RLE strength to at least 75% of LLE strength as measured via MicroFet handheld dynamometer in order to improve functional mobility (Progresing, not met)  3. Pt will improve R knee AROM to at least 90 degrees of flexion and -7 degrees of extension in order to improve gait (Progressing, not met)     Long Term Goals:  1. Pt will be independent with updated HEP supplement PT in improving functional mobility. (Progressing, not met)  2. Pt will improve R LE strength to at least 90% of L LE strength as measured via MicroFet handheld dynamometer in order to improve functional mobility (Progressing, not met)  3. Pt will improve R knee AROM to at least 110 degrees of flexion and 0 degrees of extension in order to improve gait and ability to perform ADLs (Progressing, not met)  4. Pt will improve FOTO knee survey score to </= 40% limited in order to demo improved functional mobility (Progressing, not met)  5. Pt will improve score on Function,Daily Living section of KOOS to at least 21/68, (30.8% limited) in order to demo improved functional mobility (Progressing, not met)  6. Pt will perform TUG in < 10 seconds without AD in order to demo improved gait speed (Progressing, not met)  7. Pt will perform at least 16 sit to stands without UE support on 30 second sit to stand test in order to demo improved ability to perform transfers (Progressing, not met)    Plan     Continue POC to advance toward functional goals.        Leena Maloney, PT

## 2019-12-30 ENCOUNTER — CLINICAL SUPPORT (OUTPATIENT)
Dept: REHABILITATION | Facility: HOSPITAL | Age: 64
End: 2019-12-30
Attending: ORTHOPAEDIC SURGERY
Payer: MEDICARE

## 2019-12-30 DIAGNOSIS — M25.561 ACUTE PAIN OF RIGHT KNEE: ICD-10-CM

## 2019-12-30 DIAGNOSIS — Z74.09 IMPAIRED FUNCTIONAL MOBILITY, BALANCE, GAIT, AND ENDURANCE: ICD-10-CM

## 2019-12-30 DIAGNOSIS — M25.661 DECREASED RANGE OF MOTION OF RIGHT KNEE: ICD-10-CM

## 2019-12-30 PROCEDURE — 97110 THERAPEUTIC EXERCISES: CPT | Mod: PN

## 2019-12-30 NOTE — PROGRESS NOTES
"  Physical Therapy Daily Treatment Note     Name: Sy Aguiar  Clinic Number: [id number]    Therapy Diagnosis:   Encounter Diagnoses   Name Primary?    Acute pain of right knee     Impaired functional mobility, balance, gait, and endurance     Decreased range of motion of right knee      Physician: Urbano Vigil MD    Visit Date: 12/30/2019    Physician Orders: PT Eval and Treat  Medical Diagnosis from Referral: M17.12 (ICD-10-CM) - Primary osteoarthritis of left knee  Evaluation Date: 12/10/2019  Authorization Period Expiration: 1/26/2020  Plan of Care Expiration: 1/26/2020  Visit # / Visits authorized: 11/12  FOTO/G-Code: 1/10   PTA :1/5  Visit:  121.28  Total: 671.98     Time In: 1:00 PM  Time Out: 2:15 PM  Total Billable Time: 60 minutes (4 TE)     Precautions: Standard, Right TKA on 12/9/2019    Subjective     Pt reports: that he felt fine after his last session, however for the next couple of days following, he had some increased pain and stiffness and felt like everything was "deteriorating"   He was compliant with home exercise program.  Response to previous treatment: no adverse effects  Functional change: Ambulation with walking stick    Pain: 0/10  Location: right knee      Objective     Sy received the following manual therapy techniques: Joint mobilizations were applied to the: right knee for 5 minutes, including:  Patellar mobilizations in all directions  Superior patellar glides with overpressure into knee extension     Sy received therapeutic exercises to develop strength, ROM and flexibility for 55 minutes including objective measurements:    Recumbent Bike: Level 2, 8 minutes, full revolutions for increased ROM  Quad Sets: 5" hold, 25x, towel roll under ankle  Heel slides: 5x30" holds, last repetition is for 1 minute, two straps + SB  SLR 4x8 2#  Bridges: 3x10  Supine Hip ADD: 3x10, 5" holds  Sidelying Clams: 30x R GTB  R knee extension stretch 4' 2# - (performed with cold pack)    Mini " "squats: 3x10  Heel Raises: attempted but held due to left ankle pain  Fwd Step-ups: x20 with RLE leading, 6" step  Lateral Step-ups: x20 alternating, 6" step  Standing TKE Purple SC 5" hold, 30x - OOT  Fitter 30" x 3 R SL - OOT    Cybex knee extension 30# up with DL down with SL 3x10 - OOT  Cybex Leg Press 5.0 2x10 DL - OOT      Improvements bolded below:    L/E Strength w/ MicroFET Muscle Luz Dynamometer Right Left Pain/Dysfunction with Movement   (approx 4 sec hold w/ max contraction)   Hip Flexion 21.0 kg  20.5 kg      Hip Abduction 23.5 kg  22.3 kg      Quadriceps 20.1 kg  25.4 kg      Hamstrings 13.9 kg  16.5 kg           Knee AROM/PROM Right   Knee Flexion 111/115   Knee Extension -1/0   *denotes pain with movement  *NT = not tested     TU seconds (w/o AD)     30 second sit-to-stand test: 18 without UE support        Sy received cold pack to anterior and posterior right knee for 10 minutes with extension and elevation at end of session.         Home Exercises Provided and Patient Education Provided   Education provided:   - Continue HEP  - Home modalities prn    Written Home Exercises Provided: yes.  Exercises were reviewed and Sy was able to demonstrate them prior to the end of the session.  Sy demonstrated good  understanding of the education provided.     See EMR under Patient Instructions for exercises provided 12/10/2019.    Assessment     Sy is progressing well with skilled physical therapy as evident by improvement in all of his objective measures documented above. Bilateral lower extremity strength has increased, patient was able to perform TUG in 9 seconds without an AD, and improved 30" sit to stand score to 18 without UE use. Currently patient has met all STG's and 3/7 LTG's. Patient continues to have ROM deficits as well as impaired gait. He would benefit from continuing skilled physical therapy to further improve gait mechanics and right knee AROM.     Sy is progressing well " towards his goals.   Pt prognosis is Excellent.     Pt will continue to benefit from skilled outpatient physical therapy to address the deficits listed in the problem list box on initial evaluation, provide pt/family education and to maximize pt's level of independence in the home and community environment.   Pt's spiritual, cultural and educational needs considered and pt agreeable to plan of care and goals.    Anticipated barriers to physical therapy: High BMI    Goals:   Short Term Goals:     1. Pt will be independent with HEP supplement PT in improving functional mobility. MET 12/30/19  2. Pt will improve RLE strength to at least 75% of LLE strength as measured via MicroFet handheld dynamometer in order to improve functional mobility MET 12/30/19  3. Pt will improve R knee AROM to at least 90 degrees of flexion and -7 degrees of extension in order to improve gait MET 12/30/19     Long Term Goals:  1. Pt will be independent with updated HEP supplement PT in improving functional mobility. (Progressing, not met)  2. Pt will improve R LE strength to at least 90% of L LE strength as measured via MicroFet handheld dynamometer in order to improve functional mobility MET 12/30/19  3. Pt will improve R knee AROM to at least 110 degrees of flexion and 0 degrees of extension in order to improve gait and ability to perform ADLs FLEXION MET 12/30/19, Extension PROGRESSING  4. Pt will improve FOTO knee survey score to </= 40% limited in order to demo improved functional mobility (Progressing, not met)  5. Pt will improve score on Function,Daily Living section of KOOS to at least 21/68, (30.8% limited) in order to demo improved functional mobility (Progressing, not met)  6. Pt will perform TUG in < 10 seconds without AD in order to demo improved gait speed MET 12/30/19  7. Pt will perform at least 16 sit to stands without UE support on 30 second sit to stand test in order to demo improved ability to perform transfers MET  12/30/19    Plan     Resume all TE as able.     Leena Maloney, PT

## 2020-01-02 ENCOUNTER — CLINICAL SUPPORT (OUTPATIENT)
Dept: REHABILITATION | Facility: HOSPITAL | Age: 65
End: 2020-01-02
Attending: ORTHOPAEDIC SURGERY
Payer: MEDICARE

## 2020-01-02 DIAGNOSIS — Z74.09 IMPAIRED FUNCTIONAL MOBILITY, BALANCE, GAIT, AND ENDURANCE: ICD-10-CM

## 2020-01-02 DIAGNOSIS — M25.561 ACUTE PAIN OF RIGHT KNEE: ICD-10-CM

## 2020-01-02 DIAGNOSIS — M25.661 DECREASED RANGE OF MOTION OF RIGHT KNEE: ICD-10-CM

## 2020-01-02 PROCEDURE — 97140 MANUAL THERAPY 1/> REGIONS: CPT | Mod: PN | Performed by: PHYSICAL THERAPIST

## 2020-01-02 PROCEDURE — 97110 THERAPEUTIC EXERCISES: CPT | Mod: PN | Performed by: PHYSICAL THERAPIST

## 2020-01-02 NOTE — PROGRESS NOTES
"   Physical Therapy Daily Treatment Note     Name: Sy Aguiar  Clinic Number: [id number]    Therapy Diagnosis:   Encounter Diagnoses   Name Primary?    Acute pain of right knee     Impaired functional mobility, balance, gait, and endurance     Decreased range of motion of right knee      Physician: Urbano Vigil MD    Visit Date: 1/2/2020    Physician Orders: PT Eval and Treat  Medical Diagnosis from Referral: M17.12 (ICD-10-CM) - Primary osteoarthritis of left knee  Evaluation Date: 12/10/2019  Authorization Period Expiration: 1/26/2020  Plan of Care Expiration: 1/26/2020  Visit # / Visits authorized: 12/12  FOTO: 1/5     Visit:  118.42  Total: 118.42     Time In: 1:00 PM  Time Out: 2:15 PM  Total Billable Time: 65 minutes     Precautions: Standard, Right TKA on 12/9/2019    Subjective     Pt reports: continued improvement in right knee pain, stiffness   He was compliant with home exercise program.  Response to previous treatment: no adverse effects  Functional change: Ambulation with walking stick only in community    Pain: 0/10  Location: right knee      Objective     Sy received the following manual therapy techniques: Joint mobilizations were applied to the: right knee for 10 minutes, including:  Patellar mobilizations in all directions  ASTYM to anterior and posterior knee  Extension mobilizations to end range.     Sy received therapeutic exercises to develop strength, ROM and flexibility for 55 minutes including objective measurements:    Recumbent Bike: Level 2, 8 minutes, full revolutions for increased ROM  Heel slides: 5x30" holds, last repetition is for 1 minute, two straps + SB  SLR 4x8 2#   Sidelying Clams: 30x R GTB  R knee extension stretch 4' 2#     Fwd Step-ups: x20 with RLE leading, 6" step  Lateral Step-ups: x20 alternating, 6" step  Standing TKE Purple SC 5" hold, 30x   Fitter 30" x 3 R SL     Cybex knee extension 30# up with DL down with SL 3x10   Cybex Leg Press 6.0 3x10 DL "     Sy received cold pack to anterior and posterior right knee for 10 minutes with extension and elevation at end of session.     Home Exercises Provided and Patient Education Provided   Education provided:   - Continue HEP  - Home modalities prn    Written Home Exercises Provided: yes.  Exercises were reviewed and Sy was able to demonstrate them prior to the end of the session.  Sy demonstrated good  understanding of the education provided.     See EMR under Patient Instructions for exercises provided 12/10/2019.    Assessment   Pt responded well to therex and manual treatments, no adverse effects or pain. Pt continues to ambulate without SC in clinic and at home.       Sy is progressing well towards his goals.   Pt prognosis is Excellent.     Pt will continue to benefit from skilled outpatient physical therapy to address the deficits listed in the problem list box on initial evaluation, provide pt/family education and to maximize pt's level of independence in the home and community environment.   Pt's spiritual, cultural and educational needs considered and pt agreeable to plan of care and goals.    Anticipated barriers to physical therapy: High BMI    Goals:   Short Term Goals:     1. Pt will be independent with HEP supplement PT in improving functional mobility. MET 12/30/19  2. Pt will improve RLE strength to at least 75% of LLE strength as measured via MicroFet handheld dynamometer in order to improve functional mobility MET 12/30/19  3. Pt will improve R knee AROM to at least 90 degrees of flexion and -7 degrees of extension in order to improve gait MET 12/30/19     Long Term Goals:  1. Pt will be independent with updated HEP supplement PT in improving functional mobility. (Progressing, not met)  2. Pt will improve R LE strength to at least 90% of L LE strength as measured via MicroFet handheld dynamometer in order to improve functional mobility MET 12/30/19  3. Pt will improve R knee AROM to at  least 110 degrees of flexion and 0 degrees of extension in order to improve gait and ability to perform ADLs FLEXION MET 12/30/19, Extension PROGRESSING  4. Pt will improve FOTO knee survey score to </= 40% limited in order to demo improved functional mobility (Progressing, not met)  5. Pt will improve score on Function,Daily Living section of KOOS to at least 21/68, (30.8% limited) in order to demo improved functional mobility (Progressing, not met)  6. Pt will perform TUG in < 10 seconds without AD in order to demo improved gait speed MET 12/30/19  7. Pt will perform at least 16 sit to stands without UE support on 30 second sit to stand test in order to demo improved ability to perform transfers MET 12/30/19    Plan     Continue POC     Raheem Bajwa, PT

## 2020-01-03 ENCOUNTER — CLINICAL SUPPORT (OUTPATIENT)
Dept: REHABILITATION | Facility: HOSPITAL | Age: 65
End: 2020-01-03
Attending: ORTHOPAEDIC SURGERY
Payer: MEDICARE

## 2020-01-03 DIAGNOSIS — M25.561 ACUTE PAIN OF RIGHT KNEE: ICD-10-CM

## 2020-01-03 DIAGNOSIS — Z74.09 IMPAIRED FUNCTIONAL MOBILITY, BALANCE, GAIT, AND ENDURANCE: ICD-10-CM

## 2020-01-03 DIAGNOSIS — M25.661 DECREASED RANGE OF MOTION OF RIGHT KNEE: ICD-10-CM

## 2020-01-03 PROCEDURE — 97140 MANUAL THERAPY 1/> REGIONS: CPT | Mod: PN | Performed by: PHYSICAL THERAPIST

## 2020-01-03 PROCEDURE — 97110 THERAPEUTIC EXERCISES: CPT | Mod: PN | Performed by: PHYSICAL THERAPIST

## 2020-01-03 NOTE — PROGRESS NOTES
"   Physical Therapy Daily Treatment Note     Name: Sy Aguiar  Clinic Number: [id number]    Therapy Diagnosis:   Encounter Diagnoses   Name Primary?    Acute pain of right knee     Impaired functional mobility, balance, gait, and endurance     Decreased range of motion of right knee      Physician: Urbano Vigil MD    Visit Date: 1/3/2020    Physician Orders: PT Eval and Treat  Medical Diagnosis from Referral: M17.12 (ICD-10-CM) - Primary osteoarthritis of left knee  Evaluation Date: 12/10/2019  Authorization Period Expiration: 1/26/2020  Plan of Care Expiration: 1/26/2020  Visit # / Visits authorized: 13/12  FOTO: 1/5     Visit:  118.42  Total: 236.84     Time In: 1:00 PM  Time Out: 2:05 PM  Total Billable Time: 55 minutes     Precautions: Standard, Right TKA on 12/9/2019    Subjective     Pt reports: felt Ok but had sharper pains later in night previous night.    He was compliant with home exercise program.  Response to previous treatment: no adverse effects  Functional change: Ambulation with walking stick only in community    Pain: 0/10  Location: right knee      Objective     Sy received the following manual therapy techniques: Joint mobilizations were applied to the: right knee for 10 minutes, including:  Patellar mobilizations in all directions  ASTYM to anterior and posterior knee  Extension mobilizations to end range.     Sy received therapeutic exercises to develop strength, ROM and flexibility for 45 minutes including objective measurements:    Recumbent Bike: Level 2, 6 minutes, full revolutions for increased ROM  Heel slides: 5x30" holds, last repetition is for 1 minute, two straps + SB  SLR 4x8 2#   Sidelying Clams: 30x R GTB  R knee extension stretch 4' 2#     Fwd Step-ups: x20 with RLE leading, 6" step  Lateral Step-ups: x20 alternating, 6" step  Standing TKE Purple SC 5" hold, 30x   Fitter 30" x 3 R SL     Cybex knee extension 30# up with DL down with SL 3x10   Cybex Leg Press 6.0 3x10 " DL     Sy received cold pack to anterior and hot pack to posterior right knee for 10 minutes with extension and elevation at end of session.     Home Exercises Provided and Patient Education Provided   Education provided:   - Continue HEP  - Home modalities prn    Written Home Exercises Provided: yes.  Exercises were reviewed and Sy was able to demonstrate them prior to the end of the session.  Sy demonstrated good  understanding of the education provided.     See EMR under Patient Instructions for exercises provided 12/10/2019.    Assessment    Pt program kept similar as previous day due to increase in symptoms. Pt reports improved ROM and strength over previous week and only symptom comes as nerve pain in lower half of RLE.     Sy is progressing well towards his goals.   Pt prognosis is Excellent.     Pt will continue to benefit from skilled outpatient physical therapy to address the deficits listed in the problem list box on initial evaluation, provide pt/family education and to maximize pt's level of independence in the home and community environment.   Pt's spiritual, cultural and educational needs considered and pt agreeable to plan of care and goals.    Anticipated barriers to physical therapy: High BMI    Goals:   Short Term Goals:     1. Pt will be independent with HEP supplement PT in improving functional mobility. MET 12/30/19  2. Pt will improve RLE strength to at least 75% of LLE strength as measured via MicroFet handheld dynamometer in order to improve functional mobility MET 12/30/19  3. Pt will improve R knee AROM to at least 90 degrees of flexion and -7 degrees of extension in order to improve gait MET 12/30/19     Long Term Goals:  1. Pt will be independent with updated HEP supplement PT in improving functional mobility. (Progressing, not met)  2. Pt will improve R LE strength to at least 90% of L LE strength as measured via MicroFet handheld dynamometer in order to improve functional  mobility MET 12/30/19  3. Pt will improve R knee AROM to at least 110 degrees of flexion and 0 degrees of extension in order to improve gait and ability to perform ADLs FLEXION MET 12/30/19, Extension PROGRESSING  4. Pt will improve FOTO knee survey score to </= 40% limited in order to demo improved functional mobility (Progressing, not met)  5. Pt will improve score on Function,Daily Living section of KOOS to at least 21/68, (30.8% limited) in order to demo improved functional mobility (Progressing, not met)  6. Pt will perform TUG in < 10 seconds without AD in order to demo improved gait speed MET 12/30/19  7. Pt will perform at least 16 sit to stands without UE support on 30 second sit to stand test in order to demo improved ability to perform transfers MET 12/30/19    Plan     Continue POC     Raheem Bajwa, PT

## 2020-01-06 ENCOUNTER — CLINICAL SUPPORT (OUTPATIENT)
Dept: REHABILITATION | Facility: HOSPITAL | Age: 65
End: 2020-01-06
Attending: ORTHOPAEDIC SURGERY
Payer: MEDICARE

## 2020-01-06 DIAGNOSIS — Z74.09 IMPAIRED FUNCTIONAL MOBILITY, BALANCE, GAIT, AND ENDURANCE: ICD-10-CM

## 2020-01-06 DIAGNOSIS — M25.561 ACUTE PAIN OF RIGHT KNEE: ICD-10-CM

## 2020-01-06 DIAGNOSIS — M25.661 DECREASED RANGE OF MOTION OF RIGHT KNEE: ICD-10-CM

## 2020-01-06 PROCEDURE — 97140 MANUAL THERAPY 1/> REGIONS: CPT | Mod: PN | Performed by: PHYSICAL THERAPIST

## 2020-01-06 PROCEDURE — 97110 THERAPEUTIC EXERCISES: CPT | Mod: PN | Performed by: PHYSICAL THERAPIST

## 2020-01-06 NOTE — PROGRESS NOTES
"   Physical Therapy Daily Treatment Note     Name: Sy Aguiar  Clinic Number: [id number]    Therapy Diagnosis:   Encounter Diagnoses   Name Primary?    Acute pain of right knee     Impaired functional mobility, balance, gait, and endurance     Decreased range of motion of right knee      Physician: Urbano Vigil MD    Visit Date: 1/6/2020    Physician Orders: PT Eval and Treat  Medical Diagnosis from Referral: M17.12 (ICD-10-CM) - Primary osteoarthritis of left knee  Evaluation Date: 12/10/2019  Authorization Period Expiration: 1/26/2020  Plan of Care Expiration: 1/26/2020  Visit # / Visits authorized: 14/12  FOTO: 2/5     Visit:  118.42  Total: 355.26     Time In: 1:00 PM  Time Out: 2:05 PM  Total Billable Time: 55 minutes     Precautions: Standard, Right TKA on 12/9/2019    Subjective     Pt reports: some discomfort in knee but no significant pains.    He was compliant with home exercise program.  Response to previous treatment: no adverse effects  Functional change: Ambulation with walking stick only in community    Pain: 0/10  Location: right knee      Objective     Sy received the following manual therapy techniques: Joint mobilizations were applied to the: right knee for 10 minutes, including:  Patellar mobilizations in all directions  ASTYM to anterior and posterior knee  Extension mobilizations to end range.     Sy received therapeutic exercises to develop strength, ROM and flexibility for 45 minutes including objective measurements:    Recumbent Bike: Level 2, 6 minutes, full revolutions for increased ROM  Heel slides: 5x30" holds, last repetition is for 1 minute, two straps + SB  SLR 4x8 2# HELD  Sidelying Clams: 30x R GTB    Fwd Step-ups: x20 with RLE leading, 6" step  Lateral Step-ups: x20 alternating, 6" step  Standing TKE Purple SC 5" hold, 30x   Fitter 30" x 3 R SL     Cybex knee extension 30# up with DL down with SL 3x10   Cybex Leg Press 6.0 3x10 DL   Step downs 4" 4x5    Sy received " cold pack to anterior and hot pack to posterior right knee for 10 minutes with extension and elevation at end of session.        Knee AROM/PROM Right Left Pain/Dysfunction with Movement       Knee Flexion 110/115 110/115     Knee Extension -2/0 0/0          L/E Strength w/ MicroFET Muscle Luz Dynamometer Right Left Pain/Dysfunction with Movement   (approx 4 sec hold w/ max contraction)   Hip Flexion 11.9 kg  18.6 kg      Hip Abduction 18.2 kg  19.6 kg      Quadriceps 17.5 kg  19.4 kg      Hamstrings 20.4 kg  19.7 kg         TU.54 seconds (w/o RW)     30 second sit-to-stand test (without U/E support): 6      Home Exercises Provided and Patient Education Provided   Education provided:   - Continue HEP  - Home modalities prn    Written Home Exercises Provided: yes.  Exercises were reviewed and Sy was able to demonstrate them prior to the end of the session.  Sy demonstrated good  understanding of the education provided.     See EMR under Patient Instructions for exercises provided 12/10/2019.    Assessment   Pt tolerated all therex and manual treatments with minimal discomfort and pain. Pt improved on all measurements and is walking without SC in clinic and home but still using for community due to balance problems.     Sy is progressing well towards his goals.   Pt prognosis is Excellent.     Pt will continue to benefit from skilled outpatient physical therapy to address the deficits listed in the problem list box on initial evaluation, provide pt/family education and to maximize pt's level of independence in the home and community environment.   Pt's spiritual, cultural and educational needs considered and pt agreeable to plan of care and goals.    Anticipated barriers to physical therapy: High BMI    Goals:   Short Term Goals:     1. Pt will be independent with HEP supplement PT in improving functional mobility. MET 19  2. Pt will improve RLE strength to at least 75% of LLE strength as  measured via MicroFet handheld dynamometer in order to improve functional mobility MET 12/30/19  3. Pt will improve R knee AROM to at least 90 degrees of flexion and -7 degrees of extension in order to improve gait MET 12/30/19     Long Term Goals:  1. Pt will be independent with updated HEP supplement PT in improving functional mobility. (Progressing, not met)  2. Pt will improve R LE strength to at least 90% of L LE strength as measured via MicroFet handheld dynamometer in order to improve functional mobility MET 12/30/19  3. Pt will improve R knee AROM to at least 110 degrees of flexion and 0 degrees of extension in order to improve gait and ability to perform ADLs FLEXION MET 12/30/19, Extension PROGRESSING  4. Pt will improve FOTO knee survey score to </= 40% limited in order to demo improved functional mobility (Progressing, not met)  5. Pt will improve score on Function,Daily Living section of KOOS to at least 21/68, (30.8% limited) in order to demo improved functional mobility (Progressing, not met)  6. Pt will perform TUG in < 10 seconds without AD in order to demo improved gait speed MET 12/30/19  7. Pt will perform at least 16 sit to stands without UE support on 30 second sit to stand test in order to demo improved ability to perform transfers MET 12/30/19    Plan     Continue POC     Raheem Bajwa, PT

## 2020-01-08 ENCOUNTER — CLINICAL SUPPORT (OUTPATIENT)
Dept: REHABILITATION | Facility: HOSPITAL | Age: 65
End: 2020-01-08
Attending: ORTHOPAEDIC SURGERY
Payer: MEDICARE

## 2020-01-08 DIAGNOSIS — M25.561 ACUTE PAIN OF RIGHT KNEE: ICD-10-CM

## 2020-01-08 DIAGNOSIS — Z74.09 IMPAIRED FUNCTIONAL MOBILITY, BALANCE, GAIT, AND ENDURANCE: ICD-10-CM

## 2020-01-08 DIAGNOSIS — M25.661 DECREASED RANGE OF MOTION OF RIGHT KNEE: ICD-10-CM

## 2020-01-08 PROCEDURE — 97110 THERAPEUTIC EXERCISES: CPT | Mod: PN | Performed by: PHYSICAL THERAPIST

## 2020-01-08 PROCEDURE — 97140 MANUAL THERAPY 1/> REGIONS: CPT | Mod: PN | Performed by: PHYSICAL THERAPIST

## 2020-01-09 NOTE — PROGRESS NOTES
"   Physical Therapy Daily Treatment Note     Name: Sy Aguiar  Clinic Number: [id number]    Therapy Diagnosis:   Encounter Diagnoses   Name Primary?    Acute pain of right knee     Impaired functional mobility, balance, gait, and endurance     Decreased range of motion of right knee      Physician: Urbano Vigil MD    Visit Date: 1/8/2020    Physician Orders: PT Eval and Treat  Medical Diagnosis from Referral: M17.12 (ICD-10-CM) - Primary osteoarthritis of left knee  Evaluation Date: 12/10/2019  Authorization Period Expiration: 1/26/2020  Plan of Care Expiration: 1/26/2020  Visit # / Visits authorized: 15/12  FOTO: 3/5     Visit:  57.78  Total: 413.04     Time In: 1:00 PM  Time Out: 1:55 PM  Total Billable Time: 25 minutes     Precautions: Standard, Right TKA on 12/9/2019    Subjective     Pt reports: walking with no AD, improving each week.    He was compliant with home exercise program.  Response to previous treatment: no adverse effects  Functional change: Ambulation with walking stick only in community    Pain: 0/10  Location: right knee      Objective     Sy received the following manual therapy techniques: Joint mobilizations were applied to the: right knee for 10 minutes, including:  Patellar mobilizations in all directions  ASTYM to anterior and posterior knee  Extension mobilizations to end range.     Sy received therapeutic exercises to develop strength, ROM and flexibility for 45 minutes including objective measurements:    Recumbent Bike: Level 2, 6 minutes, full revolutions for increased ROM  Heel slides: 5x30" holds, last repetition is for 1 minute, two straps + SB  SLR 4x8 2#   Sidelying Clams: 30x R GTB    Fwd Step-ups: x20 with RLE leading, 6" step  Lateral Step-ups: x20 alternating, 6" step  Standing TKE Purple SC 5" hold, 30x   Fitter 30" x 3 R SL     Cybex knee extension 30# up with DL down with SL 3x10   Cybex Leg Press 6.0 3x10 DL   Step downs 4" 4x5    Home Exercises Provided and " Patient Education Provided   Education provided:   - Continue HEP  - Home modalities prn    Written Home Exercises Provided: yes.  Exercises were reviewed and Sy was able to demonstrate them prior to the end of the session.  Sy demonstrated good  understanding of the education provided.     See EMR under Patient Instructions for exercises provided 12/10/2019.    Assessment   Pt able to complete therex and manual treatments with minimal discomfort. Pt reports continued discomfort but improving each week and still using modalities at home. .     Sy is progressing well towards his goals.   Pt prognosis is Excellent.     Pt will continue to benefit from skilled outpatient physical therapy to address the deficits listed in the problem list box on initial evaluation, provide pt/family education and to maximize pt's level of independence in the home and community environment.   Pt's spiritual, cultural and educational needs considered and pt agreeable to plan of care and goals.    Anticipated barriers to physical therapy: High BMI    Goals:   Short Term Goals:     1. Pt will be independent with HEP supplement PT in improving functional mobility. MET 12/30/19  2. Pt will improve RLE strength to at least 75% of LLE strength as measured via MicroFet handheld dynamometer in order to improve functional mobility MET 12/30/19  3. Pt will improve R knee AROM to at least 90 degrees of flexion and -7 degrees of extension in order to improve gait MET 12/30/19     Long Term Goals:  1. Pt will be independent with updated HEP supplement PT in improving functional mobility. (Progressing, not met)  2. Pt will improve R LE strength to at least 90% of L LE strength as measured via MicroFet handheld dynamometer in order to improve functional mobility MET 12/30/19  3. Pt will improve R knee AROM to at least 110 degrees of flexion and 0 degrees of extension in order to improve gait and ability to perform ADLs FLEXION MET 12/30/19,  Extension PROGRESSING  4. Pt will improve FOTO knee survey score to </= 40% limited in order to demo improved functional mobility (Progressing, not met)  5. Pt will improve score on Function,Daily Living section of KOOS to at least 21/68, (30.8% limited) in order to demo improved functional mobility (Progressing, not met)  6. Pt will perform TUG in < 10 seconds without AD in order to demo improved gait speed MET 12/30/19  7. Pt will perform at least 16 sit to stands without UE support on 30 second sit to stand test in order to demo improved ability to perform transfers MET 12/30/19    Plan     Continue POC     Raheem Bajwa, PT

## 2020-01-13 ENCOUNTER — CLINICAL SUPPORT (OUTPATIENT)
Dept: REHABILITATION | Facility: HOSPITAL | Age: 65
End: 2020-01-13
Attending: ORTHOPAEDIC SURGERY
Payer: MEDICARE

## 2020-01-13 DIAGNOSIS — M25.661 DECREASED RANGE OF MOTION OF RIGHT KNEE: ICD-10-CM

## 2020-01-13 DIAGNOSIS — M25.561 ACUTE PAIN OF RIGHT KNEE: ICD-10-CM

## 2020-01-13 DIAGNOSIS — Z74.09 IMPAIRED FUNCTIONAL MOBILITY, BALANCE, GAIT, AND ENDURANCE: ICD-10-CM

## 2020-01-13 PROCEDURE — 97140 MANUAL THERAPY 1/> REGIONS: CPT | Mod: PN | Performed by: PHYSICAL THERAPIST

## 2020-01-13 PROCEDURE — 97110 THERAPEUTIC EXERCISES: CPT | Mod: PN | Performed by: PHYSICAL THERAPIST

## 2020-01-13 NOTE — PROGRESS NOTES
"   Physical Therapy Daily Treatment Note     Name: Sy Aguiar  Clinic Number: [id number]    Therapy Diagnosis:   Encounter Diagnoses   Name Primary?    Acute pain of right knee     Impaired functional mobility, balance, gait, and endurance     Decreased range of motion of right knee      Physician: Urbano Vigil MD    Visit Date: 1/13/2020    Physician Orders: PT Eval and Treat  Medical Diagnosis from Referral: M17.12 (ICD-10-CM) - Primary osteoarthritis of left knee  Evaluation Date: 12/10/2019  Authorization Period Expiration: 1/26/2020  Plan of Care Expiration: 1/26/2020  Visit # / Visits authorized: 16/12  FOTO: 4/5     Visit:  57.78  Total: 470.82     Time In: 1:00 PM  Time Out: 2:05 PM  Total Billable Time: 30 minutes     Precautions: Standard, Right TKA on 12/9/2019    Subjective     Pt reports: increased swelling following day trip out of town but better following activity    He was compliant with home exercise program.  Response to previous treatment: no adverse effects  Functional change: no AD used.  Pain: 0/10  Location: right knee      Objective     Sy received the following manual therapy techniques: Joint mobilizations were applied to the: right knee for 15 minutes, including:  Patellar mobilizations in all directions  ASTYM to anterior and posterior knee  Extension mobilizations to end range.     Sy received therapeutic exercises to develop strength, ROM and flexibility for 45 minutes including:    Recumbent Bike: Level 2, 6 minutes, full revolutions for increased ROM  Heel slides: 5x30" holds, last repetition is for 1 minute, two straps + SB  SLR 4x8 2#   Sidelying Clams: 30x R GTB    Fwd Step-ups: x20 with RLE leading, 6" step  Lateral Step-ups: x20 alternating, 6" step  Standing TKE Purple SC 5" hold, 30x   Fitter 30" x 3 R SL     Cybex knee extension 30# up with DL down with SL 3x10   Cybex Leg Press 6.0 3x10 DL   Step downs 4" 4x5    Home Exercises Provided and Patient Education " Provided   Education provided:   - Continue HEP  - Home modalities prn    Written Home Exercises Provided: yes.  Exercises were reviewed and Sy was able to demonstrate them prior to the end of the session.  Sy demonstrated good  understanding of the education provided.     See EMR under Patient Instructions for exercises provided 12/10/2019.    Assessment   Pt presents with increased swelling and soreness previous day but was able to perform therex without minimal discomfort. Pt did get to 115 degrees of flexion with heel slides. Pt reports some anterior knee pain during therex but no further discomfort noted. .     Sy is progressing well towards his goals.   Pt prognosis is Excellent.     Pt will continue to benefit from skilled outpatient physical therapy to address the deficits listed in the problem list box on initial evaluation, provide pt/family education and to maximize pt's level of independence in the home and community environment.   Pt's spiritual, cultural and educational needs considered and pt agreeable to plan of care and goals.    Anticipated barriers to physical therapy: High BMI    Goals:   Short Term Goals:     1. Pt will be independent with HEP supplement PT in improving functional mobility. MET 12/30/19  2. Pt will improve RLE strength to at least 75% of LLE strength as measured via MicroFet handheld dynamometer in order to improve functional mobility MET 12/30/19  3. Pt will improve R knee AROM to at least 90 degrees of flexion and -7 degrees of extension in order to improve gait MET 12/30/19     Long Term Goals:  1. Pt will be independent with updated HEP supplement PT in improving functional mobility. (Progressing, not met)  2. Pt will improve R LE strength to at least 90% of L LE strength as measured via MicroFet handheld dynamometer in order to improve functional mobility MET 12/30/19  3. Pt will improve R knee AROM to at least 110 degrees of flexion and 0 degrees of extension in  order to improve gait and ability to perform ADLs FLEXION MET 12/30/19, Extension PROGRESSING  4. Pt will improve FOTO knee survey score to </= 40% limited in order to demo improved functional mobility (Progressing, not met)  5. Pt will improve score on Function,Daily Living section of KOOS to at least 21/68, (30.8% limited) in order to demo improved functional mobility (Progressing, not met)  6. Pt will perform TUG in < 10 seconds without AD in order to demo improved gait speed MET 12/30/19  7. Pt will perform at least 16 sit to stands without UE support on 30 second sit to stand test in order to demo improved ability to perform transfers MET 12/30/19    Plan     Continue POC     Raheem Bajwa, PT

## 2020-01-15 ENCOUNTER — CLINICAL SUPPORT (OUTPATIENT)
Dept: REHABILITATION | Facility: HOSPITAL | Age: 65
End: 2020-01-15
Attending: ORTHOPAEDIC SURGERY
Payer: MEDICARE

## 2020-01-15 DIAGNOSIS — M25.661 DECREASED RANGE OF MOTION OF RIGHT KNEE: ICD-10-CM

## 2020-01-15 DIAGNOSIS — M25.561 ACUTE PAIN OF RIGHT KNEE: ICD-10-CM

## 2020-01-15 DIAGNOSIS — Z74.09 IMPAIRED FUNCTIONAL MOBILITY, BALANCE, GAIT, AND ENDURANCE: ICD-10-CM

## 2020-01-15 PROCEDURE — 97140 MANUAL THERAPY 1/> REGIONS: CPT | Mod: PN | Performed by: PHYSICAL THERAPIST

## 2020-01-15 PROCEDURE — 97110 THERAPEUTIC EXERCISES: CPT | Mod: PN | Performed by: PHYSICAL THERAPIST

## 2020-01-15 NOTE — PROGRESS NOTES
"   Physical Therapy Daily Treatment Note     Name: Sy Aguiar  Clinic Number: [id number]    Therapy Diagnosis:   Encounter Diagnoses   Name Primary?    Acute pain of right knee     Impaired functional mobility, balance, gait, and endurance     Decreased range of motion of right knee      Physician: Urbano Vigil MD    Visit Date: 1/15/2020    Physician Orders: PT Eval and Treat  Medical Diagnosis from Referral: M17.12 (ICD-10-CM) - Primary osteoarthritis of left knee  Evaluation Date: 12/10/2019  Authorization Period Expiration: 1/26/2020  Plan of Care Expiration: 1/26/2020  Visit # / Visits authorized: 17/12  FOTO: 5/5 - next     Visit:  57.78  Total: 528.60     Time In: 1:00 PM  Time Out: 2:05 PM  Total Billable Time: 30 minutes     Precautions: Standard, Right TKA on 12/9/2019    Subjective     Pt reports: shaper pains in anterior and lateral knee. Mainly due to increase in walking.   He was compliant with home exercise program.  Response to previous treatment: no adverse effects  Functional change: no AD used.  Pain: 0/10  Location: right knee      Objective     Sy received the following manual therapy techniques: Joint mobilizations were applied to the: right knee for 15 minutes, including:  Patellar mobilizations in all directions  ASTYM to anterior and posterior knee  Extension mobilizations to end range.     Sy received therapeutic exercises to develop strength, ROM and flexibility for 45 minutes including:    Recumbent Bike: Level 2, 6 minutes, full revolutions for increased ROM  Heel slides: 5x30" holds, two straps + SB  SLR 4x8 2#   Sidelying Clams: 30x R GTB    Fwd Step-ups: x30 with RLE leading, 6" step  Lateral Step-ups: x20 alternating, 6" step  Standing TKE Purple SC 5" hold, 30x   Fitter 30" x 3 R SL   Hamstring stretch: 15" x 4 RLE    Cybex knee extension 30# up with DL down with SL 3x10   Cybex Leg Press 6.0 3x10 DL   Step downs 4" 3x8    Home Exercises Provided and Patient Education " Provided   Education provided:   - Continue HEP  - Home modalities prn    Written Home Exercises Provided: yes.  Exercises were reviewed and Sy was able to demonstrate them prior to the end of the session.  Sy demonstrated good  understanding of the education provided.     See EMR under Patient Instructions for exercises provided 12/10/2019.    Assessment   Pt continues to progress up with ROM and swelling. Pt feels much stronger and is ambulating with only a minimally antalgic gait. Pt has continued therex at home and will monitor pain.    Sy is progressing well towards his goals.   Pt prognosis is Excellent.     Pt will continue to benefit from skilled outpatient physical therapy to address the deficits listed in the problem list box on initial evaluation, provide pt/family education and to maximize pt's level of independence in the home and community environment.   Pt's spiritual, cultural and educational needs considered and pt agreeable to plan of care and goals.    Anticipated barriers to physical therapy: High BMI    Goals:   Short Term Goals:     1. Pt will be independent with HEP supplement PT in improving functional mobility. MET 12/30/19  2. Pt will improve RLE strength to at least 75% of LLE strength as measured via MicroFet handheld dynamometer in order to improve functional mobility MET 12/30/19  3. Pt will improve R knee AROM to at least 90 degrees of flexion and -7 degrees of extension in order to improve gait MET 12/30/19     Long Term Goals:  1. Pt will be independent with updated HEP supplement PT in improving functional mobility. (Progressing, not met)  2. Pt will improve R LE strength to at least 90% of L LE strength as measured via MicroFet handheld dynamometer in order to improve functional mobility MET 12/30/19  3. Pt will improve R knee AROM to at least 110 degrees of flexion and 0 degrees of extension in order to improve gait and ability to perform ADLs FLEXION MET 12/30/19,  Extension PROGRESSING  4. Pt will improve FOTO knee survey score to </= 40% limited in order to demo improved functional mobility (Progressing, not met)  5. Pt will improve score on Function,Daily Living section of KOOS to at least 21/68, (30.8% limited) in order to demo improved functional mobility (Progressing, not met)  6. Pt will perform TUG in < 10 seconds without AD in order to demo improved gait speed MET 12/30/19  7. Pt will perform at least 16 sit to stands without UE support on 30 second sit to stand test in order to demo improved ability to perform transfers MET 12/30/19    Plan     Continue POC     Raheem Bajwa, PT

## 2020-01-21 ENCOUNTER — CLINICAL SUPPORT (OUTPATIENT)
Dept: REHABILITATION | Facility: HOSPITAL | Age: 65
End: 2020-01-21
Attending: ORTHOPAEDIC SURGERY
Payer: MEDICARE

## 2020-01-21 DIAGNOSIS — Z74.09 IMPAIRED FUNCTIONAL MOBILITY, BALANCE, GAIT, AND ENDURANCE: ICD-10-CM

## 2020-01-21 DIAGNOSIS — M25.661 DECREASED RANGE OF MOTION OF RIGHT KNEE: ICD-10-CM

## 2020-01-21 DIAGNOSIS — M25.561 ACUTE PAIN OF RIGHT KNEE: ICD-10-CM

## 2020-01-21 PROCEDURE — 97110 THERAPEUTIC EXERCISES: CPT | Mod: PN | Performed by: PHYSICAL THERAPIST

## 2020-01-21 PROCEDURE — 97140 MANUAL THERAPY 1/> REGIONS: CPT | Mod: PN | Performed by: PHYSICAL THERAPIST

## 2020-01-21 NOTE — PROGRESS NOTES
"   Physical Therapy Daily Treatment Note     Name: Sy Aguiar  Clinic Number: [id number]    Therapy Diagnosis:   Encounter Diagnoses   Name Primary?    Acute pain of right knee     Impaired functional mobility, balance, gait, and endurance     Decreased range of motion of right knee      Physician: Urbano Vigil MD    Visit Date: 1/21/2020    Physician Orders: PT Eval and Treat  Medical Diagnosis from Referral: M17.12 (ICD-10-CM) - Primary osteoarthritis of left knee  Evaluation Date: 12/10/2019  Authorization Period Expiration: 1/26/2020  Plan of Care Expiration: 1/26/2020  Visit # / Visits authorized: 18/24  FOTO: done today    Visit:  57.78  Total: 586.38     Time In: 10:00 PM  Time Out: 11:00 PM  Total Billable Time: 30 minutes     Precautions: Standard, Right TKA on 12/9/2019    Subjective     Pt reports: having more pain with walking extended period of time. Pt reports sharper pains along anterior knee down into shin   He was compliant with home exercise program.  Response to previous treatment: no adverse effects  Functional change: no AD used.  Pain: 0/10  Location: right knee      Objective     Sy received the following manual therapy techniques: Joint mobilizations were applied to the: right knee for 15 minutes, including:  Patellar mobilizations in all directions  ASTYM to anterior and posterior knee  Extension mobilizations to end range.     Sy received therapeutic exercises to develop strength, ROM and flexibility for 45 minutes including:    Recumbent Bike: Level 2, 6 minutes, full revolutions for increased ROM  Heel slides: 5x30" holds, two straps + SB  SLR 4x8 2#   Sidelying Clams: 30x R GTB    Fwd Step-ups: x30 with RLE leading, 6" step  Lateral Step-ups: x20 alternating, 6" step  Standing TKE Purple SC 5" hold, 30x   Fitter 30" x 3 R SL   Hamstring stretch: 15" x 4 RLE    Cybex knee extension 30# up with DL down with SL 3x10   Cybex Leg Press 6.0 3x10 DL   Step downs 4" 3x8    Knee " AROM/PROM Right Left Pain/Dysfunction with Movement       Knee Flexion 113/120 110/115     Knee Extension 0/0 0/0     *denotes pain with movement  *NT = not tested     L/E Strength w/ MicroFET Muscle Luz Dynamometer Right Left Pain/Dysfunction with Movement   (approx 4 sec hold w/ max contraction)   Hip Flexion 21.7 kg  22.6 kg      Hip Abduction 21.4 kg  17.6 kg      Quadriceps 17.9 kg  18.9 kg      Hamstrings 23.9 kg  121.6 kg         TU.4 seconds     30 second sit-to-stand test (without U/E support): 10 without support     KOOS Knee Survey:    Symptoms:  Pain:   Functional, Daily Livin (47% disabled)  Function, Sports and Recreational Activities:   Quality of Life:      PROMIS-29:    Physical Function:    Anxiety:    Depression:     Fatigue:     Sleep Disturbance:     Satisfaction with Social Role:     Pain Interference:     Pain Intensity:  8/10        CMS Impairment/Limitation/Restriction for KOOS, Functional, Daily Living     Therapist reviewed KOOS scores for Sy Aguiar on 2020.   KOOS documents entered into [employer] - see Media section.     Limitation Score: 47%  Category: Mobility     Current : CK = at least 40% but < 60% impaired, limited or restricted  Goal: CK = at least 40% but < 60% impaired, limited or restricted     Limitation for FOTO Knee Survey  Limitation Score: 61ed%           Home Exercises Provided and Patient Education Provided   Education provided:   - Continue HEP  - Home modalities prn    Written Home Exercises Provided: yes.  Exercises were reviewed and Sy was able to demonstrate them prior to the end of the session.  Sy demonstrated good  understanding of the education provided.     See EMR under Patient Instructions for exercises provided 12/10/2019.    Assessment   Pt presents with pain along shin on right side with numbness and tingling at times but no further discomfort. Pt walks with antalgic gait at  times due to discomfort and pain. Pt will continue strengthening quadriceps and lateral hip to further strengthen RLE to decrease antalgic gait.     Sy is progressing well towards his goals.   Pt prognosis is Excellent.     Pt will continue to benefit from skilled outpatient physical therapy to address the deficits listed in the problem list box on initial evaluation, provide pt/family education and to maximize pt's level of independence in the home and community environment.   Pt's spiritual, cultural and educational needs considered and pt agreeable to plan of care and goals.    Anticipated barriers to physical therapy: High BMI    Goals:   Short Term Goals:     1. Pt will be independent with HEP supplement PT in improving functional mobility. MET 12/30/19  2. Pt will improve RLE strength to at least 75% of LLE strength as measured via MicroFet handheld dynamometer in order to improve functional mobility MET 12/30/19  3. Pt will improve R knee AROM to at least 90 degrees of flexion and -7 degrees of extension in order to improve gait MET 12/30/19     Long Term Goals:  1. Pt will be independent with updated HEP supplement PT in improving functional mobility. MET  2. Pt will improve R LE strength to at least 90% of L LE strength as measured via MicroFet handheld dynamometer in order to improve functional mobility MET 12/30/19  3. Pt will improve R knee AROM to at least 110 degrees of flexion and 0 degrees of extension in order to improve gait and ability to perform ADLs FLEXION MET 12/30/19, Extension PROGRESSING  4. Pt will improve FOTO knee survey score to </= 40% limited in order to demo improved functional mobility (Progressing, not met)  5. Pt will improve score on Function,Daily Living section of KOOS to at least 21/68, (30.8% limited) in order to demo improved functional mobility (Progressing, not met)  6. Pt will perform TUG in < 10 seconds without AD in order to demo improved gait speed MET 12/30/19  7.  Pt will perform at least 16 sit to stands without UE support on 30 second sit to stand test in order to demo improved ability to perform transfers MET 12/30/19    Plan     Update POC today    Raheem Bajwa, PT

## 2020-01-23 NOTE — PLAN OF CARE
Outpatient Therapy Updated Plan of Care     Visit Date: 2020  Name: Sy Aguiar  Clinic Number: [id number]    Therapy Diagnosis:   Encounter Diagnoses   Name Primary?    Acute pain of right knee     Impaired functional mobility, balance, gait, and endurance     Decreased range of motion of right knee      Physician: Urbano Vigil MD    Physician Orders: PT Eval and Treat  Medical Diagnosis from Referral: M17.12 (ICD-10-CM) - Primary osteoarthritis of left knee  Evaluation Date: 12/10/2019    Total Visits Received: 18      Current Certification Period:  12/10/2020 to 2020  Precautions:  Standard, Right TKA on 2019  Visits from Evaluation Date:  18  Functional Level Prior to Evaluation:  Unable to walk due to pain and weakness from surgery.    Subjective     Update:   Pt reports: having more pain with walking extended period of time. Pt reports sharper pains along anterior knee down into shin   He was compliant with home exercise program.  Response to previous treatment: no adverse effects  Functional change: no AD used.  Pain: 0/10  Location: right knee     Objective     Update:    Knee AROM/PROM Right Left Pain/Dysfunction with Movement       Knee Flexion 113/120 110/115     Knee Extension 0/0 0/0          L/E Strength w/ MicroFET Muscle Luz Dynamometer Right Left Pain/Dysfunction with Movement   (approx 4 sec hold w/ max contraction)   Hip Flexion 21.7 kg  22.6 kg      Hip Abduction 21.4 kg  17.6 kg      Quadriceps 17.9 kg  18.9 kg      Hamstrings 23.9 kg  121.6 kg         TU.4 seconds     30 second sit-to-stand test (without U/E support): 10 without support     KOOS Knee Survey:    Symptoms:  Pain: /36  Functional, Daily Livin/68 (47% disabled)  Function, Sports and Recreational Activities:   Quality of Life:      PROMIS-29:    Physical Function:    Anxiety:    Depression:     Fatigue:     Sleep Disturbance:     Satisfaction with Social  Role:  16/20   Pain Interference:  13/20   Pain Intensity:  8/10        CMS Impairment/Limitation/Restriction for KOOS, Functional, Daily Living     Therapist reviewed KOOS scores for Sy Aguiar on 1/21/2020.   KOOS documents entered into [employer] - see Media section.     Limitation Score: 47%  Category: Mobility     Current : CK = at least 40% but < 60% impaired, limited or restricted  Goal: CK = at least 40% but < 60% impaired, limited or restricted     Limitation for FOTO Knee Survey  Limitation Score: 61%         Assessment     Update:   Pt presents with pain along shin on right side with numbness and tingling at times but no further discomfort. Pt walks with antalgic gait at times due to discomfort and pain. Pt will continue strengthening quadriceps and lateral hip to further strengthen RLE to decrease antalgic gait.      Sy is progressing well towards his goals.   Pt prognosis is Excellent.      Pt will continue to benefit from skilled outpatient physical therapy to address the deficits listed in the problem list box on initial evaluation, provide pt/family education and to maximize pt's level of independence in the home and community environment.   Pt's spiritual, cultural and educational needs considered and pt agreeable to plan of care and goals.     Anticipated barriers to physical therapy: High BMI    Previous Short Term Goals Status:   Met  New Short Term Goals Status:   Met  Long Term Goal Status:   continue per initial plan of care.  Reasons for Recertification of Therapy:   To decrease pain with ambulation, improve quadriceps strengthening.    Plan     Updated Certification Period: 1/21/2020 to 2/18/2020  Recommended Treatment Plan: 1 times per week for 4 weeks: Gait Training, Manual Therapy, Moist Heat/ Ice, Neuromuscular Re-ed, Patient Education, Self Care, Therapeutic Activites and Therapeutic Exercise  Other Recommendations: EARNESTINE Bajwa, PT  1/21/2020      I CERTIFY THE NEED FOR  THESE SERVICES FURNISHED UNDER THIS PLAN OF TREATMENT AND WHILE UNDER MY CARE    Physician's comments:        Physician's Signature: ___________________________________________________

## 2020-01-28 ENCOUNTER — CLINICAL SUPPORT (OUTPATIENT)
Dept: REHABILITATION | Facility: HOSPITAL | Age: 65
End: 2020-01-28
Attending: ORTHOPAEDIC SURGERY
Payer: MEDICARE

## 2020-01-28 DIAGNOSIS — Z74.09 IMPAIRED FUNCTIONAL MOBILITY, BALANCE, GAIT, AND ENDURANCE: ICD-10-CM

## 2020-01-28 DIAGNOSIS — M25.561 ACUTE PAIN OF RIGHT KNEE: ICD-10-CM

## 2020-01-28 DIAGNOSIS — M25.661 DECREASED RANGE OF MOTION OF RIGHT KNEE: ICD-10-CM

## 2020-01-28 PROCEDURE — 97110 THERAPEUTIC EXERCISES: CPT | Mod: PN | Performed by: PHYSICAL THERAPIST

## 2020-01-28 NOTE — PROGRESS NOTES
"   Physical Therapy Daily Treatment Note     Name: Sy Aguiar  Clinic Number: [id number]    Therapy Diagnosis:   Encounter Diagnoses   Name Primary?    Acute pain of right knee     Impaired functional mobility, balance, gait, and endurance     Decreased range of motion of right knee      Physician: Urbano Vigil MD    Visit Date: 1/28/2020    Physician Orders: PT Eval and Treat  Medical Diagnosis from Referral: M17.12 (ICD-10-CM) - Primary osteoarthritis of left knee  Evaluation Date: 12/10/2019  Authorization Period Expiration: 1/26/2020  Plan of Care Expiration: 2/16/2020  Visit # / Visits authorized: 19/24  FOTO: done today    Visit:  60.64  Total: 647.16     Time In: 10:00 PM  Time Out: 11:00 PM  Total Billable Time: 30 minutes     Precautions: Standard, Right TKA on 12/9/2019    Subjective     Pt reports: stiffness and discomfort today, flew over weekend and had increased discomfort   He was compliant with home exercise program.  Response to previous treatment: no adverse effects  Functional change: no AD used.  Pain: 0/10  Location: right knee      Objective     Sy received the following manual therapy techniques: Joint mobilizations were applied to the: right knee for 0 minutes, including:  Patellar mobilizations in all directions  ASTYM to anterior and posterior knee  Extension mobilizations to end range.     Sy received therapeutic exercises to develop strength, ROM and flexibility for 60 minutes including assessment.:    Recumbent Bike: Level 2, 6 minutes, full revolutions for increased ROM  Heel slides: 5x30" holds, two straps + SB  SLR 4x8 2#   Sidelying Clams: 30x R GTB    Fwd Step-ups: x30 with RLE leading, 6" step  Lateral Step-ups: x20 alternating, 6" step  Standing TKE Purple SC 5" hold, 30x   Fitter 30" x 3 R SL   Hamstring stretch: 15" x 4 RLE    Cybex knee extension 30# up with DL down with SL 3x10    Cybex Leg Press 6.5 3x10 DL   Step downs 4" 3x8    Knee AROM/PROM Right Left " Pain/Dysfunction with Movement       Knee Flexion 100/120 110/115     Knee Extension 0/0 0/0     *denotes pain with movement  *NT = not tested     L/E Strength w/ MicroFET Muscle Luz Dynamometer Right Left Pain/Dysfunction with Movement   (approx 4 sec hold w/ max contraction)   Hip Flexion 23.5 kg  21.8 kg      Hip Abduction 22.9 kg  23.8 kg      Quadriceps 21.3 kg  23.3 kg      Hamstrings 22.2 kg  22.2 kg         TU.53 seconds     30 second sit-to-stand test (without U/E support): 14 without support            Home Exercises Provided and Patient Education Provided   Education provided:   - Continue HEP  - Home modalities prn    Written Home Exercises Provided: yes.  Exercises were reviewed and Sy was able to demonstrate them prior to the end of the session.  Sy demonstrated good  understanding of the education provided.     See EMR under Patient Instructions for exercises provided 12/10/2019.    Assessment   Pt. Presents with minimally antalgic gait along right knee due flying previous weekend. Pt reports continued improvement in strength and ROM but has sharp pains during certain movements.      Sy is progressing well towards his goals.   Pt prognosis is Excellent.     Pt will continue to benefit from skilled outpatient physical therapy to address the deficits listed in the problem list box on initial evaluation, provide pt/family education and to maximize pt's level of independence in the home and community environment.   Pt's spiritual, cultural and educational needs considered and pt agreeable to plan of care and goals.    Anticipated barriers to physical therapy: High BMI    Goals:   Short Term Goals:     1. Pt will be independent with HEP supplement PT in improving functional mobility. MET 19  2. Pt will improve RLE strength to at least 75% of LLE strength as measured via MicroFet handheld dynamometer in order to improve functional mobility MET 19  3. Pt will improve R knee AROM  to at least 90 degrees of flexion and -7 degrees of extension in order to improve gait MET 12/30/19     Long Term Goals:  1. Pt will be independent with updated HEP supplement PT in improving functional mobility. MET  2. Pt will improve R LE strength to at least 90% of L LE strength as measured via MicroFet handheld dynamometer in order to improve functional mobility MET 12/30/19  3. Pt will improve R knee AROM to at least 110 degrees of flexion and 0 degrees of extension in order to improve gait and ability to perform ADLs FLEXION MET 12/30/19, Extension PROGRESSING  4. Pt will improve FOTO knee survey score to </= 40% limited in order to demo improved functional mobility (Progressing, not met)  5. Pt will improve score on Function,Daily Living section of KOOS to at least 21/68, (30.8% limited) in order to demo improved functional mobility (Progressing, not met)  6. Pt will perform TUG in < 10 seconds without AD in order to demo improved gait speed MET 12/30/19  7. Pt will perform at least 16 sit to stands without UE support on 30 second sit to stand test in order to demo improved ability to perform transfers MET 12/30/19    Plan     Continue POC to advance toward functional goals    Raheem Bajwa, PT

## 2020-01-29 ENCOUNTER — CLINICAL SUPPORT (OUTPATIENT)
Dept: REHABILITATION | Facility: HOSPITAL | Age: 65
End: 2020-01-29
Attending: ORTHOPAEDIC SURGERY
Payer: MEDICARE

## 2020-01-29 DIAGNOSIS — Z74.09 IMPAIRED FUNCTIONAL MOBILITY, BALANCE, GAIT, AND ENDURANCE: ICD-10-CM

## 2020-01-29 DIAGNOSIS — M25.661 DECREASED RANGE OF MOTION OF RIGHT KNEE: ICD-10-CM

## 2020-01-29 DIAGNOSIS — M25.561 ACUTE PAIN OF RIGHT KNEE: ICD-10-CM

## 2020-01-29 PROCEDURE — 97140 MANUAL THERAPY 1/> REGIONS: CPT | Mod: PN | Performed by: PHYSICAL THERAPIST

## 2020-01-29 PROCEDURE — 97110 THERAPEUTIC EXERCISES: CPT | Mod: PN | Performed by: PHYSICAL THERAPIST

## 2020-01-29 NOTE — PROGRESS NOTES
"   Physical Therapy Daily Treatment Note     Name: Sy Aguiar  Clinic Number: [id number]    Therapy Diagnosis:   Encounter Diagnoses   Name Primary?    Acute pain of right knee     Impaired functional mobility, balance, gait, and endurance     Decreased range of motion of right knee      Physician: Urbano Vigil MD    Visit Date: 1/29/2020    Physician Orders: PT Eval and Treat  Medical Diagnosis from Referral: M17.12 (ICD-10-CM) - Primary osteoarthritis of left knee  Evaluation Date: 12/10/2019  Authorization Period Expiration: 1/26/2020  Plan of Care Expiration: 2/16/2020  Visit # / Visits authorized: 20/24  FOTO: done today    Visit:  118.42  Total: 765.58     Time In: 12:05 PM  Time Out: 1:00 PM  Total Billable Time: 55 minutes     Precautions: Standard, Right TKA on 12/9/2019    Subjective     Pt reports: his knee is feeling good and he is moving well today   He was compliant with home exercise program.  Response to previous treatment: no adverse effects  Functional change: no AD used.   Pain: 0/10  Location: right knee      Objective     Sy received the following manual therapy techniques: Joint mobilizations were applied to the: right knee for 10 minutes, including:  Patellar mobilizations in all directions  ASTYM to anterior and posterior knee  Extension mobilizations to end range.     Sy received therapeutic exercises to develop strength, ROM and flexibility for 50 minutes including:    Recumbent Bike: Level 2, 6 minutes, full revolutions for increased ROM  Heel slides: 5x30" holds, two straps + SB  SLR 4x8 2#   Sidelying Clams: 30x R GTB    Fwd Step-ups: x30 with RLE leading, 6" step  Lateral Step-ups: x20, 6" step  Standing TKE Purple SC 5" hold, 30x   Fitter 30" x 3 R SL   Hamstring stretch: 15" x 4 RLE    Cybex knee extension 30# up with DL down with SL 3x10    Cybex Leg Press 7 3x10 DL   Step downs 4" 3x8     Sidelying hip abduction 3x8     R Knee Flexion ROM: 113 deg      Home Exercises " Provided and Patient Education Provided   Education provided:   - Continue HEP  - Home modalities prn    Written Home Exercises Provided: yes.  Exercises were reviewed and Sy was able to demonstrate them prior to the end of the session.  Sy demonstrated good  understanding of the education provided.     See EMR under Patient Instructions for exercises provided 12/10/2019.    Assessment   Pt presents with improved flexion ROM today due to decrease in stiffness and swelling. Pt reports improved walking today and will look to come back following week.       Sy is progressing well towards his goals.   Pt prognosis is Excellent.     Pt will continue to benefit from skilled outpatient physical therapy to address the deficits listed in the problem list box on initial evaluation, provide pt/family education and to maximize pt's level of independence in the home and community environment.   Pt's spiritual, cultural and educational needs considered and pt agreeable to plan of care and goals.    Anticipated barriers to physical therapy: High BMI    Goals:   Short Term Goals:     1. Pt will be independent with HEP supplement PT in improving functional mobility. MET 12/30/19  2. Pt will improve RLE strength to at least 75% of LLE strength as measured via MicroFet handheld dynamometer in order to improve functional mobility MET 12/30/19  3. Pt will improve R knee AROM to at least 90 degrees of flexion and -7 degrees of extension in order to improve gait MET 12/30/19     Long Term Goals:  1. Pt will be independent with updated HEP supplement PT in improving functional mobility. MET  2. Pt will improve R LE strength to at least 90% of L LE strength as measured via MicroFet handheld dynamometer in order to improve functional mobility MET 12/30/19  3. Pt will improve R knee AROM to at least 110 degrees of flexion and 0 degrees of extension in order to improve gait and ability to perform ADLs FLEXION MET 12/30/19, Extension  PROGRESSING  4. Pt will improve FOTO knee survey score to </= 40% limited in order to demo improved functional mobility (Progressing, not met)  5. Pt will improve score on Function,Daily Living section of KOOS to at least 21/68, (30.8% limited) in order to demo improved functional mobility (Progressing, not met)  6. Pt will perform TUG in < 10 seconds without AD in order to demo improved gait speed MET 12/30/19  7. Pt will perform at least 16 sit to stands without UE support on 30 second sit to stand test in order to demo improved ability to perform transfers MET 12/30/19    Plan     Continue POC to advance toward functional goals    Raheem Bajwa PT

## 2020-02-04 ENCOUNTER — CLINICAL SUPPORT (OUTPATIENT)
Dept: REHABILITATION | Facility: HOSPITAL | Age: 65
End: 2020-02-04
Attending: ORTHOPAEDIC SURGERY
Payer: MEDICARE

## 2020-02-04 DIAGNOSIS — Z74.09 IMPAIRED FUNCTIONAL MOBILITY, BALANCE, GAIT, AND ENDURANCE: ICD-10-CM

## 2020-02-04 DIAGNOSIS — M25.661 DECREASED RANGE OF MOTION OF RIGHT KNEE: ICD-10-CM

## 2020-02-04 DIAGNOSIS — M25.561 ACUTE PAIN OF RIGHT KNEE: ICD-10-CM

## 2020-02-04 PROCEDURE — 97110 THERAPEUTIC EXERCISES: CPT | Mod: PN,CQ

## 2020-02-04 PROCEDURE — 97140 MANUAL THERAPY 1/> REGIONS: CPT | Mod: PN,CQ

## 2020-02-04 NOTE — PROGRESS NOTES
"   Physical Therapy Daily Treatment Note     Name: Sy Aguiar  Clinic Number: [id number]    Therapy Diagnosis:   Encounter Diagnoses   Name Primary?    Acute pain of right knee     Impaired functional mobility, balance, gait, and endurance     Decreased range of motion of right knee      Physician: Urbano Vigil MD    Visit Date: 2/4/2020    Physician Orders: PT Eval and Treat  Medical Diagnosis from Referral: M17.12 (ICD-10-CM) - Primary osteoarthritis of left knee  Evaluation Date: 12/10/2019  Authorization Period Expiration: 1/26/2020  Plan of Care Expiration: 2/16/2020  Visit # / Visits authorized: 21/24  FOTO: done today    Visit:  88.10  Total: 853.68     Time In: 8:45 AM  Time Out: 9:30 AM  Total Billable Time: 45 minutes  (2TE, 1 MT)    Precautions: Standard, Right TKA on 12/9/2019    Subjective     Pt reports: "I've been on the road I feel like the knee swells a little more often". Pt agreeable to PT session.    He was compliant with home exercise program.  Response to previous treatment: minimal R knee soreness  Functional change: no AD used.   Pain: 0/10  Location: right knee      Objective     Sy received the following manual therapy techniques: Joint mobilizations were applied to the: right knee for 10 minutes, including:  Patellar mobilizations in all directions  IASTM (Hawk ) to anterior and posterior knee  Extension mobilizations to end range.     Sy received therapeutic exercises to develop strength, ROM and flexibility for 35 minutes including:    -Recumbent Bike: Level 2, 6 minutes, full revolutions for increased ROM  -Heel slides: 5x30" holds, two straps + SB  -SLR 4x8 2#   -Sidelying Clams: 30x R GTB    -Fwd Step-ups: x30 with RLE leading, 6" step  -Lateral Step-ups: x20, 6" step  -Standing TKE Purple SC 5" hold, 30x   -Fitter 30" x 3 R SL   -Hamstring stretch: 15" x 4 RLE    -Cybex knee extension 30# up with DL down with SL 3x10    -Cybex Leg Press 7 3x10 DL   -Step downs 4" " 3x8     -Sidelying hip abduction 3x8         Home Exercises Provided and Patient Education Provided   Education provided:   - Continue HEP  - use of ice pack as needed    Written Home Exercises Provided: yes.  Exercises were reviewed and Sy was able to demonstrate them prior to the end of the session.  Sy demonstrated good  understanding of the education provided.     See EMR under Patient Instructions for exercises provided 12/10/2019.    Assessment   Pt tolerated session with no reports of increased pain in R knee post session. Pt cooperative throughout session, no adverse reactions reported post session      Sy is progressing well towards his goals.   Pt prognosis is Excellent.     Pt will continue to benefit from skilled outpatient physical therapy to address the deficits listed in the problem list box on initial evaluation, provide pt/family education and to maximize pt's level of independence in the home and community environment.   Pt's spiritual, cultural and educational needs considered and pt agreeable to plan of care and goals.    Anticipated barriers to physical therapy: High BMI    Goals:   Short Term Goals:     1. Pt will be independent with HEP supplement PT in improving functional mobility. MET 12/30/19  2. Pt will improve RLE strength to at least 75% of LLE strength as measured via MicroFet handheld dynamometer in order to improve functional mobility MET 12/30/19  3. Pt will improve R knee AROM to at least 90 degrees of flexion and -7 degrees of extension in order to improve gait MET 12/30/19     Long Term Goals:  1. Pt will be independent with updated HEP supplement PT in improving functional mobility. MET  2. Pt will improve R LE strength to at least 90% of L LE strength as measured via MicroFet handheld dynamometer in order to improve functional mobility MET 12/30/19  3. Pt will improve R knee AROM to at least 110 degrees of flexion and 0 degrees of extension in order to improve gait and  ability to perform ADLs FLEXION MET 12/30/19, Extension PROGRESSING  4. Pt will improve FOTO knee survey score to </= 40% limited in order to demo improved functional mobility (Progressing, not met)  5. Pt will improve score on Function,Daily Living section of KOOS to at least 21/68, (30.8% limited) in order to demo improved functional mobility (Progressing, not met)  6. Pt will perform TUG in < 10 seconds without AD in order to demo improved gait speed MET 12/30/19  7. Pt will perform at least 16 sit to stands without UE support on 30 second sit to stand test in order to demo improved ability to perform transfers MET 12/30/19    Plan     Continue PT as per POC to advance toward functional goals    Jeffrey Moe PTA

## 2020-02-11 ENCOUNTER — CLINICAL SUPPORT (OUTPATIENT)
Dept: REHABILITATION | Facility: HOSPITAL | Age: 65
End: 2020-02-11
Attending: ORTHOPAEDIC SURGERY
Payer: MEDICARE

## 2020-02-11 DIAGNOSIS — M25.661 DECREASED RANGE OF MOTION OF RIGHT KNEE: ICD-10-CM

## 2020-02-11 DIAGNOSIS — M25.561 ACUTE PAIN OF RIGHT KNEE: ICD-10-CM

## 2020-02-11 DIAGNOSIS — Z74.09 IMPAIRED FUNCTIONAL MOBILITY, BALANCE, GAIT, AND ENDURANCE: ICD-10-CM

## 2020-02-11 PROCEDURE — 97110 THERAPEUTIC EXERCISES: CPT | Mod: PN | Performed by: PHYSICAL THERAPIST

## 2020-02-11 PROCEDURE — 97140 MANUAL THERAPY 1/> REGIONS: CPT | Mod: PN | Performed by: PHYSICAL THERAPIST

## 2020-02-24 PROBLEM — M25.561 ACUTE PAIN OF RIGHT KNEE: Status: RESOLVED | Noted: 2019-12-10 | Resolved: 2020-02-24

## 2020-02-24 PROBLEM — Z74.09 IMPAIRED FUNCTIONAL MOBILITY, BALANCE, GAIT, AND ENDURANCE: Status: RESOLVED | Noted: 2019-12-10 | Resolved: 2020-02-24

## 2020-02-24 PROBLEM — M25.661 DECREASED RANGE OF MOTION OF RIGHT KNEE: Status: RESOLVED | Noted: 2019-12-10 | Resolved: 2020-02-24

## 2020-03-23 ENCOUNTER — DOCUMENTATION ONLY (OUTPATIENT)
Dept: REHABILITATION | Facility: HOSPITAL | Age: 65
End: 2020-03-23

## 2020-03-23 NOTE — PROGRESS NOTES
Outpatient Therapy Discharge Summary     Name: Sy Aguiar  [facility] Number: [id number]    Therapy Diagnosis: No diagnosis found.  Physician: Urbano Vigil MD    Physician Orders: PT Eval and Treat  Medical Diagnosis from Referral: M17.12 (ICD-10-CM) - Primary osteoarthritis of left knee  Evaluation Date: 12/10/2019    Date of Last visit: 2/11/2020  Total Visits Received: 22    Assessment    Goals:   Short Term Goals:     1. Pt will be independent with HEP supplement PT in improving functional mobility. MET 12/30/19  2. Pt will improve RLE strength to at least 75% of LLE strength as measured via MicroFet handheld dynamometer in order to improve functional mobility MET 12/30/19  3. Pt will improve R knee AROM to at least 90 degrees of flexion and -7 degrees of extension in order to improve gait MET 12/30/19     Long Term Goals:  1. Pt will be independent with updated HEP supplement PT in improving functional mobility. MET  2. Pt will improve R LE strength to at least 90% of L LE strength as measured via MicroFet handheld dynamometer in order to improve functional mobility MET 12/30/19  3. Pt will improve R knee AROM to at least 110 degrees of flexion and 0 degrees of extension in order to improve gait and ability to perform ADLs FLEXION MET 12/30/19, Extension NOT MET  4. Pt will improve FOTO knee survey score to </= 40% limited in order to demo improved functional mobility NOT MET  5. Pt will improve score on Function,Daily Living section of KOOS to at least 21/68, (30.8% limited) in order to demo improved functional mobility NOT MET  6. Pt will perform TUG in < 10 seconds without AD in order to demo improved gait speed MET 12/30/19  7. Pt will perform at least 16 sit to stands without UE support on 30 second sit to stand test in order to demo improved ability to perform transfers MET 12/30/19    Discharge reason: Patient has not attended therapy since 2/11/2020    Plan   This patient is discharged from Physical  Therapy

## 2020-06-18 ENCOUNTER — HOSPITAL ENCOUNTER (OUTPATIENT)
Dept: RADIOLOGY | Facility: HOSPITAL | Age: 65
Discharge: HOME | End: 2020-06-18
Attending: ORTHOPAEDIC SURGERY
Payer: MEDICARE

## 2020-06-18 DIAGNOSIS — M17.11 PRIMARY OSTEOARTHRITIS OF RIGHT KNEE: ICD-10-CM

## 2020-06-18 DIAGNOSIS — Z96.651 AFTERCARE FOLLOWING RIGHT KNEE JOINT REPLACEMENT SURGERY: ICD-10-CM

## 2020-06-18 DIAGNOSIS — Z47.1 AFTERCARE FOLLOWING RIGHT KNEE JOINT REPLACEMENT SURGERY: ICD-10-CM

## 2020-06-18 PROCEDURE — 77073 BONE LENGTH STUDIES: CPT | Mod: 26,,, | Performed by: RADIOLOGY

## 2020-06-18 PROCEDURE — 73562 X-RAY EXAM OF KNEE 3: CPT | Mod: 26,59,RT, | Performed by: RADIOLOGY

## 2020-06-18 PROCEDURE — 77073 BONE LENGTH STUDIES: CPT | Mod: TC,FY

## 2020-06-18 PROCEDURE — 77073 XR HIP TO ANKLE: ICD-10-PCS | Mod: 26,,, | Performed by: RADIOLOGY

## 2020-06-18 PROCEDURE — 73562 PR  X-RAY KNEE 3 VIEW: ICD-10-PCS | Mod: 26,59,LT, | Performed by: RADIOLOGY

## 2020-06-18 PROCEDURE — 73562 X-RAY EXAM OF KNEE 3: CPT | Mod: 26,59,LT, | Performed by: RADIOLOGY

## 2020-06-18 PROCEDURE — 73562 X-RAY EXAM OF KNEE 3: CPT | Mod: TC,50,FY

## 2020-10-05 ENCOUNTER — HOSPITAL ENCOUNTER (OUTPATIENT)
Dept: RADIOLOGY | Facility: HOSPITAL | Age: 65
Discharge: HOME | End: 2020-10-05
Attending: ORTHOPAEDIC SURGERY
Payer: MEDICARE

## 2020-10-05 DIAGNOSIS — Z96.651 AFTERCARE FOLLOWING RIGHT KNEE JOINT REPLACEMENT SURGERY: ICD-10-CM

## 2020-10-05 DIAGNOSIS — M17.11 PRIMARY OSTEOARTHRITIS OF RIGHT KNEE: ICD-10-CM

## 2020-10-05 DIAGNOSIS — Z47.1 AFTERCARE FOLLOWING RIGHT KNEE JOINT REPLACEMENT SURGERY: ICD-10-CM

## 2020-10-05 DIAGNOSIS — M19.90 OSTEOARTHRITIS: ICD-10-CM

## 2020-10-05 PROCEDURE — 73562 X-RAY EXAM OF KNEE 3: CPT | Mod: TC,FY,LT

## 2020-10-05 PROCEDURE — 73562 X-RAY EXAM OF KNEE 3: CPT | Mod: 26,LT,, | Performed by: RADIOLOGY

## 2020-10-05 PROCEDURE — 73562 XR KNEE 3 VIEW LEFT: ICD-10-PCS | Mod: 26,LT,, | Performed by: RADIOLOGY

## 2020-11-13 ENCOUNTER — LAB VISIT (OUTPATIENT)
Dept: LAB | Facility: OTHER | Age: 65
End: 2020-11-13
Payer: MEDICARE

## 2020-11-13 DIAGNOSIS — Z03.818 ENCOUNTER FOR OBSERVATION FOR SUSPECTED EXPOSURE TO OTHER BIOLOGICAL AGENTS RULED OUT: ICD-10-CM

## 2020-11-13 PROCEDURE — U0003 INFECTIOUS AGENT DETECTION BY NUCLEIC ACID (DNA OR RNA); SEVERE ACUTE RESPIRATORY SYNDROME CORONAVIRUS 2 (SARS-COV-2) (CORONAVIRUS DISEASE [COVID-19]), AMPLIFIED PROBE TECHNIQUE, MAKING USE OF HIGH THROUGHPUT TECHNOLOGIES AS DESCRIBED BY CMS-2020-01-R: HCPCS

## 2020-11-15 LAB — SARS-COV-2 RNA RESP QL NAA+PROBE: NOT DETECTED

## 2022-02-03 ENCOUNTER — OFFICE VISIT (OUTPATIENT)
Dept: URGENT CARE | Facility: CLINIC | Age: 67
End: 2022-02-03
Payer: MEDICARE

## 2022-02-03 VITALS
HEART RATE: 63 BPM | BODY MASS INDEX: 39.76 KG/M2 | SYSTOLIC BLOOD PRESSURE: 138 MMHG | HEIGHT: 73 IN | OXYGEN SATURATION: 95 % | RESPIRATION RATE: 16 BRPM | DIASTOLIC BLOOD PRESSURE: 80 MMHG | WEIGHT: 300 LBS | TEMPERATURE: 99 F

## 2022-02-03 DIAGNOSIS — J40 SINOBRONCHITIS: Primary | ICD-10-CM

## 2022-02-03 DIAGNOSIS — R05.9 COUGH: ICD-10-CM

## 2022-02-03 DIAGNOSIS — J32.9 SINOBRONCHITIS: Primary | ICD-10-CM

## 2022-02-03 PROBLEM — M70.21 OLECRANON BURSITIS OF RIGHT ELBOW: Status: ACTIVE | Noted: 2018-04-16

## 2022-02-03 LAB
CTP QC/QA: YES
SARS-COV-2 RDRP RESP QL NAA+PROBE: NEGATIVE

## 2022-02-03 PROCEDURE — 3008F BODY MASS INDEX DOCD: CPT | Mod: CPTII,S$GLB,, | Performed by: NURSE PRACTITIONER

## 2022-02-03 PROCEDURE — 1159F MED LIST DOCD IN RCRD: CPT | Mod: CPTII,S$GLB,, | Performed by: NURSE PRACTITIONER

## 2022-02-03 PROCEDURE — 3075F SYST BP GE 130 - 139MM HG: CPT | Mod: CPTII,S$GLB,, | Performed by: NURSE PRACTITIONER

## 2022-02-03 PROCEDURE — 1160F RVW MEDS BY RX/DR IN RCRD: CPT | Mod: CPTII,S$GLB,, | Performed by: NURSE PRACTITIONER

## 2022-02-03 PROCEDURE — 3079F DIAST BP 80-89 MM HG: CPT | Mod: CPTII,S$GLB,, | Performed by: NURSE PRACTITIONER

## 2022-02-03 PROCEDURE — 99213 PR OFFICE/OUTPT VISIT, EST, LEVL III, 20-29 MIN: ICD-10-PCS | Mod: S$GLB,,, | Performed by: NURSE PRACTITIONER

## 2022-02-03 PROCEDURE — 4010F PR ACE/ARB THEARPY RXD/TAKEN: ICD-10-PCS | Mod: CPTII,S$GLB,, | Performed by: NURSE PRACTITIONER

## 2022-02-03 PROCEDURE — 1159F PR MEDICATION LIST DOCUMENTED IN MEDICAL RECORD: ICD-10-PCS | Mod: CPTII,S$GLB,, | Performed by: NURSE PRACTITIONER

## 2022-02-03 PROCEDURE — 4010F ACE/ARB THERAPY RXD/TAKEN: CPT | Mod: CPTII,S$GLB,, | Performed by: NURSE PRACTITIONER

## 2022-02-03 PROCEDURE — 3079F PR MOST RECENT DIASTOLIC BLOOD PRESSURE 80-89 MM HG: ICD-10-PCS | Mod: CPTII,S$GLB,, | Performed by: NURSE PRACTITIONER

## 2022-02-03 PROCEDURE — 1160F PR REVIEW ALL MEDS BY PRESCRIBER/CLIN PHARMACIST DOCUMENTED: ICD-10-PCS | Mod: CPTII,S$GLB,, | Performed by: NURSE PRACTITIONER

## 2022-02-03 PROCEDURE — 3075F PR MOST RECENT SYSTOLIC BLOOD PRESS GE 130-139MM HG: ICD-10-PCS | Mod: CPTII,S$GLB,, | Performed by: NURSE PRACTITIONER

## 2022-02-03 PROCEDURE — 3008F PR BODY MASS INDEX (BMI) DOCUMENTED: ICD-10-PCS | Mod: CPTII,S$GLB,, | Performed by: NURSE PRACTITIONER

## 2022-02-03 PROCEDURE — U0002 COVID-19 LAB TEST NON-CDC: HCPCS | Mod: QW,S$GLB,, | Performed by: NURSE PRACTITIONER

## 2022-02-03 PROCEDURE — U0002: ICD-10-PCS | Mod: QW,S$GLB,, | Performed by: NURSE PRACTITIONER

## 2022-02-03 PROCEDURE — 99213 OFFICE O/P EST LOW 20 MIN: CPT | Mod: S$GLB,,, | Performed by: NURSE PRACTITIONER

## 2022-02-03 RX ORDER — MELOXICAM 15 MG/1
15 TABLET ORAL DAILY
COMMUNITY
Start: 2022-01-17 | End: 2023-01-04

## 2022-02-03 RX ORDER — PROMETHAZINE HYDROCHLORIDE AND DEXTROMETHORPHAN HYDROBROMIDE 6.25; 15 MG/5ML; MG/5ML
5 SYRUP ORAL NIGHTLY PRN
Qty: 120 ML | Refills: 0 | Status: SHIPPED | OUTPATIENT
Start: 2022-02-03 | End: 2022-02-13

## 2022-02-03 RX ORDER — BENZONATATE 200 MG/1
200 CAPSULE ORAL 3 TIMES DAILY PRN
Qty: 30 CAPSULE | Refills: 0 | Status: SHIPPED | OUTPATIENT
Start: 2022-02-03 | End: 2022-02-13

## 2022-02-03 RX ORDER — AZITHROMYCIN 250 MG/1
TABLET, FILM COATED ORAL
Qty: 6 TABLET | Refills: 0 | Status: SHIPPED | OUTPATIENT
Start: 2022-02-03 | End: 2023-01-04

## 2022-02-03 NOTE — PATIENT INSTRUCTIONS
Please drink plenty of fluids.  Please get plenty of rest.  Please return here or go to the Emergency Department for any concerns or worsening of condition.  If you were given wait & see antibiotics, please wait 5-7 days before taking them, and only take them if your symptoms have worsened or not improved.  If you do begin taking the antibiotics, please take them to completion.  If you were prescribed a narcotic medication, do not drive or operate heavy equipment or machinery while taking these medications.  If you do not have Hypertension or any history of palpitations, it is ok to take over the counter Sudafed or Mucinex D or Allegra-D or Claritin-D or Zyrtec-D.  If you do take one of the above, it is ok to combine that with plain over the counter Mucinex or Allegra or Claritin or Zyrtec.  If for example you are taking Zyrtec -D, you can combine that with Mucinex, but not Mucinex-D.  If you are taking Mucinex-D, you can combine that with plain Allegra or Claritin or Zyrtec.   If you do have Hypertension or palpitations, it is safe to take Coricidin HBP for relief of sinus symptoms.  If not allergic, please take over the counter Tylenol (Acetaminophen) and/or Motrin (Ibuprofen) as directed for control of pain and/or fever.  Please follow up with your primary care doctor or specialist as needed.    If you  smoke, please stop smoking.  Patient Education       Acute Bronchitis Discharge Instructions, Adult   About this topic   Acute bronchitis is an infection of the bronchi which are tubes that carry air into your lungs. Most of the time, this infection is caused by a virus so an antibiotic wont help. Your cough should get better within 2 to 3 weeks, but may take a bit longer.     What care is needed at home?   · Ask your doctor what you need to do when you go home. Make sure you ask questions if you do not understand what the doctor says.  · Do not smoke or be in smoke-filled places. Avoid other things that may  cause breathing problems like fumes, pollution, dust, and other common allergens.  · Drink lots of water, juice, or broth to replace fluids lost in runny nose and fever.  · If you have medicines to take when you are feeling short of breath, be sure to carry them with you. Then, you can take them when needed.  · If you smoke, stop.  · Take warm, steamy showers to help soothe the cough.  · Use a cool mist humidifier. This may make it easier to breathe.  · Use hard candy or cough drops to soothe sore throat and cough.  · Wash your hands often. This will help prevent you from spreading germs to others.  What follow-up care is needed?   · Your doctor may ask you to make visits to the office to check on your progress. Be sure to keep these visits.  · It may take 1 to 3 weeks to feel better.  · Ask your doctor if you need a vaccine to prevent problems from bronchitis.  What drugs may be needed?   Take your drugs as ordered by your doctor. The doctor may order drugs to:  · Make breathing easier  · Control coughing  · Lower swelling in your airways  · Treat infection  · Control extra mucus  Will physical activity be limited?   Your physical activities may be limited as long as you have the signs of this health problem. Avoid heavy and tiring activities. Talk to your doctor about the right amount of activity for you.  What problems could happen?   · Long-term swelling of the lungs. This is chronic bronchitis.  · Asthma  · Lung infection  · Cough continues even after you feel better  What can be done to prevent this health problem?   · Wash your hands often with soap and water for at least 20 seconds, especially after coughing or sneezing. Alcohol-based hand sanitizers also work to kill germs.       · If you are sick, cover your mouth and nose with tissue when you cough or sneeze. You can also cough into your elbow. Throw away tissues in the trash and wash your hands after touching used tissues.  · Do not get too close  (kissing, hugging) to people who are sick.  · Do not share towels or hankies with anyone who is sick.  · Clean commonly handled things like door handles, remotes, toys, and phones. Wipe them with a disinfectant.  · Stay away from crowded places.  · Stop smoking. Stay away from dust and fumes.  · Get a flu shot each year.     When do I need to call the doctor?   · Signs of infection. These include a fever of 100.4°F (38°C) or higher, chills, cough, more sputum or change in color of sputum.  · You are having so much trouble breathing that you can only say one or two words at a time.  · You need to sit upright at all times to be able to breathe and or cannot lie down.  · You have trouble breathing when talking or sitting still.  · You have a fever of 100.4°F (38°C) or higher or chills.  · You have chest pain when you cough, have trouble breathing but can still talk in full sentences, or cough up blood.  · You develop a barking cough.  · You are still coughing in 3 weeks.  Teach Back: Helping You Understand   The Teach Back Method helps you understand the information we are giving you. After you talk with the staff, tell them in your own words what you learned. This helps to make sure the staff has described each thing clearly. It also helps to explain things that may have been confusing. Before going home, make sure you can do these:  · I can tell you about my condition.  · I can tell you what may help ease my breathing.  · I can tell you what I will do if I have more trouble breathing, it is harder to breathe, or I feel like I am getting less air.  Where can I learn more?   [employer]  https://familydoctor.org/condition/acute-bronchitis/   [facility] Choices  https://www.nhs.uk/conditions/bronchitis/   Last Reviewed Date   2021-06-09  Consumer Information Use and Disclaimer   This information is not specific medical advice and does not replace information you receive from your health care provider.  This is only a brief summary of general information. It does NOT include all information about conditions, illnesses, injuries, tests, procedures, treatments, therapies, discharge instructions or life-style choices that may apply to you. You must talk with your health care provider for complete information about your health and treatment options. This information should not be used to decide whether or not to accept your health care providers advice, instructions or recommendations. Only your health care provider has the knowledge and training to provide advice that is right for you.  Copyright   Copyright © 2021 [employer] and its affiliates and/or licensors. All rights reserved.

## 2022-02-03 NOTE — PROGRESS NOTES
"Subjective:       Patient ID: Sy Aguiar is a 66 y.o. male.    Vitals:  height is 6' 1" (1.854 m) and weight is 136.1 kg (300 lb). His oral temperature is 98.7 °F (37.1 °C). His blood pressure is 138/80 and his pulse is 63. His respiration is 16 and oxygen saturation is 95%.     Chief Complaint: Cough    Patient reports dry cough that started last Saturday and is now more productive with yellow sputum and left frontal headaches.  Denies fever.  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  Denies associated chest pain.  States he thinks he caught a cold from his friend but he is not getting better.  Cough worse at night.  He also notes annoying cough while he's playing.    Post visit wanted to mention that he twice has had cramps in his upper legs after playing music all night.  Denies trauma or injury.  Denies back pain.  Denies numbness or weakness.  He notes that he's not having the pain  Anymore but wasn't sure if he could get a rx for prn muscle relaxants - advised against this and to f/u closely with his PCP for causes of muscle cramps that again he is no longer having.    Cough  This is a new problem. The current episode started 1 to 4 weeks ago. The problem has been unchanged. Episode frequency: mostly at night. The cough is non-productive. Associated symptoms include headaches, myalgias (thighs), postnasal drip, shortness of breath and wheezing. Pertinent negatives include no chest pain, chills, ear congestion, ear pain, fever, heartburn, hemoptysis, nasal congestion, rash, rhinorrhea, sore throat, sweats or weight loss. The symptoms are aggravated by lying down. He has tried OTC cough suppressant for the symptoms. The treatment provided mild relief. His past medical history is significant for bronchitis. There is no history of asthma.       Constitution: Negative for chills and fever.   HENT: Positive for postnasal drip. Negative for ear " pain and sore throat.    Cardiovascular: Negative for chest pain.   Respiratory: Positive for cough, shortness of breath and wheezing. Negative for bloody sputum.    Gastrointestinal: Negative for heartburn.   Musculoskeletal: Positive for muscle ache (thighs).   Skin: Negative for rash.   Neurological: Positive for headaches.       Objective:      Physical Exam   Constitutional: He is oriented to person, place, and time. He appears well-developed and well-nourished. He is cooperative.  Non-toxic appearance. He does not have a sickly appearance. He does not appear ill. No distress.   HENT:   Head: Normocephalic and atraumatic.   Ears:   Right Ear: Hearing, tympanic membrane, external ear and ear canal normal.   Left Ear: Hearing, tympanic membrane, external ear and ear canal normal.   Nose: Nose normal. No mucosal edema, rhinorrhea or nasal deformity. No epistaxis. Right sinus exhibits no maxillary sinus tenderness and no frontal sinus tenderness. Left sinus exhibits no maxillary sinus tenderness and no frontal sinus tenderness.   Mouth/Throat: Uvula is midline, oropharynx is clear and moist and mucous membranes are normal. No trismus in the jaw. Normal dentition. No uvula swelling. No oropharyngeal exudate, posterior oropharyngeal edema or posterior oropharyngeal erythema.   Eyes: Conjunctivae and lids are normal. No scleral icterus.   Neck: Trachea normal and phonation normal. Neck supple. No edema present. No erythema present. No neck rigidity present.   Cardiovascular: Normal rate, regular rhythm, normal heart sounds, intact distal pulses and normal pulses.   Pulmonary/Chest: Effort normal and breath sounds normal. No respiratory distress. He has no decreased breath sounds. He has no wheezes. He has no rhonchi.   Abdominal: Normal appearance.   Musculoskeletal: Normal range of motion.         General: No deformity or edema. Normal range of motion.   Neurological: He is alert and oriented to person, place, and  time. He exhibits normal muscle tone. Coordination normal.   Skin: Skin is warm, dry, intact, not diaphoretic and not pale.   Psychiatric: He has a normal mood and affect. His speech is normal and behavior is normal. Judgment and thought content normal. Cognition and memory  Nursing note and vitals reviewed.    Results for orders placed or performed in visit on 02/03/22   POCT COVID-19 Rapid Screening   Result Value Ref Range    POC Rapid COVID Negative Negative     Acceptable Yes            Assessment:       1. Sinobronchitis    2. Cough          Plan:       Lab reviewed.  Sinobronchitis  -     promethazine-dextromethorphan (PROMETHAZINE-DM) 6.25-15 mg/5 mL Syrp; Take 5 mLs by mouth nightly as needed (cough).  Dispense: 120 mL; Refill: 0  -     benzonatate (TESSALON) 200 MG capsule; Take 1 capsule (200 mg total) by mouth 3 (three) times daily as needed for Cough.  Dispense: 30 capsule; Refill: 0  -     azithromycin (ZITHROMAX Z-CORNELIO) 250 MG tablet; Take 2 po now  then 1 daily x 4 days  Dispense: 6 tablet; Refill: 0    Cough  -     POCT COVID-19 Rapid Screening  -     promethazine-dextromethorphan (PROMETHAZINE-DM) 6.25-15 mg/5 mL Syrp; Take 5 mLs by mouth nightly as needed (cough).  Dispense: 120 mL; Refill: 0  -     benzonatate (TESSALON) 200 MG capsule; Take 1 capsule (200 mg total) by mouth 3 (three) times daily as needed for Cough.  Dispense: 30 capsule; Refill: 0  -     azithromycin (ZITHROMAX Z-CORNELIO) 250 MG tablet; Take 2 po now  then 1 daily x 4 days  Dispense: 6 tablet; Refill: 0      Patient Instructions   Please drink plenty of fluids.  Please get plenty of rest.  Please return here or go to the Emergency Department for any concerns or worsening of condition.  If you were given wait & see antibiotics, please wait 5-7 days before taking them, and only take them if your symptoms have worsened or not improved.  If you do begin taking the antibiotics, please take them to completion.  If you were  prescribed a narcotic medication, do not drive or operate heavy equipment or machinery while taking these medications.  If you do not have Hypertension or any history of palpitations, it is ok to take over the counter Sudafed or Mucinex D or Allegra-D or Claritin-D or Zyrtec-D.  If you do take one of the above, it is ok to combine that with plain over the counter Mucinex or Allegra or Claritin or Zyrtec.  If for example you are taking Zyrtec -D, you can combine that with Mucinex, but not Mucinex-D.  If you are taking Mucinex-D, you can combine that with plain Allegra or Claritin or Zyrtec.   If you do have Hypertension or palpitations, it is safe to take Coricidin HBP for relief of sinus symptoms.  If not allergic, please take over the counter Tylenol (Acetaminophen) and/or Motrin (Ibuprofen) as directed for control of pain and/or fever.  Please follow up with your primary care doctor or specialist as needed.    If you  smoke, please stop smoking.  Patient Education       Acute Bronchitis Discharge Instructions, Adult   About this topic   Acute bronchitis is an infection of the bronchi which are tubes that carry air into your lungs. Most of the time, this infection is caused by a virus so an antibiotic wont help. Your cough should get better within 2 to 3 weeks, but may take a bit longer.     What care is needed at home?   · Ask your doctor what you need to do when you go home. Make sure you ask questions if you do not understand what the doctor says.  · Do not smoke or be in smoke-filled places. Avoid other things that may cause breathing problems like fumes, pollution, dust, and other common allergens.  · Drink lots of water, juice, or broth to replace fluids lost in runny nose and fever.  · If you have medicines to take when you are feeling short of breath, be sure to carry them with you. Then, you can take them when needed.  · If you smoke, stop.  · Take warm, steamy showers to help soothe the cough.  · Use a  cool mist humidifier. This may make it easier to breathe.  · Use hard candy or cough drops to soothe sore throat and cough.  · Wash your hands often. This will help prevent you from spreading germs to others.  What follow-up care is needed?   · Your doctor may ask you to make visits to the office to check on your progress. Be sure to keep these visits.  · It may take 1 to 3 weeks to feel better.  · Ask your doctor if you need a vaccine to prevent problems from bronchitis.  What drugs may be needed?   Take your drugs as ordered by your doctor. The doctor may order drugs to:  · Make breathing easier  · Control coughing  · Lower swelling in your airways  · Treat infection  · Control extra mucus  Will physical activity be limited?   Your physical activities may be limited as long as you have the signs of this health problem. Avoid heavy and tiring activities. Talk to your doctor about the right amount of activity for you.  What problems could happen?   · Long-term swelling of the lungs. This is chronic bronchitis.  · Asthma  · Lung infection  · Cough continues even after you feel better  What can be done to prevent this health problem?   · Wash your hands often with soap and water for at least 20 seconds, especially after coughing or sneezing. Alcohol-based hand sanitizers also work to kill germs.       · If you are sick, cover your mouth and nose with tissue when you cough or sneeze. You can also cough into your elbow. Throw away tissues in the trash and wash your hands after touching used tissues.  · Do not get too close (kissing, hugging) to people who are sick.  · Do not share towels or hankies with anyone who is sick.  · Clean commonly handled things like door handles, remotes, toys, and phones. Wipe them with a disinfectant.  · Stay away from crowded places.  · Stop smoking. Stay away from dust and fumes.  · Get a flu shot each year.     When do I need to call the doctor?   · Signs of infection. These include a  fever of 100.4°F (38°C) or higher, chills, cough, more sputum or change in color of sputum.  · You are having so much trouble breathing that you can only say one or two words at a time.  · You need to sit upright at all times to be able to breathe and or cannot lie down.  · You have trouble breathing when talking or sitting still.  · You have a fever of 100.4°F (38°C) or higher or chills.  · You have chest pain when you cough, have trouble breathing but can still talk in full sentences, or cough up blood.  · You develop a barking cough.  · You are still coughing in 3 weeks.  Teach Back: Helping You Understand   The Teach Back Method helps you understand the information we are giving you. After you talk with the staff, tell them in your own words what you learned. This helps to make sure the staff has described each thing clearly. It also helps to explain things that may have been confusing. Before going home, make sure you can do these:  · I can tell you about my condition.  · I can tell you what may help ease my breathing.  · I can tell you what I will do if I have more trouble breathing, it is harder to breathe, or I feel like I am getting less air.  Where can I learn more?   [employer]  https://familydoctor.org/condition/acute-bronchitis/   [facility] Choices  https://www.nhs.uk/conditions/bronchitis/   Last Reviewed Date   2021-06-09  Consumer Information Use and Disclaimer   This information is not specific medical advice and does not replace information you receive from your health care provider. This is only a brief summary of general information. It does NOT include all information about conditions, illnesses, injuries, tests, procedures, treatments, therapies, discharge instructions or life-style choices that may apply to you. You must talk with your health care provider for complete information about your health and treatment options. This information should not be used to decide  whether or not to accept your health care providers advice, instructions or recommendations. Only your health care provider has the knowledge and training to provide advice that is right for you.  Copyright   Copyright © 2021 [employer] and its affiliates and/or licensors. All rights reserved.

## 2022-02-08 ENCOUNTER — OFFICE VISIT (OUTPATIENT)
Dept: URGENT CARE | Facility: CLINIC | Age: 67
End: 2022-02-08
Payer: MEDICARE

## 2022-02-08 VITALS
TEMPERATURE: 97 F | HEART RATE: 53 BPM | OXYGEN SATURATION: 98 % | HEIGHT: 73 IN | RESPIRATION RATE: 17 BRPM | SYSTOLIC BLOOD PRESSURE: 137 MMHG | WEIGHT: 295 LBS | DIASTOLIC BLOOD PRESSURE: 88 MMHG | BODY MASS INDEX: 39.1 KG/M2

## 2022-02-08 DIAGNOSIS — R05.9 COUGH: ICD-10-CM

## 2022-02-08 DIAGNOSIS — B96.89 ACUTE BACTERIAL BRONCHITIS: Primary | ICD-10-CM

## 2022-02-08 DIAGNOSIS — J20.8 ACUTE BACTERIAL BRONCHITIS: Primary | ICD-10-CM

## 2022-02-08 LAB
CTP QC/QA: YES
SARS-COV-2 RDRP RESP QL NAA+PROBE: NEGATIVE

## 2022-02-08 PROCEDURE — 3075F PR MOST RECENT SYSTOLIC BLOOD PRESS GE 130-139MM HG: ICD-10-PCS | Mod: CPTII,S$GLB,, | Performed by: EMERGENCY MEDICINE

## 2022-02-08 PROCEDURE — 1160F RVW MEDS BY RX/DR IN RCRD: CPT | Mod: CPTII,S$GLB,, | Performed by: EMERGENCY MEDICINE

## 2022-02-08 PROCEDURE — 4010F PR ACE/ARB THEARPY RXD/TAKEN: ICD-10-PCS | Mod: CPTII,S$GLB,, | Performed by: EMERGENCY MEDICINE

## 2022-02-08 PROCEDURE — 99214 OFFICE O/P EST MOD 30 MIN: CPT | Mod: S$GLB,,, | Performed by: EMERGENCY MEDICINE

## 2022-02-08 PROCEDURE — 3079F DIAST BP 80-89 MM HG: CPT | Mod: CPTII,S$GLB,, | Performed by: EMERGENCY MEDICINE

## 2022-02-08 PROCEDURE — 3008F PR BODY MASS INDEX (BMI) DOCUMENTED: ICD-10-PCS | Mod: CPTII,S$GLB,, | Performed by: EMERGENCY MEDICINE

## 2022-02-08 PROCEDURE — 1160F PR REVIEW ALL MEDS BY PRESCRIBER/CLIN PHARMACIST DOCUMENTED: ICD-10-PCS | Mod: CPTII,S$GLB,, | Performed by: EMERGENCY MEDICINE

## 2022-02-08 PROCEDURE — 3079F PR MOST RECENT DIASTOLIC BLOOD PRESSURE 80-89 MM HG: ICD-10-PCS | Mod: CPTII,S$GLB,, | Performed by: EMERGENCY MEDICINE

## 2022-02-08 PROCEDURE — 3075F SYST BP GE 130 - 139MM HG: CPT | Mod: CPTII,S$GLB,, | Performed by: EMERGENCY MEDICINE

## 2022-02-08 PROCEDURE — 71046 X-RAY EXAM CHEST 2 VIEWS: CPT | Mod: S$GLB,,, | Performed by: RADIOLOGY

## 2022-02-08 PROCEDURE — 4010F ACE/ARB THERAPY RXD/TAKEN: CPT | Mod: CPTII,S$GLB,, | Performed by: EMERGENCY MEDICINE

## 2022-02-08 PROCEDURE — U0002: ICD-10-PCS | Mod: QW,S$GLB,, | Performed by: EMERGENCY MEDICINE

## 2022-02-08 PROCEDURE — 1159F PR MEDICATION LIST DOCUMENTED IN MEDICAL RECORD: ICD-10-PCS | Mod: CPTII,S$GLB,, | Performed by: EMERGENCY MEDICINE

## 2022-02-08 PROCEDURE — 71046 XR CHEST PA AND LATERAL: ICD-10-PCS | Mod: S$GLB,,, | Performed by: RADIOLOGY

## 2022-02-08 PROCEDURE — 1159F MED LIST DOCD IN RCRD: CPT | Mod: CPTII,S$GLB,, | Performed by: EMERGENCY MEDICINE

## 2022-02-08 PROCEDURE — U0002 COVID-19 LAB TEST NON-CDC: HCPCS | Mod: QW,S$GLB,, | Performed by: EMERGENCY MEDICINE

## 2022-02-08 PROCEDURE — 3008F BODY MASS INDEX DOCD: CPT | Mod: CPTII,S$GLB,, | Performed by: EMERGENCY MEDICINE

## 2022-02-08 PROCEDURE — 99214 PR OFFICE/OUTPT VISIT, EST, LEVL IV, 30-39 MIN: ICD-10-PCS | Mod: S$GLB,,, | Performed by: EMERGENCY MEDICINE

## 2022-02-08 RX ORDER — ALBUTEROL SULFATE 90 UG/1
2 AEROSOL, METERED RESPIRATORY (INHALATION) EVERY 6 HOURS PRN
Qty: 18 G | Refills: 0 | Status: SHIPPED | OUTPATIENT
Start: 2022-02-08 | End: 2022-02-15

## 2022-02-08 RX ORDER — AMOXICILLIN AND CLAVULANATE POTASSIUM 875; 125 MG/1; MG/1
1 TABLET, FILM COATED ORAL 2 TIMES DAILY
Qty: 20 TABLET | Refills: 0 | Status: SHIPPED | OUTPATIENT
Start: 2022-02-08 | End: 2022-02-18

## 2022-02-08 RX ORDER — ALBUTEROL SULFATE 90 UG/1
2 AEROSOL, METERED RESPIRATORY (INHALATION) EVERY 6 HOURS PRN
Qty: 18 G | Refills: 0 | Status: SHIPPED | OUTPATIENT
Start: 2022-02-08 | End: 2022-02-08 | Stop reason: SDUPTHER

## 2022-02-08 RX ORDER — CODEINE PHOSPHATE AND GUAIFENESIN 10; 100 MG/5ML; MG/5ML
5 SOLUTION ORAL EVERY 6 HOURS PRN
Qty: 120 ML | Refills: 0 | Status: SHIPPED | OUTPATIENT
Start: 2022-02-08 | End: 2022-02-18

## 2022-02-08 RX ORDER — AMOXICILLIN AND CLAVULANATE POTASSIUM 875; 125 MG/1; MG/1
1 TABLET, FILM COATED ORAL 2 TIMES DAILY
Qty: 20 TABLET | Refills: 0 | Status: SHIPPED | OUTPATIENT
Start: 2022-02-08 | End: 2022-02-08 | Stop reason: SDUPTHER

## 2022-02-08 NOTE — PATIENT INSTRUCTIONS
Please return here or go to the Emergency Department for any concerns or worsening of condition    Zyrtec, Claritin, or Allegra OTC as directed for the next 7 days    Tylenol 500mg 2 tabs by mouth every 8 hours  Max = 8 tabs per day    Coricidin OTC as directed for runny nose and congestion    Hot liquids with natural honey for cough and congestion    Follow up with Primary Care if not improved in 7-10 Days:  [id number]      Bronchitis, Viral (Adult)    You have a viral bronchitis. Bronchitis is inflammation and swelling of the lining of the lungs. This is often caused by an infection. Symptoms include a dry, hacking cough that is worse at night. The cough may bring up yellow-green mucus. You may also feel short of breath or wheeze. Other symptoms may include tiredness, chest discomfort, and chills.  Bronchitis that is caused by a virus is not treated with antibiotics. Instead, medicines may be given to help relieve symptoms. Symptoms can last up to 2 weeks, although the cough may last much longer.  This illness is contagious during the first few days and is spread through the air by coughing and sneezing, or by direct contact (touching the sick person and then touching your own eyes, nose, or mouth).  Most viral illnesses resolve within 10 to 14 days with rest and simple home remedies, although they may sometimes last for several weeks.  Home care  · If symptoms are severe, rest at home for the first 2 to 3 days. When you go back to your usual activities, don't let yourself get too tired.  · Do not smoke. Also avoid being exposed to secondhand smoke.  · You may use over-the-counter medicine to control fever or pain, unless another pain medicine was prescribed. (Note: If you have chronic liver or kidney disease or have ever had a stomach ulcer or gastrointestinal bleeding, talk with your healthcare provider before using these medicines. Also talk to your provider if you are taking medicine to prevent blood clots.)  Aspirin should never be given to anyone younger than 18 years of age who is ill with a viral infection or fever. It may cause severe liver or brain damage.  · Your appetite may be poor, so a light diet is fine. Avoid dehydration by drinking 6 to 8 glasses of fluids per day (such as water, soft drinks, sports drinks, juices, tea, or soup). Extra fluids will help loosen secretions in the nose and lungs.  · Over-the-counter cough, cold, and sore-throat medicines will not shorten the length of the illness, but they may help to reduce symptoms. (Note: Do not use decongestants if you have high blood pressure.)  Follow-up care  Follow up with your healthcare provider, or as advised. If you had an X-ray or ECG (electrocardiogram), a specialist will review it. You will be notified of any new findings that may affect your care.  Note: If you are age 65 or older, or if you have a chronic lung disease or condition that affects your immune system, or you smoke, talk to your healthcare provider about having pneumococcal vaccinations and a yearly influenza vaccination (flu shot).  When to seek medical advice  Call your healthcare provider right away if any of these occur:  · Fever of 100.4°F (38°C) or higher  · Coughing up increased amounts of colored sputum  · Weakness, drowsiness, headache, facial pain, ear pain, or a stiff neck  Call 911, or get immediate medical care  Contact emergency services right away if any of these occur:  · Coughing up blood  · Worsening weakness, drowsiness, headache, or stiff neck  · Trouble breathing, wheezing, or pain with breathing  Date Last Reviewed: 9/13/2015 © 2000-2016 [employer]. [address] [place] [id number]. All rights reserved. This information is not intended as a substitute for professional medical care. Always follow your healthcare professional's instructions.        When to Use Antibiotics   Antibiotics are medicines used to treat infections caused by  bacteria. They dont work for illnesses caused by viruses or an allergic reaction. In fact, taking antibiotics for reasons other than a bacterial infection can cause problems. For example, you may have side effects from the medicine. And if you really need an antibiotic, it may not work well.                                                                                                                                              When antibiotics wont help  Your healthcare provider wont usually prescribe antibiotics for the following conditions. You can help by not asking for them if you have:   · A cold. This type of illness is caused by a virus. It can cause a runny nose, stuffed-up nose, sneezing, coughing, headache, mild body aches, and low fever. A cold gets better on its own in a few days to a week.  · The flu (influenza). This is a respiratory illness caused by a virus. The flu usually goes away on its own in a week or so. It can cause fever, body aches, sore throat, and fatigue.  · Bronchitis. This is an infection in the lungs most often caused by a virus. You may have coughing, phlegm, body aches, and a low fever. A common type of bronchitis is known as a chest cold (acute bronchitis). This often happens after you have a respiratory infection like a common cold. Bronchitis can take weeks to go away, but antibiotics usually dont help.  · Most sore throats. Sore throats are most often caused by viruses. Your throat may feel scratchy or achy, and it may hurt to swallow. You may also have a low fever and body aches. A sore throat usually gets better in a few days.  · Most ear infections. An ear infection may be caused by a virus or bacteria. It causes pain in the ear. Antibiotics usually dont help, and the infection goes away on its own.  · Most sinus infections (sinusitis). This kind of infection causes sinus pain and swelling, and a runny nose. In most cases, sinusitis goes away on its own, and  antibiotics dont make recovery quicker.  · Allergic rhinitis. This is a set of symptoms caused by an allergic reaction. You may have sneezing, a runny nose, itchy or watery eyes, or a sore throat. Allergies are not treated with antibiotics.  · Low fever. A mild fever thats less than 100.4°F (38°C) most likely doesnt need treatment with antibiotics.   When antibiotics can help   Antibiotics can be used to treat:                                                     · Strep throat. This is a throat infectioncaused by a certain type of bacteria. Symptoms of strep throat include a sore throat, white patches on the tonsils, red spots on the roof of the mouth, fever, body aches, and nausea and vomiting.  · Urinary tract infection (UTI). This is a bacterial infection of the bladder and the tube that takes urine out of the body. It can cause burning pain and urine thats cloudy or tinted with blood. UTIs are very common. Antibiotics usually help treat these infections.  · Some ear infections. In some cases, a healthcare provider may prescribe antibiotics for an ear infection. You may need a test to show whats causing the ear infection.  · Some sinus infections. In some cases, yourhealthcare provider may give you antibiotics. He or she may first need to make sure your symptoms arent caused by a virus, fungus, allergies, or air pollutants such as smoke.   Your doctor may also recommend antibiotics if you have a condition that can affect your immune system, such as diabetes or cancer.   Self-care at home   If your infection cant be treated with antibiotics, you can take other steps to feel better. Try the remedies below. In general:   · Rest and sleep as much as needed.  · Drink water and other clear fluids.  · Dont smoke, and avoid smoke from other people.  · Use over-the-counter medicine such as acetaminophen to ease pain or fever, as directed by your healthcare provider.   To treat sinus pain or nasal  congestion:   · Put a warm, moist washcloth on your face where you feel sinus pain or pressure.  · Use a nasal spray with medicine or saline, as directed by your healthcare provider.  · Breathe in steam from a hot shower.  · Use a humidifier or cool mist vaporizer.   To quiet a cough:   · Use a humidifier or cool mist vaporizer.  · Breathe in steam from a hot shower.  · Use cough lozenges.   To sooth a sore throat:   · Suck on ice chips, popsicles, or lozenges.  · Use a sore throat spray.  · Use a humidifier or cool mist vaporizer.  · Gargle with saltwater.  · Drink warm liquids.   To ease ear pain:   · Hold a warm, moist washcloth on the ear for 10 minutes at a time.  Date Last Reviewed: 9/1/2016  © 0538-9045 [employer]. [address] [place] [address], [place] [address]. All rights reserved. This information is not intended as a substitute for professional medical care. Always follow your healthcare professional's instructions.

## 2022-02-08 NOTE — PROGRESS NOTES
"Subjective:       Patient ID: Sy Aguiar is a 66 y.o. male.    Vitals:  height is 6' 0.99" (1.854 m) and weight is 133.8 kg (295 lb). His oral temperature is 97.4 °F (36.3 °C). His blood pressure is 137/88 and his pulse is 53 (abnormal). His respiration is 17 and oxygen saturation is 98%.     Chief Complaint: URI    Patient complains of sore throat, cough and yellow mucus, patient states that his throat makes a sound like a horn, patient also states that feels sore on both sides of is ribs due to the cough.   Symptoms started a week ago, patient was here 5 days ago and tested negative for Covid 19, was prescribed medicine but has not helped at all.   Patient is vaccinated, and states unknown exposure to Covid 19.    URI   This is a recurrent problem. The current episode started in the past 7 days. The problem has been unchanged. There has been no fever. Associated symptoms include congestion, coughing and a sore throat. Pertinent negatives include no chest pain, headaches or wheezing. Treatments tried: Cough syrup. The treatment provided no relief.       HENT: Positive for congestion and sore throat.    Cardiovascular: Negative for chest pain.   Respiratory: Positive for cough. Negative for wheezing.    Skin: Negative for erythema.   Neurological: Negative for headaches.       Objective:      Physical Exam   Constitutional: He is oriented to person, place, and time. He appears well-developed and well-nourished. He is cooperative.  Non-toxic appearance. He does not have a sickly appearance. He does not appear ill. No distress.      Comments:Increased BMI   HENT:   Head: Normocephalic and atraumatic. Head is without abrasion, without contusion and without laceration.   Ears:   Right Ear: Hearing, tympanic membrane, external ear and ear canal normal.   Left Ear: Hearing, tympanic membrane, external ear and ear canal normal.   Oropharyngeal exam not performed due to risk of viral transmission during global " pandemic-- risks outweigh benefits of exam          Comments: Oropharyngeal exam not performed due to risk of viral transmission during global pandemic-- risks outweigh benefits of exam      Eyes: Conjunctivae, EOM and lids are normal. Pupils are equal, round, and reactive to light. No scleral icterus.   Neck: Trachea normal and phonation normal. Neck supple. No edema present. No erythema present. No neck rigidity present.   Cardiovascular: Normal rate, regular rhythm, normal heart sounds, intact distal pulses and normal pulses.   Pulmonary/Chest: Effort normal. No stridor. No respiratory distress. He has no decreased breath sounds. He has wheezes (occasional end expiratory). He has no rhonchi.   Frequent cough on exam    Comments: Frequent cough on exam    Abdominal: Normal appearance.   Musculoskeletal: Normal range of motion.         General: No deformity or edema. Normal range of motion.   Neurological: He is alert and oriented to person, place, and time. He exhibits normal muscle tone. Coordination normal.   Skin: Skin is warm, dry, intact, not diaphoretic, not pale and no rash. Capillary refill takes less than 2 seconds. No abrasion, No burn, No bruising, No erythema and No ecchymosis   Psychiatric: He has a normal mood and affect. His speech is normal and behavior is normal. Judgment and thought content normal. Cognition and memory  Nursing note and vitals reviewed.        Assessment:       1. Acute bacterial bronchitis    2. Cough          Plan:         Acute bacterial bronchitis    Cough  -     X-Ray Chest PA And Lateral; Future; Expected date: 02/08/2022  -     POCT COVID-19 Rapid Screening    Other orders  -     albuterol (PROVENTIL/VENTOLIN HFA) 90 mcg/actuation inhaler; Inhale 2 puffs into the lungs every 6 (six) hours as needed for Wheezing. Rescue PLEASE INCLUDE SPACER  Dispense: 18 g; Refill: 0  -     guaiFENesin-codeine 100-10 mg/5 ml (GUAIFENESIN AC)  mg/5 mL syrup; Take 5 mLs by mouth every  6 (six) hours as needed for Cough.  Dispense: 120 mL; Refill: 0  -     amoxicillin-clavulanate 875-125mg (AUGMENTIN) 875-125 mg per tablet; Take 1 tablet by mouth 2 (two) times daily. for 10 days  Dispense: 20 tablet; Refill: 0         X-Ray Chest PA And Lateral    Result Date: 2/8/2022  EXAMINATION: XR CHEST PA AND LATERAL CLINICAL HISTORY: Cough, unspecified FINDINGS: Chest two views: Heart size is normal.  Lungs are clear.  The bones showed DJD.     No acute process seen. Electronically signed by: Jj Crawfrod MD Date:    02/08/2022 Time:    08:37          Patient Instructions   Please return here or go to the Emergency Department for any concerns or worsening of condition    Zyrtec, Claritin, or Allegra OTC as directed for the next 7 days    Tylenol 500mg 2 tabs by mouth every 8 hours  Max = 8 tabs per day    Coricidin OTC as directed for runny nose and congestion    Hot liquids with natural honey for cough and congestion    Follow up with Primary Care if not improved in 7-10 Days:  [id number]      Bronchitis, Viral (Adult)    You have a viral bronchitis. Bronchitis is inflammation and swelling of the lining of the lungs. This is often caused by an infection. Symptoms include a dry, hacking cough that is worse at night. The cough may bring up yellow-green mucus. You may also feel short of breath or wheeze. Other symptoms may include tiredness, chest discomfort, and chills.  Bronchitis that is caused by a virus is not treated with antibiotics. Instead, medicines may be given to help relieve symptoms. Symptoms can last up to 2 weeks, although the cough may last much longer.  This illness is contagious during the first few days and is spread through the air by coughing and sneezing, or by direct contact (touching the sick person and then touching your own eyes, nose, or mouth).  Most viral illnesses resolve within 10 to 14 days with rest and simple home remedies, although they may sometimes last for several  weeks.  Home care  · If symptoms are severe, rest at home for the first 2 to 3 days. When you go back to your usual activities, don't let yourself get too tired.  · Do not smoke. Also avoid being exposed to secondhand smoke.  · You may use over-the-counter medicine to control fever or pain, unless another pain medicine was prescribed. (Note: If you have chronic liver or kidney disease or have ever had a stomach ulcer or gastrointestinal bleeding, talk with your healthcare provider before using these medicines. Also talk to your provider if you are taking medicine to prevent blood clots.) Aspirin should never be given to anyone younger than 18 years of age who is ill with a viral infection or fever. It may cause severe liver or brain damage.  · Your appetite may be poor, so a light diet is fine. Avoid dehydration by drinking 6 to 8 glasses of fluids per day (such as water, soft drinks, sports drinks, juices, tea, or soup). Extra fluids will help loosen secretions in the nose and lungs.  · Over-the-counter cough, cold, and sore-throat medicines will not shorten the length of the illness, but they may help to reduce symptoms. (Note: Do not use decongestants if you have high blood pressure.)  Follow-up care  Follow up with your healthcare provider, or as advised. If you had an X-ray or ECG (electrocardiogram), a specialist will review it. You will be notified of any new findings that may affect your care.  Note: If you are age 65 or older, or if you have a chronic lung disease or condition that affects your immune system, or you smoke, talk to your healthcare provider about having pneumococcal vaccinations and a yearly influenza vaccination (flu shot).  When to seek medical advice  Call your healthcare provider right away if any of these occur:  · Fever of 100.4°F (38°C) or higher  · Coughing up increased amounts of colored sputum  · Weakness, drowsiness, headache, facial pain, ear pain, or a stiff neck  Call 911, or  get immediate medical care  Contact emergency services right away if any of these occur:  · Coughing up blood  · Worsening weakness, drowsiness, headache, or stiff neck  · Trouble breathing, wheezing, or pain with breathing  Date Last Reviewed: 9/13/2015 © 2000-2016 [employer]. [address] [place] [address]. All rights reserved. This information is not intended as a substitute for professional medical care. Always follow your healthcare professional's instructions.        When to Use Antibiotics   Antibiotics are medicines used to treat infections caused by bacteria. They dont work for illnesses caused by viruses or an allergic reaction. In fact, taking antibiotics for reasons other than a bacterial infection can cause problems. For example, you may have side effects from the medicine. And if you really need an antibiotic, it may not work well.                                                                                                                                              When antibiotics wont help  Your healthcare provider wont usually prescribe antibiotics for the following conditions. You can help by not asking for them if you have:   · A cold. This type of illness is caused by a virus. It can cause a runny nose, stuffed-up nose, sneezing, coughing, headache, mild body aches, and low fever. A cold gets better on its own in a few days to a week.  · The flu (influenza). This is a respiratory illness caused by a virus. The flu usually goes away on its own in a week or so. It can cause fever, body aches, sore throat, and fatigue.  · Bronchitis. This is an infection in the lungs most often caused by a virus. You may have coughing, phlegm, body aches, and a low fever. A common type of bronchitis is known as a chest cold (acute bronchitis). This often happens after you have a respiratory infection like a common cold. Bronchitis can take weeks to go away, but antibiotics usually  dont help.  · Most sore throats. Sore throats are most often caused by viruses. Your throat may feel scratchy or achy, and it may hurt to swallow. You may also have a low fever and body aches. A sore throat usually gets better in a few days.  · Most ear infections. An ear infection may be caused by a virus or bacteria. It causes pain in the ear. Antibiotics usually dont help, and the infection goes away on its own.  · Most sinus infections (sinusitis). This kind of infection causes sinus pain and swelling, and a runny nose. In most cases, sinusitis goes away on its own, and antibiotics dont make recovery quicker.  · Allergic rhinitis. This is a set of symptoms caused by an allergic reaction. You may have sneezing, a runny nose, itchy or watery eyes, or a sore throat. Allergies are not treated with antibiotics.  · Low fever. A mild fever thats less than 100.4°F (38°C) most likely doesnt need treatment with antibiotics.   When antibiotics can help   Antibiotics can be used to treat:                                                     · Strep throat. This is a throat infectioncaused by a certain type of bacteria. Symptoms of strep throat include a sore throat, white patches on the tonsils, red spots on the roof of the mouth, fever, body aches, and nausea and vomiting.  · Urinary tract infection (UTI). This is a bacterial infection of the bladder and the tube that takes urine out of the body. It can cause burning pain and urine thats cloudy or tinted with blood. UTIs are very common. Antibiotics usually help treat these infections.  · Some ear infections. In some cases, a healthcare provider may prescribe antibiotics for an ear infection. You may need a test to show whats causing the ear infection.  · Some sinus infections. In some cases, yourhealthcare provider may give you antibiotics. He or she may first need to make sure your symptoms arent caused by a virus, fungus, allergies, or air pollutants such as  smoke.   Your doctor may also recommend antibiotics if you have a condition that can affect your immune system, such as diabetes or cancer.   Self-care at home   If your infection cant be treated with antibiotics, you can take other steps to feel better. Try the remedies below. In general:   · Rest and sleep as much as needed.  · Drink water and other clear fluids.  · Dont smoke, and avoid smoke from other people.  · Use over-the-counter medicine such as acetaminophen to ease pain or fever, as directed by your healthcare provider.   To treat sinus pain or nasal congestion:   · Put a warm, moist washcloth on your face where you feel sinus pain or pressure.  · Use a nasal spray with medicine or saline, as directed by your healthcare provider.  · Breathe in steam from a hot shower.  · Use a humidifier or cool mist vaporizer.   To quiet a cough:   · Use a humidifier or cool mist vaporizer.  · Breathe in steam from a hot shower.  · Use cough lozenges.   To sooth a sore throat:   · Suck on ice chips, popsicles, or lozenges.  · Use a sore throat spray.  · Use a humidifier or cool mist vaporizer.  · Gargle with saltwater.  · Drink warm liquids.   To ease ear pain:   · Hold a warm, moist washcloth on the ear for 10 minutes at a time.  Date Last Reviewed: 9/1/2016  © 9259-1344 [employer]. [address] [place] [address], [place] [address]. All rights reserved. This information is not intended as a substitute for professional medical care. Always follow your healthcare professional's instructions.

## 2022-03-09 ENCOUNTER — OFFICE VISIT (OUTPATIENT)
Dept: URGENT CARE | Facility: CLINIC | Age: 67
End: 2022-03-09
Payer: MEDICARE

## 2022-03-09 VITALS
RESPIRATION RATE: 18 BRPM | DIASTOLIC BLOOD PRESSURE: 78 MMHG | HEIGHT: 73 IN | TEMPERATURE: 98 F | WEIGHT: 295 LBS | BODY MASS INDEX: 39.1 KG/M2 | OXYGEN SATURATION: 99 % | SYSTOLIC BLOOD PRESSURE: 124 MMHG | HEART RATE: 53 BPM

## 2022-03-09 DIAGNOSIS — R09.82 POST-NASAL DRAINAGE: ICD-10-CM

## 2022-03-09 DIAGNOSIS — J04.0 LARYNGITIS: Primary | ICD-10-CM

## 2022-03-09 PROCEDURE — 1160F RVW MEDS BY RX/DR IN RCRD: CPT | Mod: CPTII,S$GLB,,

## 2022-03-09 PROCEDURE — 1159F MED LIST DOCD IN RCRD: CPT | Mod: CPTII,S$GLB,,

## 2022-03-09 PROCEDURE — 4010F ACE/ARB THERAPY RXD/TAKEN: CPT | Mod: CPTII,S$GLB,,

## 2022-03-09 PROCEDURE — 3074F SYST BP LT 130 MM HG: CPT | Mod: CPTII,S$GLB,,

## 2022-03-09 PROCEDURE — 99213 PR OFFICE/OUTPT VISIT, EST, LEVL III, 20-29 MIN: ICD-10-PCS | Mod: S$GLB,,,

## 2022-03-09 PROCEDURE — 3008F PR BODY MASS INDEX (BMI) DOCUMENTED: ICD-10-PCS | Mod: CPTII,S$GLB,,

## 2022-03-09 PROCEDURE — 1159F PR MEDICATION LIST DOCUMENTED IN MEDICAL RECORD: ICD-10-PCS | Mod: CPTII,S$GLB,,

## 2022-03-09 PROCEDURE — 1160F PR REVIEW ALL MEDS BY PRESCRIBER/CLIN PHARMACIST DOCUMENTED: ICD-10-PCS | Mod: CPTII,S$GLB,,

## 2022-03-09 PROCEDURE — 1126F PR PAIN SEVERITY QUANTIFIED, NO PAIN PRESENT: ICD-10-PCS | Mod: CPTII,S$GLB,,

## 2022-03-09 PROCEDURE — 3078F PR MOST RECENT DIASTOLIC BLOOD PRESSURE < 80 MM HG: ICD-10-PCS | Mod: CPTII,S$GLB,,

## 2022-03-09 PROCEDURE — 3008F BODY MASS INDEX DOCD: CPT | Mod: CPTII,S$GLB,,

## 2022-03-09 PROCEDURE — 1126F AMNT PAIN NOTED NONE PRSNT: CPT | Mod: CPTII,S$GLB,,

## 2022-03-09 PROCEDURE — 3074F PR MOST RECENT SYSTOLIC BLOOD PRESSURE < 130 MM HG: ICD-10-PCS | Mod: CPTII,S$GLB,,

## 2022-03-09 PROCEDURE — 3078F DIAST BP <80 MM HG: CPT | Mod: CPTII,S$GLB,,

## 2022-03-09 PROCEDURE — 99213 OFFICE O/P EST LOW 20 MIN: CPT | Mod: S$GLB,,,

## 2022-03-09 PROCEDURE — 4010F PR ACE/ARB THEARPY RXD/TAKEN: ICD-10-PCS | Mod: CPTII,S$GLB,,

## 2022-03-09 RX ORDER — PREDNISONE 20 MG/1
40 TABLET ORAL DAILY
Qty: 8 TABLET | Refills: 0 | Status: SHIPPED | OUTPATIENT
Start: 2022-03-09 | End: 2022-03-13

## 2022-03-09 RX ORDER — CETIRIZINE HYDROCHLORIDE 5 MG/1
5 TABLET ORAL DAILY
Qty: 30 TABLET | Refills: 0 | Status: SHIPPED | OUTPATIENT
Start: 2022-03-09 | End: 2023-01-04

## 2022-03-09 RX ORDER — IPRATROPIUM BROMIDE 42 UG/1
2 SPRAY, METERED NASAL 4 TIMES DAILY
Qty: 15 ML | Status: SHIPPED | OUTPATIENT
Start: 2022-03-09 | End: 2022-04-08

## 2022-03-09 NOTE — PATIENT INSTRUCTIONS
- All risks and benefits of receiving steroids were discussed with the patient and they expressed understanding. Make sure to drink plenty of water and stay away from processed foods and sugar.   - Make sure to rest to vocal cords as much as possible to help with recovery.   - Start antihistamine to help with congestion and post nasal drip.  - Start nasal spray to help with sinus congestion.   - Rest.    - Drink plenty of fluids.    - Acetaminophen (tylenol) or Ibuprofen (advil,motrin) as directed as needed for fever/pain. Avoid tylenol if you have a history of liver disease. Do not take ibuprofen if you have a history of GI bleeding, kidney disease, or if you take blood thinners.     - You must understand that you have received an Urgent Care treatment only and that you may be released before all of your medical problems are known or treated.   - You, the patient, will arrange for follow up care as instructed.   - If your condition worsens or fails to improve we recommend that you receive another evaluation at the ER immediately or contact your PCP to discuss your concerns or return here.   - Follow up with your PCP or specialty clinic as directed in the next 1-2 weeks if not improved or as needed.  You can call [phone] to schedule an appointment with the appropriate provider.    If your symptoms do not improve or worsen, go to the emergency room immediately.

## 2022-03-09 NOTE — PROGRESS NOTES
"Subjective:       Patient ID: Sy Augiar is a 66 y.o. male.    Vitals:  height is 6' 0.99" (1.854 m) and weight is 133.8 kg (295 lb). His oral temperature is 97.7 °F (36.5 °C). His blood pressure is 124/78 and his pulse is 53 (abnormal). His respiration is 18 and oxygen saturation is 99%.     Chief Complaint: Cough    Patient states that had cough for a few days and today complains that when he tries to clear his throat it sounds like a seal barking. He was here about a month ago, given antibiotics and an inhaler which helped, but then his symptoms got worse 3 days ago. He states that he is hoarse. He is coughing up a clear phlegm. Patient is a musician,.     Other  This is a new problem. The current episode started in the past 7 days. Associated symptoms include congestion and coughing. Pertinent negatives include no chest pain, chills, diaphoresis, fatigue, headaches, nausea, neck pain, rash, sore throat or vomiting. Nothing aggravates the symptoms. Treatments tried: Nyquil.       Constitution: Negative for chills, sweating and fatigue.   HENT: Positive for congestion, postnasal drip and voice change. Negative for ear pain, sinus pain, sinus pressure, sore throat and trouble swallowing.    Neck: Negative for neck pain and neck stiffness.   Cardiovascular: Negative for chest trauma and chest pain.   Eyes: Negative for eye itching, eye pain and eye redness.   Respiratory: Positive for cough and sputum production. Negative for shortness of breath.    Gastrointestinal: Negative for nausea, vomiting, constipation and diarrhea.   Musculoskeletal: Negative for pain and trauma.   Skin: Negative for pale, rash and hives.   Allergic/Immunologic: Negative for hives, itching and sneezing.   Neurological: Negative for dizziness, light-headedness, headaches, disorientation and altered mental status.   Psychiatric/Behavioral: Negative for altered mental status, disorientation and confusion.       Objective:    "   Physical Exam   Constitutional: He is oriented to person, place, and time. He appears well-developed. He is cooperative.  Non-toxic appearance. He does not appear ill. No distress.      Comments:Patient sits comfortably in exam chair. Answers questions in complete sentences. Does not show any signs of distress or discoloration.      HENT:   Head: Normocephalic and atraumatic.   Ears:   Right Ear: Hearing, tympanic membrane, external ear and ear canal normal.   Left Ear: Hearing, tympanic membrane, external ear and ear canal normal.   Nose: Congestion present. No mucosal edema, rhinorrhea or nasal deformity. No epistaxis. Right sinus exhibits no maxillary sinus tenderness and no frontal sinus tenderness. Left sinus exhibits no maxillary sinus tenderness and no frontal sinus tenderness.   Mouth/Throat: Uvula is midline and mucous membranes are normal. No trismus in the jaw. Normal dentition. No uvula swelling. Posterior oropharyngeal erythema present. No oropharyngeal exudate or posterior oropharyngeal edema.   Eyes: Conjunctivae and lids are normal. No scleral icterus.   Neck: Trachea normal and phonation normal. Neck supple. No edema present. No erythema present. No neck rigidity present.   Cardiovascular: Normal rate, regular rhythm, normal heart sounds and normal pulses.   Pulmonary/Chest: Effort normal and breath sounds normal. No stridor. No respiratory distress. He has no decreased breath sounds. He has no wheezes. He has no rhonchi. He has no rales.   Abdominal: Normal appearance.   Musculoskeletal: Normal range of motion.         General: No deformity. Normal range of motion.   Neurological: He is alert and oriented to person, place, and time. He exhibits normal muscle tone. Coordination normal.   Skin: Skin is warm, dry, intact, not diaphoretic and not pale.   Psychiatric: His speech is normal and behavior is normal. Judgment and thought content normal.   Nursing note and vitals reviewed.        Results  for orders placed or performed in visit on 02/08/22   POCT COVID-19 Rapid Screening   Result Value Ref Range    POC Rapid COVID Negative Negative     Acceptable Yes        Assessment:       1. Laryngitis    2. Post-nasal drainage          Plan:         Laryngitis  -     predniSONE (DELTASONE) 20 MG tablet; Take 2 tablets (40 mg total) by mouth once daily. for 4 days  Dispense: 8 tablet; Refill: 0    Post-nasal drainage  -     cetirizine (ZYRTEC) 5 MG tablet; Take 1 tablet (5 mg total) by mouth once daily.  Dispense: 30 tablet; Refill: 0  -     ipratropium (ATROVENT) 42 mcg (0.06 %) nasal spray; 2 sprays by Nasal route 4 (four) times daily.  Dispense: 15 mL; Refill: ml                 Patient Instructions   - All risks and benefits of receiving steroids were discussed with the patient and they expressed understanding. Make sure to drink plenty of water and stay away from processed foods and sugar.   - Make sure to rest to vocal cords as much as possible to help with recovery.   - Start antihistamine to help with congestion and post nasal drip.  - Start nasal spray to help with sinus congestion.   - Rest.    - Drink plenty of fluids.    - Acetaminophen (tylenol) or Ibuprofen (advil,motrin) as directed as needed for fever/pain. Avoid tylenol if you have a history of liver disease. Do not take ibuprofen if you have a history of GI bleeding, kidney disease, or if you take blood thinners.     - You must understand that you have received an Urgent Care treatment only and that you may be released before all of your medical problems are known or treated.   - You, the patient, will arrange for follow up care as instructed.   - If your condition worsens or fails to improve we recommend that you receive another evaluation at the ER immediately or contact your PCP to discuss your concerns or return here.   - Follow up with your PCP or specialty clinic as directed in the next 1-2 weeks if not improved or as needed.   You can call [phone] to schedule an appointment with the appropriate provider.    If your symptoms do not improve or worsen, go to the emergency room immediately.

## 2022-04-13 ENCOUNTER — OFFICE VISIT (OUTPATIENT)
Dept: URGENT CARE | Facility: CLINIC | Age: 67
End: 2022-04-13
Payer: MEDICARE

## 2022-04-13 VITALS
HEART RATE: 74 BPM | OXYGEN SATURATION: 98 % | SYSTOLIC BLOOD PRESSURE: 139 MMHG | TEMPERATURE: 98 F | WEIGHT: 295 LBS | HEIGHT: 72 IN | DIASTOLIC BLOOD PRESSURE: 74 MMHG | RESPIRATION RATE: 18 BRPM | BODY MASS INDEX: 39.96 KG/M2

## 2022-04-13 DIAGNOSIS — R06.2 WHEEZES: ICD-10-CM

## 2022-04-13 DIAGNOSIS — J30.9 ALLERGIC RHINITIS, UNSPECIFIED SEASONALITY, UNSPECIFIED TRIGGER: Primary | ICD-10-CM

## 2022-04-13 PROCEDURE — 3075F SYST BP GE 130 - 139MM HG: CPT | Mod: CPTII,S$GLB,, | Performed by: NURSE PRACTITIONER

## 2022-04-13 PROCEDURE — 4010F PR ACE/ARB THEARPY RXD/TAKEN: ICD-10-PCS | Mod: CPTII,S$GLB,, | Performed by: NURSE PRACTITIONER

## 2022-04-13 PROCEDURE — 1159F MED LIST DOCD IN RCRD: CPT | Mod: CPTII,S$GLB,, | Performed by: NURSE PRACTITIONER

## 2022-04-13 PROCEDURE — 3008F PR BODY MASS INDEX (BMI) DOCUMENTED: ICD-10-PCS | Mod: CPTII,S$GLB,, | Performed by: NURSE PRACTITIONER

## 2022-04-13 PROCEDURE — 1159F PR MEDICATION LIST DOCUMENTED IN MEDICAL RECORD: ICD-10-PCS | Mod: CPTII,S$GLB,, | Performed by: NURSE PRACTITIONER

## 2022-04-13 PROCEDURE — 3078F PR MOST RECENT DIASTOLIC BLOOD PRESSURE < 80 MM HG: ICD-10-PCS | Mod: CPTII,S$GLB,, | Performed by: NURSE PRACTITIONER

## 2022-04-13 PROCEDURE — 3008F BODY MASS INDEX DOCD: CPT | Mod: CPTII,S$GLB,, | Performed by: NURSE PRACTITIONER

## 2022-04-13 PROCEDURE — 1160F PR REVIEW ALL MEDS BY PRESCRIBER/CLIN PHARMACIST DOCUMENTED: ICD-10-PCS | Mod: CPTII,S$GLB,, | Performed by: NURSE PRACTITIONER

## 2022-04-13 PROCEDURE — 4010F ACE/ARB THERAPY RXD/TAKEN: CPT | Mod: CPTII,S$GLB,, | Performed by: NURSE PRACTITIONER

## 2022-04-13 PROCEDURE — 99214 PR OFFICE/OUTPT VISIT, EST, LEVL IV, 30-39 MIN: ICD-10-PCS | Mod: S$GLB,,, | Performed by: NURSE PRACTITIONER

## 2022-04-13 PROCEDURE — 99214 OFFICE O/P EST MOD 30 MIN: CPT | Mod: S$GLB,,, | Performed by: NURSE PRACTITIONER

## 2022-04-13 PROCEDURE — 1126F PR PAIN SEVERITY QUANTIFIED, NO PAIN PRESENT: ICD-10-PCS | Mod: CPTII,S$GLB,, | Performed by: NURSE PRACTITIONER

## 2022-04-13 PROCEDURE — 1126F AMNT PAIN NOTED NONE PRSNT: CPT | Mod: CPTII,S$GLB,, | Performed by: NURSE PRACTITIONER

## 2022-04-13 PROCEDURE — 1160F RVW MEDS BY RX/DR IN RCRD: CPT | Mod: CPTII,S$GLB,, | Performed by: NURSE PRACTITIONER

## 2022-04-13 PROCEDURE — 3075F PR MOST RECENT SYSTOLIC BLOOD PRESS GE 130-139MM HG: ICD-10-PCS | Mod: CPTII,S$GLB,, | Performed by: NURSE PRACTITIONER

## 2022-04-13 PROCEDURE — 3078F DIAST BP <80 MM HG: CPT | Mod: CPTII,S$GLB,, | Performed by: NURSE PRACTITIONER

## 2022-04-13 RX ORDER — AZELASTINE 1 MG/ML
1 SPRAY, METERED NASAL 2 TIMES DAILY
Qty: 30 ML | Refills: 1 | Status: SHIPPED | OUTPATIENT
Start: 2022-04-13 | End: 2023-01-04

## 2022-04-13 RX ORDER — ALBUTEROL SULFATE 90 UG/1
2 AEROSOL, METERED RESPIRATORY (INHALATION) EVERY 6 HOURS PRN
Qty: 18 G | Refills: 0 | Status: SHIPPED | OUTPATIENT
Start: 2022-04-13 | End: 2023-01-04

## 2022-04-13 NOTE — PROGRESS NOTES
Subjective:       Patient ID: Sy Aguiar is a 66 y.o. male.    Vitals:  height is 6' (1.829 m) and weight is 133.8 kg (295 lb). His temperature is 98 °F (36.7 °C). His blood pressure is 139/74 and his pulse is 74. His respiration is 18 and oxygen saturation is 98%.     Chief Complaint: Cough and Medication Refill    Nasal congestion, tickle in throat, cough--- wheezing at night x 3 months.  Has appointment with ENT but cannot be seen until June 2022    Cough  This is a new (tickle in his throat thats causing a cough) problem. The current episode started more than 1 month ago (4 months). The problem has been unchanged. The problem occurs every few minutes. The cough is non-productive. Associated symptoms include postnasal drip and wheezing. Pertinent negatives include no chest pain, chills, ear pain, fever, headaches, nasal congestion or shortness of breath. Nothing aggravates the symptoms. He has tried OTC cough suppressant for the symptoms. The treatment provided mild relief. His past medical history is significant for bronchitis and environmental allergies. There is no history of asthma, COPD or pneumonia.       Constitution: Negative for chills, sweating, fatigue and fever.   HENT: Positive for congestion and postnasal drip. Negative for ear pain.    Cardiovascular: Negative for chest pain, leg swelling, palpitations and sob on exertion.   Respiratory: Positive for cough and wheezing. Negative for shortness of breath.    Allergic/Immunologic: Positive for environmental allergies and seasonal allergies.   Neurological: Negative for headaches.       Objective:      Physical Exam   Constitutional:  Non-toxic appearance. He does not appear ill. No distress.   HENT:   Nose: Mucosal edema, rhinorrhea and congestion present. No purulent discharge.   Mouth/Throat: Uvula is midline and mucous membranes are normal. No uvula swelling. Posterior oropharyngeal erythema present. No oropharyngeal exudate or posterior  oropharyngeal edema. No tonsillar exudate.   Cardiovascular: Normal rate and regular rhythm.   Pulmonary/Chest: Effort normal and breath sounds normal. No stridor. No respiratory distress.   Neurological: He is alert.   Skin: Skin is not diaphoretic and no rash.   Nursing note and vitals reviewed.            Assessment:       1. Allergic rhinitis, unspecified seasonality, unspecified trigger    2. Wheezes          Plan:         Allergic rhinitis, unspecified seasonality, unspecified trigger  -     azelastine (ASTELIN) 137 mcg (0.1 %) nasal spray; 1 spray (137 mcg total) by Nasal route 2 (two) times daily.  Dispense: 30 mL; Refill: 1    Wheezes  -     azelastine (ASTELIN) 137 mcg (0.1 %) nasal spray; 1 spray (137 mcg total) by Nasal route 2 (two) times daily.  Dispense: 30 mL; Refill: 1  -     albuterol (VENTOLIN HFA) 90 mcg/actuation inhaler; Inhale 2 puffs into the lungs every 6 (six) hours as needed for Wheezing. Rescue  Dispense: 18 g; Refill: 0

## 2022-04-13 NOTE — PATIENT INSTRUCTIONS
Return to Urgent Care or go to ER if symptoms worsen or fail to improve.  Follow up with PCP as recommended for further management.     THE FOLLOWING ARE RECOMMENDED TO HELP YOU MANAGE YOUR SYMPTOMS:    Use Nasal Saline Ocean Spray to relieve sinus congestion and pain. It is recommended that you use the nasal saline prior to using any topical nasal sprays to help clear the mucus so the medication is able to get to the mucus membranes.      Use Flonase or similar Over the Counter steroid nasal spray -- 1-2 sprays/nostril per day-- you may initially use it 2xs/day x 5 days to help with symptoms; then resume 1-2 sprays/nostril per day. Spray towards the outer side of the nose; do not spray straight back and do not spray to the center of the nose, as it will not work as effectively and may cause your nose to bleed.     Take Ibuprofen or Acetaminophen according to label instructions for fever, throat pain, headache, and body aches    Use warm salt water gargles to ease your throat pain. Warm salt water gargles as needed for sore throat-  1/2 tsp salt to 1 cup warm water, gargle as desired.

## 2022-04-18 ENCOUNTER — OFFICE VISIT (OUTPATIENT)
Dept: URGENT CARE | Facility: CLINIC | Age: 67
End: 2022-04-18
Payer: MEDICARE

## 2022-04-18 ENCOUNTER — INFUSION (OUTPATIENT)
Dept: INFECTIOUS DISEASES | Facility: HOSPITAL | Age: 67
End: 2022-04-18
Attending: INTERNAL MEDICINE
Payer: MEDICARE

## 2022-04-18 VITALS
OXYGEN SATURATION: 97 % | BODY MASS INDEX: 39.96 KG/M2 | HEART RATE: 63 BPM | WEIGHT: 295 LBS | SYSTOLIC BLOOD PRESSURE: 121 MMHG | HEIGHT: 72 IN | TEMPERATURE: 98 F | DIASTOLIC BLOOD PRESSURE: 69 MMHG | RESPIRATION RATE: 20 BRPM

## 2022-04-18 VITALS
HEIGHT: 73 IN | TEMPERATURE: 98 F | DIASTOLIC BLOOD PRESSURE: 81 MMHG | BODY MASS INDEX: 39.1 KG/M2 | WEIGHT: 295 LBS | HEART RATE: 58 BPM | OXYGEN SATURATION: 95 % | RESPIRATION RATE: 18 BRPM | SYSTOLIC BLOOD PRESSURE: 129 MMHG

## 2022-04-18 DIAGNOSIS — J34.9 SINUS PROBLEM: ICD-10-CM

## 2022-04-18 DIAGNOSIS — U07.1 COVID-19 VIRUS INFECTION: Primary | ICD-10-CM

## 2022-04-18 DIAGNOSIS — U07.1 COVID-19: Primary | ICD-10-CM

## 2022-04-18 DIAGNOSIS — R09.81 SINUS CONGESTION: ICD-10-CM

## 2022-04-18 DIAGNOSIS — U07.1 COVID-19 VIRUS DETECTED: ICD-10-CM

## 2022-04-18 LAB
CTP QC/QA: YES
SARS-COV-2 RDRP RESP QL NAA+PROBE: POSITIVE

## 2022-04-18 PROCEDURE — 1126F PR PAIN SEVERITY QUANTIFIED, NO PAIN PRESENT: ICD-10-PCS | Mod: CPTII,S$GLB,, | Performed by: NURSE PRACTITIONER

## 2022-04-18 PROCEDURE — 63600175 PHARM REV CODE 636 W HCPCS: Performed by: INTERNAL MEDICINE

## 2022-04-18 PROCEDURE — 3008F BODY MASS INDEX DOCD: CPT | Mod: CPTII,S$GLB,, | Performed by: NURSE PRACTITIONER

## 2022-04-18 PROCEDURE — M0222 HC IV INJECTION, BEBTELOVIMAB, INCL POST ADMIN MONIT: HCPCS | Performed by: INTERNAL MEDICINE

## 2022-04-18 PROCEDURE — U0002: ICD-10-PCS | Mod: QW,S$GLB,, | Performed by: NURSE PRACTITIONER

## 2022-04-18 PROCEDURE — 1160F PR REVIEW ALL MEDS BY PRESCRIBER/CLIN PHARMACIST DOCUMENTED: ICD-10-PCS | Mod: CPTII,S$GLB,, | Performed by: NURSE PRACTITIONER

## 2022-04-18 PROCEDURE — 3074F PR MOST RECENT SYSTOLIC BLOOD PRESSURE < 130 MM HG: ICD-10-PCS | Mod: CPTII,S$GLB,, | Performed by: NURSE PRACTITIONER

## 2022-04-18 PROCEDURE — 3074F SYST BP LT 130 MM HG: CPT | Mod: CPTII,S$GLB,, | Performed by: NURSE PRACTITIONER

## 2022-04-18 PROCEDURE — 3078F PR MOST RECENT DIASTOLIC BLOOD PRESSURE < 80 MM HG: ICD-10-PCS | Mod: CPTII,S$GLB,, | Performed by: NURSE PRACTITIONER

## 2022-04-18 PROCEDURE — 1160F RVW MEDS BY RX/DR IN RCRD: CPT | Mod: CPTII,S$GLB,, | Performed by: NURSE PRACTITIONER

## 2022-04-18 PROCEDURE — 3008F PR BODY MASS INDEX (BMI) DOCUMENTED: ICD-10-PCS | Mod: CPTII,S$GLB,, | Performed by: NURSE PRACTITIONER

## 2022-04-18 PROCEDURE — 99213 PR OFFICE/OUTPT VISIT, EST, LEVL III, 20-29 MIN: ICD-10-PCS | Mod: S$GLB,,, | Performed by: NURSE PRACTITIONER

## 2022-04-18 PROCEDURE — 1159F MED LIST DOCD IN RCRD: CPT | Mod: CPTII,S$GLB,, | Performed by: NURSE PRACTITIONER

## 2022-04-18 PROCEDURE — 4010F PR ACE/ARB THEARPY RXD/TAKEN: ICD-10-PCS | Mod: CPTII,S$GLB,, | Performed by: NURSE PRACTITIONER

## 2022-04-18 PROCEDURE — 3078F DIAST BP <80 MM HG: CPT | Mod: CPTII,S$GLB,, | Performed by: NURSE PRACTITIONER

## 2022-04-18 PROCEDURE — U0002 COVID-19 LAB TEST NON-CDC: HCPCS | Mod: QW,S$GLB,, | Performed by: NURSE PRACTITIONER

## 2022-04-18 PROCEDURE — 4010F ACE/ARB THERAPY RXD/TAKEN: CPT | Mod: CPTII,S$GLB,, | Performed by: NURSE PRACTITIONER

## 2022-04-18 PROCEDURE — 99213 OFFICE O/P EST LOW 20 MIN: CPT | Mod: S$GLB,,, | Performed by: NURSE PRACTITIONER

## 2022-04-18 PROCEDURE — 1159F PR MEDICATION LIST DOCUMENTED IN MEDICAL RECORD: ICD-10-PCS | Mod: CPTII,S$GLB,, | Performed by: NURSE PRACTITIONER

## 2022-04-18 PROCEDURE — 1126F AMNT PAIN NOTED NONE PRSNT: CPT | Mod: CPTII,S$GLB,, | Performed by: NURSE PRACTITIONER

## 2022-04-18 RX ORDER — ALBUTEROL SULFATE 90 UG/1
2 AEROSOL, METERED RESPIRATORY (INHALATION)
Status: ACTIVE | OUTPATIENT
Start: 2022-04-18 | End: 2022-04-21

## 2022-04-18 RX ORDER — EPINEPHRINE 0.3 MG/.3ML
0.3 INJECTION SUBCUTANEOUS
Status: ACTIVE | OUTPATIENT
Start: 2022-04-18 | End: 2022-04-21

## 2022-04-18 RX ORDER — DIPHENHYDRAMINE HYDROCHLORIDE 50 MG/ML
25 INJECTION INTRAMUSCULAR; INTRAVENOUS
Status: ACTIVE | OUTPATIENT
Start: 2022-04-18 | End: 2022-04-19

## 2022-04-18 RX ORDER — BEBTELOVIMAB 87.5 MG/ML
175 INJECTION, SOLUTION INTRAVENOUS
Status: COMPLETED | OUTPATIENT
Start: 2022-04-18 | End: 2022-04-18

## 2022-04-18 RX ORDER — ACETAMINOPHEN 325 MG/1
650 TABLET ORAL
Status: ACTIVE | OUTPATIENT
Start: 2022-04-18 | End: 2022-04-19

## 2022-04-18 RX ORDER — ONDANSETRON 4 MG/1
4 TABLET, ORALLY DISINTEGRATING ORAL
Status: ACTIVE | OUTPATIENT
Start: 2022-04-18 | End: 2022-04-19

## 2022-04-18 RX ADMIN — BEBTELOVIMAB 175 MG: 87.5 INJECTION, SOLUTION INTRAVENOUS at 02:04

## 2022-04-18 NOTE — PROGRESS NOTES
Bebtelovimab IV Injection administered. Pt tolerated injection well. No S/S of injection reaction noted at present time. One hour observation started.

## 2022-04-18 NOTE — PROGRESS NOTES
Patient arrives for Bebtelovimab IV Injection. Ambulatory. Pt AAox3. No distress noted. RR even and unlabored.     Symptoms and onset date:  Weakness and just feeling off. Onset 4/13/22.      Tested COVID + on 4/18/22.

## 2022-04-18 NOTE — PROGRESS NOTES
Subjective:       Patient ID: Sy Aguiar is a 66 y.o. male.    Vitals:  height is 6' (1.829 m) and weight is 133.8 kg (295 lb). His temperature is 98 °F (36.7 °C). His blood pressure is 121/69 and his pulse is 63. His respiration is 20 and oxygen saturation is 97%.     Chief Complaint: Sinus Problem    Sinus Problem  This is a new problem. The current episode started in the past 7 days (X3 DAYS). The problem is unchanged. Maximum temperature: UNMEASURED BUT FELT WARM. His pain is at a severity of 0/10. He is experiencing no pain. Associated symptoms include chills and congestion. Pertinent negatives include no headaches, hoarse voice or sneezing. Treatments tried: IBU. The treatment provided no relief.       Constitution: Positive for chills and generalized weakness.   HENT: Positive for congestion.    Allergic/Immunologic: Negative for sneezing.   Neurological: Negative for headaches.       Objective:      Physical Exam   Constitutional: He is oriented to person, place, and time. He appears well-developed. He is cooperative.  Non-toxic appearance. He does not appear ill. No distress.      Comments:Appears well  afebrile obesity  HENT:   Head: Normocephalic and atraumatic.   Ears:   Right Ear: Hearing and external ear normal.   Left Ear: Hearing and external ear normal.   Nose: Nose normal. No mucosal edema, rhinorrhea or nasal deformity. No epistaxis. Right sinus exhibits no maxillary sinus tenderness and no frontal sinus tenderness. Left sinus exhibits no maxillary sinus tenderness and no frontal sinus tenderness.   Mouth/Throat: Uvula is midline, oropharynx is clear and moist and mucous membranes are normal. No trismus in the jaw. Normal dentition. No uvula swelling. No posterior oropharyngeal erythema.   Eyes: Conjunctivae and lids are normal. Right eye exhibits no discharge. Left eye exhibits no discharge. No scleral icterus.   Neck: Trachea normal and phonation normal. Neck supple.   Cardiovascular:  Normal rate, regular rhythm, normal heart sounds and normal pulses.   Pulmonary/Chest: Effort normal and breath sounds normal. No respiratory distress.   Lungs clear bilaterally    Comments: Lungs clear bilaterally    Abdominal: Normal appearance and bowel sounds are normal. He exhibits no distension and no mass. Soft. There is no abdominal tenderness.   Musculoskeletal: Normal range of motion.         General: No deformity. Normal range of motion.   Neurological: He is alert and oriented to person, place, and time. He exhibits normal muscle tone. Coordination normal.   Skin: Skin is warm, dry, intact, not diaphoretic and not pale.   Psychiatric: His speech is normal and behavior is normal. Judgment and thought content normal.   Nursing note and vitals reviewed.        Results for orders placed or performed in visit on 04/18/22   POCT COVID-19 Rapid Screening   Result Value Ref Range    POC Rapid COVID Positive (A) Negative     Acceptable Yes      Assessment:       1. COVID-19 virus infection    2. Sinus problem    3. Sinus congestion          Plan:       3  COVID-19 virus infection  -     Ambulatory referral/consult to [facility]    Sinus problem  -     POCT COVID-19 Rapid Screening    Sinus congestion      Patient Instructions   Use an antihistamine such as Claritin, Zyrtec or Allegra to dry you out.     Use Flonase 1-2 sprays/nostril per day. It is a local acting steroid nasal spray, if you develop a bloody nose, stop using the medication immediately.    Use warm salt water gargles to ease your throat pain. Hot tea with honey.      A cool mist humidifier in bedroom may help with cough and relieve stuffy nose.     Over the counter you can use Tylenol (acetominophen) or Ibuprofen for your minor aches and pains as long as you have no contraindications.    Good nutrition. Lots of rest. Plenty of fluids     Must quarantine for 5 days from the start to symptoms    Referral sent for antibody  infusion    You must understand that you've received an Urgent Care treatment only and that you may be released before all your medical problems are known or treated. You, the patient, will arrange for follow up care as instructed.  Follow up with your PCP or specialty clinic as directed in the next 1-2 weeks if not improved or as needed.  You can call [phone] to schedule an appointment with the appropriate provider.  If your condition worsens we recommend that you receive another evaluation at the emergency room immediately or contact your primary medical clinics after hours call service to discuss your concerns.  Please return here or go to the Emergency Department for any concerns or worsening of condition.     Your test was POSITIVE for COVID-19 (coronavirus).       Please isolate yourself at home.  You may leave home and/or return to work once the following conditions are met:    If you were not hospitalized and are not moderately to severely immunocompromised:    More than 5 days since symptoms first appeared AND   More than 24 hours fever free without medications AND   Symptoms are improving   Continue to wear a mask around others for 5 additional days.    If you were hospitalized OR are moderately to severely immunocompromised:   More than 20 days since symptoms first appeared   More than 24 hours fever free without medications   Symptoms have improved    If you had no symptoms but tested positive:   More than 5 days since the date of the first positive test (20 days if moderately to severely immunocompromised). If you develop symptoms, then use the guidelines above.   Continue to wear a mask around others for 5 additional days.      Contact Tracing    As one of the next steps, you will receive a call or text from the [place] Department of Health ([facility]) COVID-19 Tracing Team. See the contact information below so you know not to ignore the health departments call. It is important that you  contact them back immediately so they can help.      Contact Tracer Number:  [phone]  Caller ID for most carriers: [facility]     What is contact tracing?  · Contact tracing is a process that helps identify everyone who has been in close contact with an infected person. Contact tracers let those people know they may have been exposed and guide them on next steps. Confidentiality is important for everyone; no one will be told who may have exposed them to the virus.  · Your involvement is important. The more we know about where and how this virus is spreading, the better chance we have at stopping it from spreading further.  What does exposure mean?  · Exposure means you have been within 6 feet for more than 15 minutes with a person who has or had COVID-19.  What kind of questions do the contact tracers ask?  · A contact tracer will confirm your basic contact information including name, address, phone number, and next of kin, as well as asking about any symptoms you may have had. Theyll also ask you how you think you may have gotten sick, such as places where you may have been exposed to the virus, and people you were with. Those names will never be shared with anyone outside of that call, and will only be used to help trace and stop the spread of the virus.   I have privacy concerns. How will the state use my information?  · Your privacy about your health is important. All calls are completed using call centers that use the appropriate health privacy protection measures (HIPAA compliance), meaning that your patient information is safe. No one will ever ask you any questions related to immigration status. Your health comes first.   Do I have to participate?  · You do not have to participate, but we strongly encourage you to. Contact tracing can help us catch and control new outbreaks as theyre developing to keep your friends and family safe.   What if I dont hear from anyone?  · If you dont receive a  call within 24 hours, you can call the number above right away to inquire about your status. That line is open from 8:00 am - 8:00 p.m., 7 days a week.  Contact tracing saves lives! Together, we have the power to beat this virus and keep our loved ones and neighbors safe.    For more information see CDC link below.      https://www.cdc.gov/coronavirus/2019-ncov/hcp/guidance-prevent-spread.html#precautions        Sources:  [facility], [place] [facility] and [facility]           Sincerely,     Nati Baum NP

## 2022-04-18 NOTE — PATIENT INSTRUCTIONS
Use an antihistamine such as Claritin, Zyrtec or Allegra to dry you out.     Use Flonase 1-2 sprays/nostril per day. It is a local acting steroid nasal spray, if you develop a bloody nose, stop using the medication immediately.    Use warm salt water gargles to ease your throat pain. Hot tea with honey.      A cool mist humidifier in bedroom may help with cough and relieve stuffy nose.     Over the counter you can use Tylenol (acetominophen) or Ibuprofen for your minor aches and pains as long as you have no contraindications.    Good nutrition. Lots of rest. Plenty of fluids     Must quarantine for 5 days from the start to symptoms    Referral sent for antibody infusion    You must understand that you've received an Urgent Care treatment only and that you may be released before all your medical problems are known or treated. You, the patient, will arrange for follow up care as instructed.  Follow up with your PCP or specialty clinic as directed in the next 1-2 weeks if not improved or as needed.  You can call [phone] to schedule an appointment with the appropriate provider.  If your condition worsens we recommend that you receive another evaluation at the emergency room immediately or contact your primary medical clinics after hours call service to discuss your concerns.  Please return here or go to the Emergency Department for any concerns or worsening of condition.     Your test was POSITIVE for COVID-19 (coronavirus).       Please isolate yourself at home.  You may leave home and/or return to work once the following conditions are met:    If you were not hospitalized and are not moderately to severely immunocompromised:   More than 5 days since symptoms first appeared AND  More than 24 hours fever free without medications AND  Symptoms are improving  Continue to wear a mask around others for 5 additional days.    If you were hospitalized OR are moderately to severely immunocompromised:  More than 20 days  since symptoms first appeared  More than 24 hours fever free without medications  Symptoms have improved    If you had no symptoms but tested positive:  More than 5 days since the date of the first positive test (20 days if moderately to severely immunocompromised). If you develop symptoms, then use the guidelines above.  Continue to wear a mask around others for 5 additional days.      Contact Tracing    As one of the next steps, you will receive a call or text from the [place] [facility] ([facility]) COVID-19 Tracing Team. See the contact information below so you know not to ignore the health departments call. It is important that you contact them back immediately so they can help.      Contact Tracer Number:  [phone]  Caller ID for most carriers: [place] Dept Health     What is contact tracing?  Contact tracing is a process that helps identify everyone who has been in close contact with an infected person. Contact tracers let those people know they may have been exposed and guide them on next steps. Confidentiality is important for everyone; no one will be told who may have exposed them to the virus.  Your involvement is important. The more we know about where and how this virus is spreading, the better chance we have at stopping it from spreading further.  What does exposure mean?  Exposure means you have been within 6 feet for more than 15 minutes with a person who has or had COVID-19.  What kind of questions do the contact tracers ask?  A contact tracer will confirm your basic contact information including name, address, phone number, and next of kin, as well as asking about any symptoms you may have had. Theyll also ask you how you think you may have gotten sick, such as places where you may have been exposed to the virus, and people you were with. Those names will never be shared with anyone outside of that call, and will only be used to help trace and stop the spread of the virus.   I have privacy  concerns. How will the state use my information?  Your privacy about your health is important. All calls are completed using call centers that use the appropriate health privacy protection measures (HIPAA compliance), meaning that your patient information is safe. No one will ever ask you any questions related to immigration status. Your health comes first.   Do I have to participate?  You do not have to participate, but we strongly encourage you to. Contact tracing can help us catch and control new outbreaks as theyre developing to keep your friends and family safe.   What if I dont hear from anyone?  If you dont receive a call within 24 hours, you can call the number above right away to inquire about your status. That line is open from 8:00 am - 8:00 p.m., 7 days a week.  Contact tracing saves lives! Together, we have the power to beat this virus and keep our loved ones and neighbors safe.    For more information see CDC link below.      https://www.cdc.gov/coronavirus/2019-ncov/hcp/guidance-prevent-spread.html#precautions        Sources:  CDC, [place] Department [facility] and [facility]           Sincerely,     Nati Baum NP

## 2022-04-18 NOTE — PROGRESS NOTES
Patient remains with no signs of complications noted. Patient received Bebtelovimab IV Injection according to FDA recommendations and [facility] SOP without complications noted and left with mask in place. Drug fact sheet provided. Pt discharged home. Ambulatory. Remains AAox3. No distress noted. RR even and unlabored.

## 2022-06-02 DIAGNOSIS — M25.562 LEFT KNEE PAIN, UNSPECIFIED CHRONICITY: ICD-10-CM

## 2022-06-02 DIAGNOSIS — M17.11 ARTHRITIS OF RIGHT KNEE: Primary | ICD-10-CM

## 2022-06-14 ENCOUNTER — HOSPITAL ENCOUNTER (OUTPATIENT)
Dept: RADIOLOGY | Facility: HOSPITAL | Age: 67
Discharge: HOME | End: 2022-06-14
Attending: ORTHOPAEDIC SURGERY
Payer: MEDICARE

## 2022-06-14 ENCOUNTER — RESEARCH ENCOUNTER (OUTPATIENT)
Dept: RESEARCH | Facility: HOSPITAL | Age: 67
End: 2022-06-14
Payer: MEDICARE

## 2022-06-14 ENCOUNTER — OFFICE VISIT (OUTPATIENT)
Dept: ORTHOPEDICS | Facility: CLINIC | Age: 67
End: 2022-06-14
Payer: MEDICARE

## 2022-06-14 VITALS
BODY MASS INDEX: 40.75 KG/M2 | DIASTOLIC BLOOD PRESSURE: 95 MMHG | HEIGHT: 73 IN | SYSTOLIC BLOOD PRESSURE: 151 MMHG | WEIGHT: 307.44 LBS

## 2022-06-14 DIAGNOSIS — M25.562 BILATERAL KNEE PAIN: ICD-10-CM

## 2022-06-14 DIAGNOSIS — M17.11 ARTHRITIS OF RIGHT KNEE: ICD-10-CM

## 2022-06-14 DIAGNOSIS — M25.561 BILATERAL KNEE PAIN: ICD-10-CM

## 2022-06-14 DIAGNOSIS — M17.12 PRIMARY OSTEOARTHRITIS OF LEFT KNEE: Primary | ICD-10-CM

## 2022-06-14 DIAGNOSIS — M25.562 CHRONIC PAIN OF LEFT KNEE: ICD-10-CM

## 2022-06-14 DIAGNOSIS — G89.29 CHRONIC PAIN OF LEFT KNEE: ICD-10-CM

## 2022-06-14 PROCEDURE — 1125F PR PAIN SEVERITY QUANTIFIED, PAIN PRESENT: ICD-10-PCS | Mod: CPTII,S$GLB,, | Performed by: ORTHOPAEDIC SURGERY

## 2022-06-14 PROCEDURE — 99214 PR OFFICE/OUTPT VISIT, EST, LEVL IV, 30-39 MIN: ICD-10-PCS | Mod: 25,S$GLB,, | Performed by: ORTHOPAEDIC SURGERY

## 2022-06-14 PROCEDURE — 1159F PR MEDICATION LIST DOCUMENTED IN MEDICAL RECORD: ICD-10-PCS | Mod: CPTII,S$GLB,, | Performed by: ORTHOPAEDIC SURGERY

## 2022-06-14 PROCEDURE — 3077F PR MOST RECENT SYSTOLIC BLOOD PRESSURE >= 140 MM HG: ICD-10-PCS | Mod: CPTII,S$GLB,, | Performed by: ORTHOPAEDIC SURGERY

## 2022-06-14 PROCEDURE — 3080F DIAST BP >= 90 MM HG: CPT | Mod: CPTII,S$GLB,, | Performed by: ORTHOPAEDIC SURGERY

## 2022-06-14 PROCEDURE — 77073 BONE LENGTH STUDIES: CPT | Mod: TC,FY

## 2022-06-14 PROCEDURE — 3077F SYST BP >= 140 MM HG: CPT | Mod: CPTII,S$GLB,, | Performed by: ORTHOPAEDIC SURGERY

## 2022-06-14 PROCEDURE — 20610 DRAIN/INJ JOINT/BURSA W/O US: CPT | Mod: LT,S$GLB,, | Performed by: ORTHOPAEDIC SURGERY

## 2022-06-14 PROCEDURE — 99214 OFFICE O/P EST MOD 30 MIN: CPT | Mod: 25,S$GLB,, | Performed by: ORTHOPAEDIC SURGERY

## 2022-06-14 PROCEDURE — 99999 PR PBB SHADOW E&M-EST. PATIENT-LVL III: ICD-10-PCS | Mod: PBBFAC,,, | Performed by: ORTHOPAEDIC SURGERY

## 2022-06-14 PROCEDURE — 4010F ACE/ARB THERAPY RXD/TAKEN: CPT | Mod: CPTII,S$GLB,, | Performed by: ORTHOPAEDIC SURGERY

## 2022-06-14 PROCEDURE — 3080F PR MOST RECENT DIASTOLIC BLOOD PRESSURE >= 90 MM HG: ICD-10-PCS | Mod: CPTII,S$GLB,, | Performed by: ORTHOPAEDIC SURGERY

## 2022-06-14 PROCEDURE — 20610 LARGE JOINT ASPIRATION/INJECTION: L KNEE: ICD-10-PCS | Mod: LT,S$GLB,, | Performed by: ORTHOPAEDIC SURGERY

## 2022-06-14 PROCEDURE — 77073 BONE LENGTH STUDIES: CPT | Mod: 26,,, | Performed by: RADIOLOGY

## 2022-06-14 PROCEDURE — 3288F PR FALLS RISK ASSESSMENT DOCUMENTED: ICD-10-PCS | Mod: CPTII,S$GLB,, | Performed by: ORTHOPAEDIC SURGERY

## 2022-06-14 PROCEDURE — 3288F FALL RISK ASSESSMENT DOCD: CPT | Mod: CPTII,S$GLB,, | Performed by: ORTHOPAEDIC SURGERY

## 2022-06-14 PROCEDURE — 3008F BODY MASS INDEX DOCD: CPT | Mod: CPTII,S$GLB,, | Performed by: ORTHOPAEDIC SURGERY

## 2022-06-14 PROCEDURE — 1101F PT FALLS ASSESS-DOCD LE1/YR: CPT | Mod: CPTII,S$GLB,, | Performed by: ORTHOPAEDIC SURGERY

## 2022-06-14 PROCEDURE — 3008F PR BODY MASS INDEX (BMI) DOCUMENTED: ICD-10-PCS | Mod: CPTII,S$GLB,, | Performed by: ORTHOPAEDIC SURGERY

## 2022-06-14 PROCEDURE — 73564 XR KNEE COMP 4 OR MORE VIEWS BILAT: ICD-10-PCS | Mod: 26,50,, | Performed by: RADIOLOGY

## 2022-06-14 PROCEDURE — 73564 X-RAY EXAM KNEE 4 OR MORE: CPT | Mod: TC,50,FY,59

## 2022-06-14 PROCEDURE — 99999 PR PBB SHADOW E&M-EST. PATIENT-LVL III: CPT | Mod: PBBFAC,,, | Performed by: ORTHOPAEDIC SURGERY

## 2022-06-14 PROCEDURE — 4010F PR ACE/ARB THEARPY RXD/TAKEN: ICD-10-PCS | Mod: CPTII,S$GLB,, | Performed by: ORTHOPAEDIC SURGERY

## 2022-06-14 PROCEDURE — 73564 X-RAY EXAM KNEE 4 OR MORE: CPT | Mod: 26,50,, | Performed by: RADIOLOGY

## 2022-06-14 PROCEDURE — 77073 XR HIP TO ANKLE: ICD-10-PCS | Mod: 26,,, | Performed by: RADIOLOGY

## 2022-06-14 PROCEDURE — 1159F MED LIST DOCD IN RCRD: CPT | Mod: CPTII,S$GLB,, | Performed by: ORTHOPAEDIC SURGERY

## 2022-06-14 PROCEDURE — 1125F AMNT PAIN NOTED PAIN PRSNT: CPT | Mod: CPTII,S$GLB,, | Performed by: ORTHOPAEDIC SURGERY

## 2022-06-14 PROCEDURE — 1101F PR PT FALLS ASSESS DOC 0-1 FALLS W/OUT INJ PAST YR: ICD-10-PCS | Mod: CPTII,S$GLB,, | Performed by: ORTHOPAEDIC SURGERY

## 2022-06-14 RX ORDER — KETOROLAC TROMETHAMINE 30 MG/ML
30 INJECTION, SOLUTION INTRAMUSCULAR; INTRAVENOUS
Status: DISCONTINUED | OUTPATIENT
Start: 2022-06-14 | End: 2022-06-14 | Stop reason: HOSPADM

## 2022-06-14 RX ORDER — LIDOCAINE HYDROCHLORIDE 10 MG/ML
4 INJECTION INFILTRATION; PERINEURAL
Status: DISCONTINUED | OUTPATIENT
Start: 2022-06-14 | End: 2022-06-14 | Stop reason: HOSPADM

## 2022-06-14 RX ORDER — BUPIVACAINE HYDROCHLORIDE 5 MG/ML
5 INJECTION, SOLUTION PERINEURAL
Status: DISCONTINUED | OUTPATIENT
Start: 2022-06-14 | End: 2022-06-14 | Stop reason: HOSPADM

## 2022-06-14 RX ADMIN — KETOROLAC TROMETHAMINE 30 MG: 30 INJECTION, SOLUTION INTRAMUSCULAR; INTRAVENOUS at 09:06

## 2022-06-14 RX ADMIN — LIDOCAINE HYDROCHLORIDE 4 ML: 10 INJECTION INFILTRATION; PERINEURAL at 09:06

## 2022-06-14 RX ADMIN — BUPIVACAINE HYDROCHLORIDE 5 ML: 5 INJECTION, SOLUTION PERINEURAL at 09:06

## 2022-06-14 NOTE — PROCEDURES
Large Joint Aspiration/Injection: L knee    Date/Time: 6/14/2022 9:00 AM  Performed by: Urbano Vigil MD  Authorized by: Urbano Vigil MD     Consent Done?:  Yes (Verbal)  Indications:  Arthritis  Site marked: the procedure site was marked    Timeout: prior to procedure the correct patient, procedure, and site was verified    Prep: patient was prepped and draped in usual sterile fashion      Local anesthesia used?: Yes    Local anesthetic:  Topical anesthetic and lidocaine 1% without epinephrine    Details:  Needle Size:  22 G  Ultrasonic Guidance for needle placement?: No    Approach:  Anteromedial  Location:  Knee  Site:  L knee  Medications:  4 mL LIDOcaine HCL 10 mg/ml (1%) 10 mg/mL (1 %); 5 mL BUPivacaine 0.5 % (5 mg/mL); 30 mg ketorolac 30 mg/mL (1 mL)  Patient tolerance:  Patient tolerated the procedure well with no immediate complications

## 2022-06-14 NOTE — PROGRESS NOTES
Protocol: innovations in [employer] Outcomes [employer] (Denice)  Protocol#: Denice  IRB#: 2021.156  Version Date: 18-Mar-2022  PI: Urbano Vigil MD  Patient Initials: DB     Informed Consent Process:     Research team discussed above mentioned protocol to patient.  Patient declined study at this time.

## 2022-06-14 NOTE — PROGRESS NOTES
Subjective:      Patient ID: Sy Aguiar is a 66 y.o. male.    Chief Complaint: Pain of the Left Knee and Pain of the Left Ankle      Patient ID: Sy Aguiar is a 66 y.o. male.    Chief Complaint: Pain of the Left Knee and Pain of the Left Ankle      KNEE PAIN: Complains of pain to the leftknee.   PAIN LOCATED: anterior and medial  ONSET: 3 years ago.  QUALITY:  Patient states the pain is worsening  HISTORY: Previous knee injury/surgery: no  Hx:   ASSOCIATED SYMPTOM AND TRIGGERS:Standing/Weightbearing, walking, trouble w stairs, stiffness, swelling, limping, stiffness w sitting   USES ASSISTIVE DEVICE: none  RELIEVED BY:rest, heat, ice, medication: Diclofenac used but not effective  PATIENT DENIES: bruising, redness, deformity         Social History     Occupational History    Not on file   Tobacco Use    Smoking status: Former Smoker     Types: Cigarettes    Smokeless tobacco: Never Used   Substance and Sexual Activity    Alcohol use: Yes     Comment: socially    Drug use: Never    Sexual activity: Not on file      Review of Systems   Constitutional: Negative for diaphoresis.   HENT: Negative for ear discharge, nosebleeds and stridor.    Eyes: Negative for photophobia.   Cardiovascular: Negative for syncope.   Respiratory: Negative for hemoptysis, shortness of breath and wheezing.    Neurological: Negative for tremors.   Psychiatric/Behavioral: Negative.          Objective:    General    Constitutional: He is oriented to person, place, and time. He appears well-developed and well-nourished.   HENT:   Head: Normocephalic and atraumatic.   Right Ear: External ear normal.   Left Ear: External ear normal.   Eyes: EOM are normal.   Pulmonary/Chest: Effort normal.   Neurological: He is alert and oriented to person, place, and time.   Psychiatric: He has a normal mood and affect. His behavior is normal. Judgment and thought content normal.     General Musculoskeletal Exam   Gait: antalgic and  abnormal         Left Knee Exam     Inspection   Swelling: present  Effusion: present    Tenderness   The patient tender to palpation of the medial joint line.    Crepitus   The patient has crepitus of the patella.    Range of Motion   Extension: 10   Flexion: 100     Tests   Stability   MCL - Valgus: normal (0 to 2mm)  LCL - Varus: normal (0 to 2mm)    Other   Sensation: normal    Muscle Strength   Left Lower Extremity   Quadriceps:  4/5   Hamstrin/5          Assessment:       1. Primary osteoarthritis of left knee    2. Chronic pain of left knee          Plan:       We had a lengthy discussion regarding the natural history of osteoarthritis.   The patient would like to continue nonoperative management, and I think that is Reasonable. The patient has severe bone on bone knee oa. KL score 4  We went ahead and injected the left  with 1 dose of ketorolac, Marcaine, and lidocaine. We reviewed the risks (incl side effects and allergies), benefits, of all treatment options including injections.   We will see the patient back on a p.r.n. basis as their symptoms dictate.  We also did begin discussing total knee arthroplasty. The patient was given educational literature regarding knee OA and total knee arthroplasty.

## 2022-10-11 ENCOUNTER — RESEARCH ENCOUNTER (OUTPATIENT)
Dept: RESEARCH | Facility: HOSPITAL | Age: 67
End: 2022-10-11
Payer: MEDICARE

## 2022-10-11 ENCOUNTER — OFFICE VISIT (OUTPATIENT)
Dept: ORTHOPEDICS | Facility: CLINIC | Age: 67
End: 2022-10-11
Payer: MEDICARE

## 2022-10-11 ENCOUNTER — HOSPITAL ENCOUNTER (OUTPATIENT)
Dept: RADIOLOGY | Facility: HOSPITAL | Age: 67
Discharge: HOME | End: 2022-10-11
Attending: ORTHOPAEDIC SURGERY
Payer: MEDICARE

## 2022-10-11 VITALS
BODY MASS INDEX: 36.89 KG/M2 | SYSTOLIC BLOOD PRESSURE: 142 MMHG | DIASTOLIC BLOOD PRESSURE: 87 MMHG | HEIGHT: 73 IN | WEIGHT: 278.31 LBS | HEART RATE: 54 BPM

## 2022-10-11 DIAGNOSIS — M62.81 QUADRICEPS WEAKNESS: ICD-10-CM

## 2022-10-11 DIAGNOSIS — G89.29 CHRONIC PAIN OF LEFT KNEE: Primary | ICD-10-CM

## 2022-10-11 DIAGNOSIS — M25.562 CHRONIC PAIN OF LEFT KNEE: Primary | ICD-10-CM

## 2022-10-11 DIAGNOSIS — M17.12 PRIMARY OSTEOARTHRITIS OF LEFT KNEE: ICD-10-CM

## 2022-10-11 DIAGNOSIS — F32.A DEPRESSION, UNSPECIFIED DEPRESSION TYPE: ICD-10-CM

## 2022-10-11 DIAGNOSIS — M25.562 CHRONIC PAIN OF LEFT KNEE: ICD-10-CM

## 2022-10-11 DIAGNOSIS — I10 HYPERTENSION, UNSPECIFIED TYPE: ICD-10-CM

## 2022-10-11 DIAGNOSIS — M17.11 ARTHRITIS OF RIGHT KNEE: ICD-10-CM

## 2022-10-11 DIAGNOSIS — G89.29 CHRONIC PAIN OF LEFT KNEE: ICD-10-CM

## 2022-10-11 PROCEDURE — 99215 PR OFFICE/OUTPT VISIT, EST, LEVL V, 40-54 MIN: ICD-10-PCS | Mod: S$GLB,,, | Performed by: ORTHOPAEDIC SURGERY

## 2022-10-11 PROCEDURE — 4010F ACE/ARB THERAPY RXD/TAKEN: CPT | Mod: CPTII,S$GLB,, | Performed by: ORTHOPAEDIC SURGERY

## 2022-10-11 PROCEDURE — 1101F PR PT FALLS ASSESS DOC 0-1 FALLS W/OUT INJ PAST YR: ICD-10-PCS | Mod: CPTII,S$GLB,, | Performed by: ORTHOPAEDIC SURGERY

## 2022-10-11 PROCEDURE — 3079F PR MOST RECENT DIASTOLIC BLOOD PRESSURE 80-89 MM HG: ICD-10-PCS | Mod: CPTII,S$GLB,, | Performed by: ORTHOPAEDIC SURGERY

## 2022-10-11 PROCEDURE — 1126F PR PAIN SEVERITY QUANTIFIED, NO PAIN PRESENT: ICD-10-PCS | Mod: CPTII,S$GLB,, | Performed by: ORTHOPAEDIC SURGERY

## 2022-10-11 PROCEDURE — 99215 OFFICE O/P EST HI 40 MIN: CPT | Mod: S$GLB,,, | Performed by: ORTHOPAEDIC SURGERY

## 2022-10-11 PROCEDURE — 1159F MED LIST DOCD IN RCRD: CPT | Mod: CPTII,S$GLB,, | Performed by: ORTHOPAEDIC SURGERY

## 2022-10-11 PROCEDURE — 3008F PR BODY MASS INDEX (BMI) DOCUMENTED: ICD-10-PCS | Mod: CPTII,S$GLB,, | Performed by: ORTHOPAEDIC SURGERY

## 2022-10-11 PROCEDURE — 3077F SYST BP >= 140 MM HG: CPT | Mod: CPTII,S$GLB,, | Performed by: ORTHOPAEDIC SURGERY

## 2022-10-11 PROCEDURE — 73562 X-RAY EXAM OF KNEE 3: CPT | Mod: TC,FY,LT

## 2022-10-11 PROCEDURE — 3288F FALL RISK ASSESSMENT DOCD: CPT | Mod: CPTII,S$GLB,, | Performed by: ORTHOPAEDIC SURGERY

## 2022-10-11 PROCEDURE — 3077F PR MOST RECENT SYSTOLIC BLOOD PRESSURE >= 140 MM HG: ICD-10-PCS | Mod: CPTII,S$GLB,, | Performed by: ORTHOPAEDIC SURGERY

## 2022-10-11 PROCEDURE — 73562 XR KNEE 3 VIEW LEFT: ICD-10-PCS | Mod: 26,LT,, | Performed by: RADIOLOGY

## 2022-10-11 PROCEDURE — 3079F DIAST BP 80-89 MM HG: CPT | Mod: CPTII,S$GLB,, | Performed by: ORTHOPAEDIC SURGERY

## 2022-10-11 PROCEDURE — 1126F AMNT PAIN NOTED NONE PRSNT: CPT | Mod: CPTII,S$GLB,, | Performed by: ORTHOPAEDIC SURGERY

## 2022-10-11 PROCEDURE — 4010F PR ACE/ARB THEARPY RXD/TAKEN: ICD-10-PCS | Mod: CPTII,S$GLB,, | Performed by: ORTHOPAEDIC SURGERY

## 2022-10-11 PROCEDURE — 73562 X-RAY EXAM OF KNEE 3: CPT | Mod: 26,LT,, | Performed by: RADIOLOGY

## 2022-10-11 PROCEDURE — 1159F PR MEDICATION LIST DOCUMENTED IN MEDICAL RECORD: ICD-10-PCS | Mod: CPTII,S$GLB,, | Performed by: ORTHOPAEDIC SURGERY

## 2022-10-11 PROCEDURE — 99999 PR PBB SHADOW E&M-EST. PATIENT-LVL V: CPT | Mod: PBBFAC,,, | Performed by: ORTHOPAEDIC SURGERY

## 2022-10-11 PROCEDURE — 3008F BODY MASS INDEX DOCD: CPT | Mod: CPTII,S$GLB,, | Performed by: ORTHOPAEDIC SURGERY

## 2022-10-11 PROCEDURE — 1101F PT FALLS ASSESS-DOCD LE1/YR: CPT | Mod: CPTII,S$GLB,, | Performed by: ORTHOPAEDIC SURGERY

## 2022-10-11 PROCEDURE — 3288F PR FALLS RISK ASSESSMENT DOCUMENTED: ICD-10-PCS | Mod: CPTII,S$GLB,, | Performed by: ORTHOPAEDIC SURGERY

## 2022-10-11 PROCEDURE — 99999 PR PBB SHADOW E&M-EST. PATIENT-LVL V: ICD-10-PCS | Mod: PBBFAC,,, | Performed by: ORTHOPAEDIC SURGERY

## 2022-10-11 NOTE — PROGRESS NOTES
Subjective:      Patient ID: Sy Aguiar is a 66 y.o. male.    Chief Complaint: Pain of the Left Knee    Knee Pain: left  swelling: yes  worsens with activity: yes  relieved by: injections       Social History     Occupational History    Not on file   Tobacco Use    Smoking status: Former     Types: Cigarettes    Smokeless tobacco: Never   Substance and Sexual Activity    Alcohol use: Yes     Comment: socially    Drug use: Never    Sexual activity: Not on file      ROS      Objective:    Ortho/SPM Exam       Assessment:       1. Chronic pain of left knee    2. Primary osteoarthritis of left knee    3. Quadriceps weakness    4. Hypertension, unspecified type    5. Depression, unspecified depression type          Plan:       The patient has failed non operative management, has painful crepitus, and has swelling. The natural history of severe degenerative arthritis was discussed at length and nonoperative and operative treatment was reviewed. Due to the pain and associated disability, I recommend left total knee replacement for KL 4.  The risks, benefits, convalescence, and alternatives were reviewed.  Numerous questions were asked and answered.  Models were used as an education tool.  Surgery will be scheduled at a convenient time.  The discussion with the patient included a description of a total knee replacement.  A total knee replacement (TKR) means a resurfacing of all three surfaces-the patella, femur, and tibia, with metal and plastic parts. The prosthetic parts are usually cemented into position and will allow a patient a full range of motion from. The postoperative motion, however, is determined by multiple factors, the most important of which is preoperative motion. In general, the better the motion preoperatively, the better the motion postoperatively.  In patients with good bone stock and bone quality (ie males, younger patients) I will generally use an uncemented tibial component and leave the  patella unresurfaced, in an effort to preserve bone stock for any future revision surgeries. In those patients we discuss the risk of anterior knee pain in a small subset of patients (5-10%) with an unresurfaced patella. The operation, pending medical clearance, generally requires a hospitalization of 0-3 days for one knee (possibly longer for a bilateral replacement). In general, we prefer to perform the procedure under epidural/spinal anesthesia.  Patient blood donation will be based on the individual patient but is not common and based on preoperative hemoglobin/hematocrit levels.The operation will be done preferably in a minimally invasive fashion which will facilitate recovery.  While performing the procedure in a minimally invasive manner is our preference, some patients are not candidates.  In that situation, a non-minimally invasive technique will be performed.  This will not adversely affect the long term success of the TKR.  Postoperatively, the patient is  walking the day of surgery and may be discahrged the day of surgery if stable with a walker or cane.  The first couple of days can be painful and the pain medication will alleviate but not eliminate the pain.  Thus, the patient must really push hard to get the range of motion.   The cane is to be dispensed with once the patient is secure enough.  In general, there is no cast or brace required with a routine knee replacement. In the long term, we do not encourage our patients to run for the sake of running; although, pending their preoperative status, we often allow patients to play doubles tennis or comparable activities.  We also allow them to do gentle intermediate downhill skiing if they are truly an expert skier.  Biking is encouraged as well as swimming.  The follow-up periods are usually at 2 weeks, 3 months, six months, and yearly intervals.  Potential complications with total knee replacements include infection (less than 2%), which is much  higher in patients with obesity, diabetes, or other conditions which adversely affect the immune system.  (Patients with a previous history of osteomyelitis can have infection rates as high as 15%).  By nerve damage we mean a peroneal nerve palsy with a foot drop (a flapping foot with ambulation).  This is particularly more apt to occur with a bad valgus (knock knee) deformity.  The incidence can be quite high in this particular patient population.  There are always areas of skin numbness, but this is not an untoward effect, nor do we consider it a complication.  Other potential complications include dislocation of the patellar component (less than 2%).  The loosening of either the tibial or femoral component is much more infrequent.  It usually occurs with infection or long-term use in a patient population that is at extreme risk (e.g., markedly overweight and not conscientious about their exercises).  Major blood vessel damage is also extremely rare.  Theoretically, because of the anatomic proximity of the popliteal artery, it could be lacerated with subsequent repairs required.  Albeit unlikely, a disruption of the popliteal artery could theoretically result in an amputation.  Similarly, infection could theoretically result in an amputation if one were to grow out an organism that cannot be controlled with antibiotics.  General medical complications include phlebitis for which we prophylactically anticoagulate, but it could still occur and fatal pulmonary embolus has been reported.  Cardiovascular problems, such as a heart attack or ischemia, are always a concern with such hemodynamic changes in the blood vascular system.  Our goal is to improve at least 80% of the pain and hopefully 100% but some aspects of the patients anatomy or other factors may not provide complete pain relief. Other general complications are very rare but in medicine anything could theoretically happen. We also discussed performing a  pre-operative peripheral sensory block of the bracnhes of the AFCN and ISN of the same knee using iovera to help with pain control post surgery. This is a relatively new technology with early data demonstrating significant pain improvement and functional recovery. However the science defining all the associated risks is still developing. From what we know thus far, a small number of patients can have nerve pain along the areas of the treated nerves. I think the patient understands the risk benefit ratio of total knee replacement and the iovera treatment and would like to pursue the total knee replacement and iovera treatment. We will also use the BeeFirst.in NMES device with garment prescribed for disuse atrophy as part of rehabilitation program to restore strength. Due to the large surface area and frequency of treatments, it is not feasible to use conventional electrodes, requiring the use of a conductive garment. This device is being used in conjunction with physical therapy. we will begin the pre-operative process.

## 2022-10-11 NOTE — PROGRESS NOTES
Protocol: innovations in Genicular Outcomes Registry (Denice)  Protocol#: Denice  IRB#: 2021.156  Version Date: 18-Mar-2022  PI: Urbano Vigil MD  Patient Initials: D.B.     Informed Consent Process/Declined Study     Patient again approached to discuss possible participation in research study. After discussion, patient declined the study again. Patient will be getting TKA sometime in January 2023.

## 2022-11-17 ENCOUNTER — CLINICAL SUPPORT (OUTPATIENT)
Dept: LAB | Facility: HOSPITAL | Age: 67
End: 2022-11-17
Attending: ORTHOPAEDIC SURGERY
Payer: MEDICARE

## 2022-11-17 ENCOUNTER — HOSPITAL ENCOUNTER (OUTPATIENT)
Dept: RADIOLOGY | Facility: HOSPITAL | Age: 67
Discharge: HOME | End: 2022-11-17
Attending: ORTHOPAEDIC SURGERY
Payer: MEDICARE

## 2022-11-17 DIAGNOSIS — I10 HYPERTENSION, UNSPECIFIED TYPE: ICD-10-CM

## 2022-11-17 DIAGNOSIS — M62.81 QUADRICEPS WEAKNESS: ICD-10-CM

## 2022-11-17 DIAGNOSIS — M17.12 PRIMARY OSTEOARTHRITIS OF LEFT KNEE: ICD-10-CM

## 2022-11-17 DIAGNOSIS — M25.562 CHRONIC PAIN OF LEFT KNEE: ICD-10-CM

## 2022-11-17 DIAGNOSIS — G89.29 CHRONIC PAIN OF LEFT KNEE: ICD-10-CM

## 2022-11-17 PROCEDURE — 71045 XR CHEST 1 VIEW PRE-OP: ICD-10-PCS | Mod: 26,,, | Performed by: RADIOLOGY

## 2022-11-17 PROCEDURE — 71045 X-RAY EXAM CHEST 1 VIEW: CPT | Mod: 26,,, | Performed by: RADIOLOGY

## 2022-11-17 PROCEDURE — 77073 BONE LENGTH STUDIES: CPT | Mod: 26,,, | Performed by: RADIOLOGY

## 2022-11-17 PROCEDURE — 93010 ELECTROCARDIOGRAM REPORT: CPT | Mod: ,,, | Performed by: INTERNAL MEDICINE

## 2022-11-17 PROCEDURE — 93005 ELECTROCARDIOGRAM TRACING: CPT

## 2022-11-17 PROCEDURE — 71045 X-RAY EXAM CHEST 1 VIEW: CPT | Mod: TC,FY

## 2022-11-17 PROCEDURE — 77073 BONE LENGTH STUDIES: CPT | Mod: TC,FY

## 2022-11-17 PROCEDURE — 77073 XR HIP TO ANKLE: ICD-10-PCS | Mod: 26,,, | Performed by: RADIOLOGY

## 2022-11-17 PROCEDURE — 93010 EKG 12-LEAD: ICD-10-PCS | Mod: ,,, | Performed by: INTERNAL MEDICINE

## 2022-12-28 DIAGNOSIS — R52 PAIN: Primary | ICD-10-CM

## 2023-01-03 ENCOUNTER — PATIENT MESSAGE (OUTPATIENT)
Dept: RESEARCH | Facility: HOSPITAL | Age: 68
End: 2023-01-03
Payer: MEDICARE

## 2023-01-03 ENCOUNTER — OFFICE VISIT (OUTPATIENT)
Dept: ORTHOPEDICS | Facility: CLINIC | Age: 68
End: 2023-01-03
Payer: MEDICARE

## 2023-01-03 ENCOUNTER — HOSPITAL ENCOUNTER (OUTPATIENT)
Dept: RADIOLOGY | Facility: HOSPITAL | Age: 68
Discharge: HOME | End: 2023-01-03
Attending: ORTHOPAEDIC SURGERY
Payer: MEDICARE

## 2023-01-03 VITALS
DIASTOLIC BLOOD PRESSURE: 70 MMHG | BODY MASS INDEX: 37.37 KG/M2 | SYSTOLIC BLOOD PRESSURE: 119 MMHG | HEART RATE: 90 BPM | HEIGHT: 73 IN | WEIGHT: 281.94 LBS

## 2023-01-03 DIAGNOSIS — R52 PAIN: ICD-10-CM

## 2023-01-03 DIAGNOSIS — M76.821 INSUFFICIENCY OF RIGHT POSTERIOR TIBIAL TENDON: ICD-10-CM

## 2023-01-03 DIAGNOSIS — M75.41 IMPINGEMENT SYNDROME OF RIGHT SHOULDER: Primary | ICD-10-CM

## 2023-01-03 PROCEDURE — 99213 PR OFFICE/OUTPT VISIT, EST, LEVL III, 20-29 MIN: ICD-10-PCS | Mod: 25,S$GLB,, | Performed by: ORTHOPAEDIC SURGERY

## 2023-01-03 PROCEDURE — 1160F RVW MEDS BY RX/DR IN RCRD: CPT | Mod: CPTII,S$GLB,, | Performed by: ORTHOPAEDIC SURGERY

## 2023-01-03 PROCEDURE — 3008F BODY MASS INDEX DOCD: CPT | Mod: CPTII,S$GLB,, | Performed by: ORTHOPAEDIC SURGERY

## 2023-01-03 PROCEDURE — 73030 PR  X-RAY SHOULDER 2+ VW: ICD-10-PCS | Mod: 26,LT,, | Performed by: STUDENT IN AN ORGANIZED HEALTH CARE EDUCATION/TRAINING PROGRAM

## 2023-01-03 PROCEDURE — 3288F FALL RISK ASSESSMENT DOCD: CPT | Mod: CPTII,S$GLB,, | Performed by: ORTHOPAEDIC SURGERY

## 2023-01-03 PROCEDURE — 99213 OFFICE O/P EST LOW 20 MIN: CPT | Mod: 25,S$GLB,, | Performed by: ORTHOPAEDIC SURGERY

## 2023-01-03 PROCEDURE — 20610 LARGE JOINT ASPIRATION/INJECTION: R SUBACROMIAL BURSA: ICD-10-PCS | Mod: RT,S$GLB,, | Performed by: ORTHOPAEDIC SURGERY

## 2023-01-03 PROCEDURE — 3074F SYST BP LT 130 MM HG: CPT | Mod: CPTII,S$GLB,, | Performed by: ORTHOPAEDIC SURGERY

## 2023-01-03 PROCEDURE — 3074F PR MOST RECENT SYSTOLIC BLOOD PRESSURE < 130 MM HG: ICD-10-PCS | Mod: CPTII,S$GLB,, | Performed by: ORTHOPAEDIC SURGERY

## 2023-01-03 PROCEDURE — 3078F PR MOST RECENT DIASTOLIC BLOOD PRESSURE < 80 MM HG: ICD-10-PCS | Mod: CPTII,S$GLB,, | Performed by: ORTHOPAEDIC SURGERY

## 2023-01-03 PROCEDURE — 3078F DIAST BP <80 MM HG: CPT | Mod: CPTII,S$GLB,, | Performed by: ORTHOPAEDIC SURGERY

## 2023-01-03 PROCEDURE — 3288F PR FALLS RISK ASSESSMENT DOCUMENTED: ICD-10-PCS | Mod: CPTII,S$GLB,, | Performed by: ORTHOPAEDIC SURGERY

## 2023-01-03 PROCEDURE — 73030 X-RAY EXAM OF SHOULDER: CPT | Mod: 26,RT,, | Performed by: STUDENT IN AN ORGANIZED HEALTH CARE EDUCATION/TRAINING PROGRAM

## 2023-01-03 PROCEDURE — 20610 DRAIN/INJ JOINT/BURSA W/O US: CPT | Mod: RT,S$GLB,, | Performed by: ORTHOPAEDIC SURGERY

## 2023-01-03 PROCEDURE — 3008F PR BODY MASS INDEX (BMI) DOCUMENTED: ICD-10-PCS | Mod: CPTII,S$GLB,, | Performed by: ORTHOPAEDIC SURGERY

## 2023-01-03 PROCEDURE — 73030 X-RAY EXAM OF SHOULDER: CPT | Mod: 26,LT,, | Performed by: STUDENT IN AN ORGANIZED HEALTH CARE EDUCATION/TRAINING PROGRAM

## 2023-01-03 PROCEDURE — 99999 PR PBB SHADOW E&M-EST. PATIENT-LVL III: CPT | Mod: PBBFAC,,, | Performed by: ORTHOPAEDIC SURGERY

## 2023-01-03 PROCEDURE — 1125F AMNT PAIN NOTED PAIN PRSNT: CPT | Mod: CPTII,S$GLB,, | Performed by: ORTHOPAEDIC SURGERY

## 2023-01-03 PROCEDURE — 1101F PT FALLS ASSESS-DOCD LE1/YR: CPT | Mod: CPTII,S$GLB,, | Performed by: ORTHOPAEDIC SURGERY

## 2023-01-03 PROCEDURE — 1101F PR PT FALLS ASSESS DOC 0-1 FALLS W/OUT INJ PAST YR: ICD-10-PCS | Mod: CPTII,S$GLB,, | Performed by: ORTHOPAEDIC SURGERY

## 2023-01-03 PROCEDURE — 1160F PR REVIEW ALL MEDS BY PRESCRIBER/CLIN PHARMACIST DOCUMENTED: ICD-10-PCS | Mod: CPTII,S$GLB,, | Performed by: ORTHOPAEDIC SURGERY

## 2023-01-03 PROCEDURE — 1159F PR MEDICATION LIST DOCUMENTED IN MEDICAL RECORD: ICD-10-PCS | Mod: CPTII,S$GLB,, | Performed by: ORTHOPAEDIC SURGERY

## 2023-01-03 PROCEDURE — 99999 PR PBB SHADOW E&M-EST. PATIENT-LVL III: ICD-10-PCS | Mod: PBBFAC,,, | Performed by: ORTHOPAEDIC SURGERY

## 2023-01-03 PROCEDURE — 73030 X-RAY EXAM OF SHOULDER: CPT | Mod: TC,50,PN

## 2023-01-03 PROCEDURE — 1125F PR PAIN SEVERITY QUANTIFIED, PAIN PRESENT: ICD-10-PCS | Mod: CPTII,S$GLB,, | Performed by: ORTHOPAEDIC SURGERY

## 2023-01-03 PROCEDURE — 1159F MED LIST DOCD IN RCRD: CPT | Mod: CPTII,S$GLB,, | Performed by: ORTHOPAEDIC SURGERY

## 2023-01-03 RX ORDER — METHOCARBAMOL 500 MG/1
500 TABLET, FILM COATED ORAL 2 TIMES DAILY PRN
COMMUNITY
Start: 2022-12-13 | End: 2023-01-04

## 2023-01-03 RX ORDER — LORAZEPAM 0.5 MG/1
0.5 TABLET ORAL EVERY 6 HOURS PRN
COMMUNITY
Start: 2022-08-31 | End: 2023-01-04

## 2023-01-03 RX ORDER — SERTRALINE HYDROCHLORIDE 50 MG/1
50 TABLET, FILM COATED ORAL
COMMUNITY
Start: 2022-12-25 | End: 2023-01-04

## 2023-01-03 RX ORDER — CHLORHEXIDINE GLUCONATE ORAL RINSE 1.2 MG/ML
SOLUTION DENTAL
COMMUNITY
Start: 2022-11-19 | End: 2023-01-04

## 2023-01-03 RX ADMIN — TRIAMCINOLONE ACETONIDE 40 MG: 40 INJECTION, SUSPENSION INTRA-ARTICULAR; INTRAMUSCULAR at 03:01

## 2023-01-03 NOTE — PROGRESS NOTES
Patient ID:   Sy Aguiar is a 67 y.o. male.    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  He is presenting today with bilateral shoulder pain.  Pain level is 10/10.  The pain is worse on the right side compared to the left side.  He reports chronic issues with both shoulders but recently the pain has worsened over the last several weeks.  He describes the pain as a clipping pain.  He reports pain with activity.  In addition to the shoulder he reports having some pain right ankle.    Medications:    Current Outpatient Medications:     acetaminophen (TYLENOL) 325 MG tablet, Take 1 tablet by mouth every 4 hours for 14 days, Disp: 84 tablet, Rfl: 0    albuterol (VENTOLIN HFA) 90 mcg/actuation inhaler, Inhale 2 puffs into the lungs every 6 (six) hours as needed for Wheezing. Rescue, Disp: 1 Inhaler, Rfl: 0    albuterol (VENTOLIN HFA) 90 mcg/actuation inhaler, Inhale 2 puffs into the lungs every 6 (six) hours as needed for Wheezing. Rescue, Disp: 18 g, Rfl: 0    azelastine (ASTELIN) 137 mcg (0.1 %) nasal spray, 1 spray (137 mcg total) by Nasal route 2 (two) times daily., Disp: 30 mL, Rfl: 1    azithromycin (ZITHROMAX Z-CORNELIO) 250 MG tablet, Take 2 po now  then 1 daily x 4 days (Patient taking differently: Take 2 po now  then 1 daily x 4 days), Disp: 6 tablet, Rfl: 0    chlorhexidine (PERIDEX) 0.12 % solution, SMARTSIG:By Mouth, Disp: , Rfl:     guaiFENesin (MUCINEX) 600 mg 12 hr tablet, Take 1 tablet (600 mg total) by mouth 2 (two) times daily., Disp: 30 tablet, Rfl: 0    hydrOXYzine HCl (ATARAX) 25 MG tablet, Take 25 mg by mouth nightly as needed., Disp: , Rfl: 5    LORazepam (ATIVAN) 0.5 MG tablet, Take 0.5 mg by mouth every 6 (six) hours as needed., Disp: , Rfl:     losartan (COZAAR) 50 MG tablet, Take 50 mg by mouth., Disp: , Rfl:     melatonin (MELATIN), Take 3 mg by mouth., Disp: , Rfl:     meloxicam (MOBIC) 15 MG tablet,  "Take 15 mg by mouth once daily., Disp: , Rfl:     methocarbamoL (ROBAXIN) 500 MG Tab, Take 500 mg by mouth 2 (two) times daily as needed., Disp: , Rfl:     sertraline (ZOLOFT) 50 MG tablet, Take 50 mg by mouth., Disp: , Rfl:     sildenafil (VIAGRA) 100 MG tablet, , Disp: , Rfl: 0    aspirin (ECOTRIN) 81 MG EC tablet, Take 1 tablet (81 mg total) by mouth 2 (two) times daily., Disp: 84 tablet, Rfl: 0    cetirizine (ZYRTEC) 5 MG tablet, Take 1 tablet (5 mg total) by mouth once daily., Disp: 30 tablet, Rfl: 0    famotidine (PEPCID) 20 MG tablet, Take 1 tablet (20 mg total) by mouth 2 (two) times daily., Disp: 84 tablet, Rfl: 0  No current facility-administered medications for this visit.    Facility-Administered Medications Ordered in Other Visits:     lactated ringers infusion, , Intravenous, Continuous, Juanita Zhong NP    lidocaine (PF) 10 mg/ml (1%) injection 10 mg, 1 mL, Intradermal, Once, Juanita Zhong NP    Allergies:  Review of patient's allergies indicates:  No Known Allergies    Past Medical History:  Past Medical History:   Diagnosis Date    Asthma     Hypertension         Past Surgical History:  Past Surgical History:   Procedure Laterality Date    BACK SURGERY      ELBOW SURGERY Right     KNEE SURGERY      quad muscle tear repair       Social History:  Social History     Occupational History    Not on file   Tobacco Use    Smoking status: Former     Types: Cigarettes    Smokeless tobacco: Never   Substance and Sexual Activity    Alcohol use: Yes     Comment: socially    Drug use: Never    Sexual activity: Not on file       Family History:  History reviewed. No pertinent family history.     ROS:  Review of Systems   Musculoskeletal:  Positive for arthritis, joint pain, muscle weakness, myalgias and stiffness.   All other systems reviewed and are negative.    Vitals:  /70   Pulse 90   Ht 6' 1" (1.854 m)   Wt 127.9 kg (281 lb 15.5 oz)   BMI 37.20 kg/m²     Physical Examination:  Comprehensive " Orthopaedic Musculoskeletal Exam    General      Constitutional: appears stated age, moderately obese, well-developed and well-nourished    Scleral icterus: no    Labored breathing: no    Psychiatric: normal mood and affect and no acute distress    Neurological: alert and oriented x3    Skin: intact    Lymphadenopathy: none   Ortho Exam   Right shoulder exam:  Upon inspection of the right shoulder, he has a protracted posture.  No AC joint tenderness.  Tenderness present over the anterolateral shoulder.  Range of motion reveals active elevation 170, external rotation 20.  Rotator cuff strength exam reveals 4+ out of 5 elevation, 4/5 external rotation, 5/5 internal rotation.  Mild pain with Neer and Lester impingement maneuvers.    Left shoulder exam:  Upon inspection of the left shoulder, he has protracted posture.  No AC joint tenderness.  Nontender to palpation.  Range of motion reveals active elevation 170, external rotation 20.  Rotator cuff strength is 5/5 in elevation, external rotation, internal rotation.  Negative Neer and Lester impingement maneuvers.    Right lower extremity exam:   Patient has positive too many toes sign on the right with what appears to be an acquired pes planus deformity of the foot.  He does have decreased subtalar motion but some motion still present.    Imaging:  I have ordered and independently reviewed the following imaging studies performed at [facility] today    X-Ray Shoulder 2 or more views Bilat  Narrative: EXAMINATION:  XR SHOULDER COMPLETE 2 OR MORE VIEWS BILATERAL    CLINICAL HISTORY:  Pain, unspecified    TECHNIQUE:  Three views of the bilateral shoulders    COMPARISON:  None    FINDINGS:  No acute fracture, dislocation, or osseous destruction.  Mild degenerative changes of the bilateral glenohumeral joints.  Punctate hyperdensity projected between the left humeral head and acromion which may represent calcific tendinitis.  Impression: As above.    Electronically signed  by: Max Og  Date:    01/03/2023  Time:    15:34    Assessment:  1. Impingement syndrome of right shoulder    2. Insufficiency of right posterior tibial tendon        Plan:  The patient is planning to have total knee arthroplasty next week.  I discussed different options with him.  I think at this stage pretty much the only option for the shoulder me to give him a cortisone injection and got a try to get him some pain relief.  We will see how he does with this.  If he continues to have significant amount of some pain then I would request that he undergo physical therapy and probably an MRI.  In regards to his apparent posterior tibial insufficiency, I have recommended a medial longitudinal arch support.  Orders Placed This Encounter    HME - OTHER     No follow-ups on file.

## 2023-01-04 ENCOUNTER — ANESTHESIA EVENT (OUTPATIENT)
Dept: SURGERY | Facility: HOSPITAL | Age: 68
End: 2023-01-04
Payer: MEDICARE

## 2023-01-04 ENCOUNTER — OFFICE VISIT (OUTPATIENT)
Dept: FAMILY MEDICINE | Facility: HOSPITAL | Age: 68
End: 2023-01-04
Attending: ORTHOPAEDIC SURGERY
Payer: MEDICARE

## 2023-01-04 ENCOUNTER — HOSPITAL ENCOUNTER (OUTPATIENT)
Dept: PREADMISSION TESTING | Facility: HOSPITAL | Age: 68
Discharge: HOME | End: 2023-01-04
Attending: ORTHOPAEDIC SURGERY
Payer: MEDICARE

## 2023-01-04 VITALS
HEART RATE: 61 BPM | WEIGHT: 281.75 LBS | RESPIRATION RATE: 16 BRPM | DIASTOLIC BLOOD PRESSURE: 69 MMHG | SYSTOLIC BLOOD PRESSURE: 132 MMHG | BODY MASS INDEX: 37.34 KG/M2 | TEMPERATURE: 98 F | OXYGEN SATURATION: 97 % | HEIGHT: 73 IN

## 2023-01-04 VITALS
DIASTOLIC BLOOD PRESSURE: 69 MMHG | HEIGHT: 73 IN | BODY MASS INDEX: 37.31 KG/M2 | SYSTOLIC BLOOD PRESSURE: 123 MMHG | HEART RATE: 52 BPM | WEIGHT: 281.5 LBS

## 2023-01-04 DIAGNOSIS — F32.A DEPRESSION, UNSPECIFIED DEPRESSION TYPE: Primary | ICD-10-CM

## 2023-01-04 PROCEDURE — 99214 OFFICE O/P EST MOD 30 MIN: CPT

## 2023-01-04 RX ORDER — SERTRALINE HYDROCHLORIDE 50 MG/1
50 TABLET, FILM COATED ORAL DAILY
COMMUNITY
End: 2023-01-04 | Stop reason: SDUPTHER

## 2023-01-04 RX ORDER — TRAZODONE HYDROCHLORIDE 50 MG/1
50 TABLET ORAL NIGHTLY
Qty: 30 TABLET | Refills: 3 | Status: SHIPPED | OUTPATIENT
Start: 2023-01-04

## 2023-01-04 RX ORDER — SODIUM CHLORIDE, SODIUM LACTATE, POTASSIUM CHLORIDE, CALCIUM CHLORIDE 600; 310; 30; 20 MG/100ML; MG/100ML; MG/100ML; MG/100ML
INJECTION, SOLUTION INTRAVENOUS CONTINUOUS
Status: CANCELLED | OUTPATIENT
Start: 2023-01-04

## 2023-01-04 RX ORDER — LIDOCAINE HYDROCHLORIDE 10 MG/ML
1 INJECTION, SOLUTION EPIDURAL; INFILTRATION; INTRACAUDAL; PERINEURAL ONCE
Status: CANCELLED | OUTPATIENT
Start: 2023-01-04 | End: 2023-01-04

## 2023-01-04 RX ORDER — SERTRALINE HYDROCHLORIDE 50 MG/1
50 TABLET, FILM COATED ORAL DAILY
Qty: 30 TABLET | Refills: 3 | Status: SHIPPED | OUTPATIENT
Start: 2023-01-04 | End: 2023-02-14

## 2023-01-04 RX ORDER — TRIAMCINOLONE ACETONIDE 40 MG/ML
40 INJECTION, SUSPENSION INTRA-ARTICULAR; INTRAMUSCULAR
Status: DISCONTINUED | OUTPATIENT
Start: 2023-01-03 | End: 2023-01-04 | Stop reason: HOSPADM

## 2023-01-04 RX ORDER — TRAZODONE HYDROCHLORIDE 50 MG/1
50 TABLET ORAL NIGHTLY
COMMUNITY
End: 2023-01-04 | Stop reason: SDUPTHER

## 2023-01-04 NOTE — PROCEDURES
Large Joint Aspiration/Injection: R subacromial bursa    Date/Time: 1/3/2023 3:30 PM  Performed by: Bunny Tavarez MD  Authorized by: Bunny Tavarez MD     Consent Done?:  Yes (Verbal)  Indications:  Pain  Site marked: the procedure site was marked    Timeout: prior to procedure the correct patient, procedure, and site was verified    Prep: patient was prepped and draped in usual sterile fashion    Local anesthetic:  Topical anesthetic and lidocaine 1% without epinephrine  Anesthetic total (ml):  4      Details:  Needle Size:  22 G  Ultrasonic Guidance for needle placement?: No    Approach:  Posterior  Location:  Shoulder  Site:  R subacromial bursa  Medications:  40 mg triamcinolone acetonide 40 mg/mL  Patient tolerance:  Patient tolerated the procedure well with no immediate complications

## 2023-01-04 NOTE — PROGRESS NOTES
[facility]  History & Physical    SUBJECTIVE:     Chief Complaint:   Chief Complaint   Patient presents with    Pre-op Exam     L knee replacement preop       History of Present Illness:  67 y.o. male who  has a past medical history of Asthma and Hypertension. Has not used asthma inhaler for years. Surghx: R knee arthroplasty 2019, presents to clinic today for establishing care and Pre-op eval for L knee arthroplasty w/Dr. Vigil.  Patient has no complaints other than L knee pain on exertion.  He denies fevers, chest pain, SOB, orthopnea, palpitations. No use of anti-coagulation.  He adds recently started following with Psychiatry for depression/anxiety, taking Zoloft. Taking trazodone for insomnia.  He is a former smoker, last alcoholic drink several months ago, denies other substances.    Allergies:  Review of patient's allergies indicates:  No Known Allergies    Home Medications:  Current Outpatient Medications on File Prior to Visit   Medication Sig    losartan (COZAAR) 50 MG tablet Take 50 mg by mouth.    [DISCONTINUED] sertraline (ZOLOFT) 50 MG tablet Take 50 mg by mouth once daily. Twice daily    [DISCONTINUED] traZODone (DESYREL) 50 MG tablet Take 50 mg by mouth every evening. 1.5 tabs    [DISCONTINUED] acetaminophen (TYLENOL) 325 MG tablet Take 1 tablet by mouth every 4 hours for 14 days    [DISCONTINUED] albuterol (VENTOLIN HFA) 90 mcg/actuation inhaler Inhale 2 puffs into the lungs every 6 (six) hours as needed for Wheezing. Rescue (Patient not taking: Reported on 1/4/2023)    [DISCONTINUED] albuterol (VENTOLIN HFA) 90 mcg/actuation inhaler Inhale 2 puffs into the lungs every 6 (six) hours as needed for Wheezing. Rescue    [DISCONTINUED] aspirin (ECOTRIN) 81 MG EC tablet Take 1 tablet (81 mg total) by mouth 2 (two) times daily.    [DISCONTINUED] azelastine (ASTELIN) 137 mcg (0.1 %) nasal spray 1 spray (137 mcg total) by Nasal route 2 (two) times daily.    [DISCONTINUED] azithromycin (ZITHROMAX  Z-CORNELIO) 250 MG tablet Take 2 po now  then 1 daily x 4 days (Patient taking differently: Take 2 po now  then 1 daily x 4 days)    [DISCONTINUED] cetirizine (ZYRTEC) 5 MG tablet Take 1 tablet (5 mg total) by mouth once daily.    [DISCONTINUED] chlorhexidine (PERIDEX) 0.12 % solution SMARTSIG:By Mouth    [DISCONTINUED] famotidine (PEPCID) 20 MG tablet Take 1 tablet (20 mg total) by mouth 2 (two) times daily.    [DISCONTINUED] guaiFENesin (MUCINEX) 600 mg 12 hr tablet Take 1 tablet (600 mg total) by mouth 2 (two) times daily.    [DISCONTINUED] hydrOXYzine HCl (ATARAX) 25 MG tablet Take 25 mg by mouth nightly as needed.    [DISCONTINUED] LORazepam (ATIVAN) 0.5 MG tablet Take 0.5 mg by mouth every 6 (six) hours as needed.    [DISCONTINUED] melatonin (MELATIN) Take 3 mg by mouth.    [DISCONTINUED] meloxicam (MOBIC) 15 MG tablet Take 15 mg by mouth once daily.    [DISCONTINUED] methocarbamoL (ROBAXIN) 500 MG Tab Take 500 mg by mouth 2 (two) times daily as needed.    [DISCONTINUED] sertraline (ZOLOFT) 50 MG tablet Take 50 mg by mouth.    [DISCONTINUED] sildenafil (VIAGRA) 100 MG tablet      Current Facility-Administered Medications on File Prior to Visit   Medication    lactated ringers infusion    [DISCONTINUED] lidocaine (PF) 10 mg/ml (1%) injection 10 mg    [DISCONTINUED] triamcinolone acetonide injection 40 mg       Past Medical History:   Diagnosis Date    Asthma     Hypertension      Past Surgical History:   Procedure Laterality Date    BACK SURGERY      ELBOW SURGERY Right     KNEE SURGERY      quad muscle tear repair     No family history on file.  Social History     Tobacco Use    Smoking status: Former     Types: Cigarettes    Smokeless tobacco: Never   Substance Use Topics    Alcohol use: Yes     Comment: socially    Drug use: Never        Review of Systems   Constitutional:  Negative for fever and weight loss.   Respiratory:  Negative for cough, shortness of breath and wheezing.    Cardiovascular:  Negative for  chest pain, palpitations and orthopnea.   Gastrointestinal:  Negative for abdominal pain and heartburn.   Genitourinary:  Negative for dysuria.   Musculoskeletal:  Negative for joint pain and myalgias.   Neurological:  Negative for dizziness and weakness.      OBJECTIVE:     Vital Signs:  Pulse: (!) 52 (01/04/23 1111)  BP: 123/69 (01/04/23 1111)    Physical Exam  Constitutional:       Appearance: Normal appearance. He is obese.   HENT:      Head: Normocephalic and atraumatic.   Cardiovascular:      Rate and Rhythm: Normal rate and regular rhythm.      Pulses: Normal pulses.      Heart sounds: Normal heart sounds.   Pulmonary:      Effort: Pulmonary effort is normal.      Breath sounds: Normal breath sounds.   Abdominal:      General: Abdomen is flat.      Palpations: Abdomen is soft.      Tenderness: There is no abdominal tenderness.   Musculoskeletal:         General: Normal range of motion.      Right lower leg: No edema.      Left lower leg: No edema.      Comments: L knee full ROM, no crepitation, non swollen, nontender to palp  R knee full ROM, central surgical scar   Neurological:      Mental Status: He is alert and oriented to person, place, and time.   Psychiatric:         Mood and Affect: Mood normal.         Behavior: Behavior normal.         Thought Content: Thought content normal.         Judgment: Judgment normal.     A/P:  Pre-op L Knee arthroplasty  DASI 58.2 for METS of 9.89 (only disability due to pain to affected L knee)  The patient is Low Risk for an Intermediate Risk surgery.     Depression  Follows with Psych, once per week  PLAN  -refill Zoloft, trazodone    DUE AT NEXT/FUTURE VISIT:  Results from L knee arthroplasty        No follow-ups on file.      Shravan Gloria  [facility], PGY-1  01/04/2023    This note was partially created using [employer] Voice Recognition software. Typographical and content errors may occur with this process. While efforts are made to detect and  correct such errors, in some cases errors will persist. For this reason, wording in this document should be considered in the proper context and not strictly verbatim     The following information is provided to all patients.  This information is to help you find resources for any of the problems found today that may be affecting your health:                Living healthy guide: [email or web address].[email or web address].[facility]       Understanding Diabetes: www.diabetes.org       Eating healthy: www.cdc.gov/healthyweight      CDC home safety checklist: www.cdc.gov/steadi/patient.html      Agency on Aging: www.goea.[place].[facility]       Alcoholics anonymous (AA): www.aa.org      Physical Activity: www.darnell.nih.gov/jm8mgjp       Tobacco use: www.quitwithusla.org

## 2023-01-04 NOTE — PRE-PROCEDURE INSTRUCTIONS
[place] [phone]    Allergies, medical, surgical, family and psychosocial histories reviewed with patient. Periop plan of care reviewed. Preop instructions given, including medications to take and to hold. Hibiclens soap and instructions on use given. Time allotted for questions to be addressed.  Patient verbalized understanding.

## 2023-01-04 NOTE — DISCHARGE INSTRUCTIONS
Your surgery is scheduled for 1/9/23.    Please report to [facility] on the 1st Floor at 0745 a.m.    THIS TIME IS SUBJECT TO CHANGE.  YOU WILL RECEIVE A PHONE CALL THE DAY BEFORE SURGERY BY 3:30 PM TO CONFIRM YOUR TIME OF ARRIVAL.  IF YOU HAVE NOT RECEIVED A PHONE CALL BY 3:30 PM THE DAY BEFORE YOUR SURGERY PLEASE CALL [phone]     INSTRUCTIONS IMPORTANT!!!  ¨ Do not eat or drink after 12 midnight-including water, candy, gum, & mints. OK to brush teeth.      ____  Proceed to [facility] on *** for additional testing.        ____  Do not wear makeup, including mascara.  ____  No powder, lotions or creams to surgical area.  ____  Please remove all jewelry, including piercings and leave at home.  ____  No money or valuables needed. Please leave at home.  ____  Please bring any documents given by your doctor.  ____  If going home the same day, arrange for a ride home. You will not be able to             drive if Anesthesia was used.  ____  Children under 18 years require a parent / guardian present the entire time             they are in surgery / recovery.  ____  Wear loose fitting clothing. Allow for dressings, bandages.  ____  Stop Aspirin, Ibuprofen, Motrin, Aleve, Goody's/BC powders, Excedrine and Naproxen (NSAIDS) at least 3-5 days before surgery, unless otherwise instructed by your doctor, or the nurse.   You MAY use Tylenol/acetaminophen until day of surgery.  ____  Wash the surgical area with Hibiclens the night before surgery, and again the             morning of surgery.  Be sure to rinse hibiclens off completely (if instructed by   nurse).  ____  If you take diabetic medication, do not take am of surgery unless instructed by Doctor.  ____  Call MD for temperature above 101 degrees or any other signs of infection such as Urinary (bladder) infection, Upper respiratory infection, skin boils, etc.  ____ Stop taking any Fish Oil supplement or any Vitamins that contain Vitamin E at  least 5 days prior to surgery.  ____ Do Not wear your contact lenses the day of your procedure.  You may wear your glasses.      ____Do not shave surgical site for 3 days prior to surgery.  ____ Practice Good hand washing before, during, and after procedure.      I have read or had read and explained to me, and understand the above information.  Additional comments or instructions:  For additional questions call [phone]      ANESTHESIA SIDE EFFECTS  -For the first 24 hours after surgery:  Do not drive, use heavy equipment, make important decisions, or drink alcohol  -It is normal to feel sleepy for several hours.  Rest until you are more awake.  -Have someone stay with you, if needed.  They can watch for problems and help keep you safe.  -Some possible post anesthesia side effects include: nausea and vomiting, sore throat and hoarseness, sleepiness, and dizziness.        Pre-Op Bathing Instructions    Before surgery, you can play an important role in your own health.    Because skin is not sterile, we need to be sure that your skin is as free of germs as possible. By following the instructions below, you can reduce the number of germs on your skin before surgery.    IMPORTANT: You will need to shower with a special soap called Hibiclens*, available at any pharmacy.  If you are allergic to Chlorhexidine (the antiseptic in Hibiclens), use an antibacterial soap such as Dial Soap for your preoperative shower.  You will shower with Hibiclens both the night before your surgery and the morning of your surgery.  Do not use Hibiclens on the head, face or genitals to avoid injury to those areas.    STEP #1: THE NIGHT BEFORE YOUR SURGERY     Do not shave the area of your body where your surgery will be performed.  Shower and wash your hair and body as usual with your normal soap and shampoo.  Rinse your hair and body thoroughly after you shower to remove all soap residue.  With your hand, apply one packet of Hibiclens soap to  the surgical site.   Wash the site gently for five (5) minutes. Do not scrub your skin too hard.   Do not wash with your regular soap after Hibiclens is used.  Rinse your body thoroughly.  Pat yourself dry with a clean, soft towel.  Do not use lotion, cream, or powder.  Wear clean clothes.    STEP #2: THE MORNING OF YOUR SURGERY     Repeat Step #1.    * Not to be used by people allergic to Chlorhexidine.

## 2023-01-04 NOTE — ANESTHESIA PREPROCEDURE EVALUATION
"                                                                                                             01/04/2023  Sy Aguiar is a 67 y.o., male scheduled for ARTHROPLASTY, KNEE, TOTAL monoblock (Left) 1/9/23.    Per family medicine Dr. Gloria, "The patient is Low Risk for an Intermediate Risk surgery."     Past Medical History:   Diagnosis Date    Asthma     Hypertension      Past Surgical History:   Procedure Laterality Date    BACK SURGERY      ELBOW SURGERY Right     KNEE SURGERY      quad muscle tear repair     Current Outpatient Medications   Medication Instructions    albuterol (VENTOLIN HFA) 90 mcg/actuation inhaler 2 puffs, Inhalation, Every 6 hours PRN, Rescue    losartan (COZAAR) 50 mg, Oral    methocarbamoL (ROBAXIN) 500 mg, Oral, 2 times daily PRN    sildenafil (VIAGRA) 100 MG tablet No dose, route, or frequency recorded.       Pre-op Assessment    I have reviewed the Patient Summary Reports.     I have reviewed the Nursing Notes.    I have reviewed the Medications.     Review of Systems  Anesthesia Hx:  No problems with previous Anesthesia   Denies Personal Hx of Anesthesia complications.   Social:  Former Smoker, No Alcohol Use    Cardiovascular:   Exercise tolerance: good Hypertension, well controlled  Denies Angina.    Pulmonary:   Asthma asymptomatic Denies Shortness of breath.  Denies Sleep Apnea.    Renal/:  Renal/ Normal     Hepatic/GI:  Hepatic/GI Normal  Denies GERD.    Musculoskeletal:   Arthritis     Neurological:  Neurology Normal Denies TIA.  Denies CVA. Denies Seizures.    Endocrine:  Endocrine Normal  Obesity / BMI > 30  Psych:   depression          Physical Exam  General: Well nourished and Cooperative    Airway:  Mallampati: II   Mouth Opening: Normal  TM Distance: Normal  Tongue: Normal  Neck ROM: Normal ROM    Dental:  Intact, Partial Dentures    Chest/Lungs:  Clear to auscultation, Normal Respiratory Rate    Heart:  Rate: Normal  Rhythm: " Regular Rhythm  Sounds: Normal      Lab Results   Component Value Date    WBC 6.60 11/17/2022    HGB 14.4 11/17/2022    HCT 42.1 11/17/2022     11/17/2022    CHOL 191 08/31/2022    TRIG 89 08/31/2022    HDL 88 (H) 08/31/2022    ALT 22 11/17/2022    AST 22 11/17/2022     11/17/2022    K 4.7 11/17/2022     11/17/2022    CREATININE 1.1 11/17/2022    BUN 23 11/17/2022    CO2 25 11/17/2022    INR 0.9 11/11/2019    HGBA1C 5.3 11/11/2019     Results for orders placed or performed in visit on 11/17/22   EKG 12-lead    Collection Time: 11/17/22 11:43 AM    Narrative    Test Reason : M25.562,G89.29,M17.12,M62.81,I10,    Vent. Rate : 054 BPM     Atrial Rate : 054 BPM     P-R Int : 192 ms          QRS Dur : 084 ms      QT Int : 412 ms       P-R-T Axes : 048 047 063 degrees     QTc Int : 390 ms    Sinus bradycardia  Normal ECG  When compared with ECG of 14-NOV-2019 13:02,  No significant change was found  Confirmed by IVANNA GRANADO MD ([id number]) on 11/18/2022 5:38:32 AM    Referred By: YONATHAN AL           Confirmed By:IVANNA GRANADO MD     CXR 11/17/22  Small lung volumes although to prior.  There is no new lung opacity.  No lung consolidation.  No large pleural effusion or pneumothorax.  Continued degenerative change of the thoracic spine.  Further evaluation as warranted clinically      Anesthesia Plan  Type of Anesthesia, risks & benefits discussed:    Anesthesia Type: Epidural, Spinal, CSE, Gen ETT  Intra-op Monitoring Plan: Standard ASA Monitors  Post Op Pain Control Plan: multimodal analgesia and peripheral nerve block  Induction:  IV  Informed Consent: Informed consent signed with the Patient and all parties understand the risks and agree with anesthesia plan.  All questions answered.   ASA Score: 2  Day of Surgery Review of History & Physical: H&P Update referred to the surgeon/provider.  Anesthesia Plan Notes: Anesthesia consent to be signed prior to surgery 1/9/23      Ready For Surgery From Anesthesia  Perspective.     .

## 2023-01-05 ENCOUNTER — PROCEDURE VISIT (OUTPATIENT)
Dept: ORTHOPEDICS | Facility: CLINIC | Age: 68
End: 2023-01-05
Payer: MEDICARE

## 2023-01-05 VITALS
DIASTOLIC BLOOD PRESSURE: 75 MMHG | HEART RATE: 61 BPM | HEIGHT: 73 IN | BODY MASS INDEX: 37.61 KG/M2 | SYSTOLIC BLOOD PRESSURE: 132 MMHG | WEIGHT: 283.75 LBS

## 2023-01-05 DIAGNOSIS — M17.12 PRIMARY OSTEOARTHRITIS OF LEFT KNEE: ICD-10-CM

## 2023-01-05 DIAGNOSIS — I10 HYPERTENSION, UNSPECIFIED TYPE: ICD-10-CM

## 2023-01-05 DIAGNOSIS — M25.562 CHRONIC PAIN OF LEFT KNEE: ICD-10-CM

## 2023-01-05 DIAGNOSIS — M62.81 QUADRICEPS WEAKNESS: ICD-10-CM

## 2023-01-05 DIAGNOSIS — G89.29 CHRONIC PAIN OF LEFT KNEE: ICD-10-CM

## 2023-01-05 PROCEDURE — 99499 NO LOS: ICD-10-PCS | Mod: S$GLB,,, | Performed by: ORTHOPAEDIC SURGERY

## 2023-01-05 PROCEDURE — 64640 INJECTION TREATMENT OF NERVE: CPT | Mod: LT,S$GLB,, | Performed by: ORTHOPAEDIC SURGERY

## 2023-01-05 PROCEDURE — 99499 UNLISTED E&M SERVICE: CPT | Mod: S$GLB,,, | Performed by: ORTHOPAEDIC SURGERY

## 2023-01-05 PROCEDURE — 64640 PR DESTRUCT BY NEURO AGENT; OTHER PERIPH NERVE: ICD-10-PCS | Mod: LT,S$GLB,, | Performed by: ORTHOPAEDIC SURGERY

## 2023-01-05 NOTE — PROCEDURES
Procedures  Procedure Note Iovera:    DATE OF PROCEDURE:  01/05/2023    PREOPERATIVE DIAGNOSIS: left knee pain.     POSTOPERATIVE DIAGNOSIS: left knee pain.     PROCEDURE: Iovera treatment of anterior femoral cutaneous nerve, Lateral femoral cut nerve, and both branches of infrapatellar saphenous nerve using at least 4 different punctures to treat all 4 nerves. (cpt 64640 x4)    ATTENDING SURGEON: Urbano Vigil M.D.   ASSISTANT: peng jj    COMPLICATIONS: None.     IMPLANTS:  None    ESTIMATED BLOOD LOSS:  < 5cc    SPECIMENS REMOVED:  None    ANESTHESIA: Local lidocaine    INDICATIONS FOR PROCEDURE: This is a 67 y.o. male with longstanding knee pain. They have failed non operative management including injections.I discussed a new treatment therapy called Iovera, which is cryotherapy, to provide symptomatic relief along the sensory distribution of the infrapatellar tendon branch of the saphenous nerve  and AFCN. The patient elected to move forward with this  We did discuss the fact that this is a fairly novel procedure and there is very limited scientific data around this.  However, it FDA approved.  The patient was given patient information and literature to review prior to the procedure as well.  Based on this, the patient agreed to move forward with doing the procedure.      PROCEDURE:    The patient was placed supine on the exam table and the proximal medial aspect of the left tibia and anterior aspect of distal femur was prepped with sterile Betadine and alcohol.  A line was drawn extending approximately 5 cm medial to inferior pole of the patella distally to a point approximately 5 cm medial to the tibial tubercle.  A second line was drawn in a medial to lateral direction the width of the patella approximately 7 cm proximal to the patella. We then infiltrated the skin with lidocaine along both lines using a 25g needle. We then introduced the Iovera device along these lines and this device penetrated the skin,  creating cryotherapy to both branches of the infrapatellar saphenous nerve, a third treatment to the anterior femoral cutaneous nerve, and fourth LFCN. 7 punctures of the skin were made to treat the 2 branches of the ISN and another 7 punctures were made to treat the AFCN and LFCN. There were a total of 4 nerves treated with iovera. The patient tolerated the procedure well with no problems.

## 2023-01-07 RX ORDER — CELECOXIB 100 MG/1
400 CAPSULE ORAL ONCE
Status: CANCELLED | OUTPATIENT
Start: 2023-01-07 | End: 2023-01-07

## 2023-01-07 RX ORDER — DICLOFENAC SODIUM 75 MG/1
75 TABLET, DELAYED RELEASE ORAL 2 TIMES DAILY
Qty: 84 TABLET | Refills: 0 | Status: CANCELLED | OUTPATIENT
Start: 2023-01-07 | End: 2023-02-18

## 2023-01-07 RX ORDER — CEPHALEXIN 500 MG/1
500 CAPSULE ORAL EVERY 6 HOURS
Qty: 4 CAPSULE | Refills: 0 | Status: CANCELLED | OUTPATIENT
Start: 2023-01-07 | End: 2023-01-08

## 2023-01-07 RX ORDER — DULOXETIN HYDROCHLORIDE 30 MG/1
60 CAPSULE, DELAYED RELEASE ORAL ONCE
Status: CANCELLED | OUTPATIENT
Start: 2023-01-07 | End: 2023-01-07

## 2023-01-07 RX ORDER — DEXAMETHASONE SODIUM PHOSPHATE 100 MG/10ML
10 INJECTION INTRAMUSCULAR; INTRAVENOUS ONCE
Status: CANCELLED | OUTPATIENT
Start: 2023-01-07 | End: 2023-01-07

## 2023-01-07 RX ORDER — ACETAMINOPHEN 325 MG/1
325 TABLET ORAL EVERY 4 HOURS
Qty: 84 TABLET | Refills: 0 | Status: CANCELLED | OUTPATIENT
Start: 2023-01-07 | End: 2023-01-21

## 2023-01-07 RX ORDER — ASPIRIN 81 MG/1
81 TABLET ORAL 2 TIMES DAILY
Qty: 84 TABLET | Refills: 0 | Status: CANCELLED | OUTPATIENT
Start: 2023-01-07 | End: 2023-02-18

## 2023-01-07 RX ORDER — TRANEXAMIC ACID 650 MG/1
1950 TABLET ORAL ONCE
Status: CANCELLED | OUTPATIENT
Start: 2023-01-07 | End: 2023-01-07

## 2023-01-07 RX ORDER — CEFAZOLIN SODIUM 2 G/50ML
2 SOLUTION INTRAVENOUS ONCE
Status: CANCELLED | OUTPATIENT
Start: 2023-01-07 | End: 2023-01-07

## 2023-01-07 RX ORDER — FAMOTIDINE 20 MG/1
20 TABLET, FILM COATED ORAL 2 TIMES DAILY PRN
Qty: 84 TABLET | Refills: 0 | Status: CANCELLED | OUTPATIENT
Start: 2023-01-07 | End: 2023-02-18

## 2023-01-07 RX ORDER — PREGABALIN 75 MG/1
150 CAPSULE ORAL ONCE
Status: CANCELLED | OUTPATIENT
Start: 2023-01-07 | End: 2023-01-07

## 2023-01-07 RX ORDER — TRANEXAMIC ACID 100 MG/ML
1000 INJECTION, SOLUTION INTRAVENOUS ONCE
Status: CANCELLED | OUTPATIENT
Start: 2023-01-07 | End: 2023-01-07

## 2023-01-07 RX ORDER — ACETAMINOPHEN 500 MG
1000 TABLET ORAL ONCE
Status: CANCELLED | OUTPATIENT
Start: 2023-01-07 | End: 2023-01-07

## 2023-01-09 ENCOUNTER — ANESTHESIA (OUTPATIENT)
Dept: SURGERY | Facility: HOSPITAL | Age: 68
End: 2023-01-09
Payer: MEDICARE

## 2023-01-09 ENCOUNTER — HOSPITAL ENCOUNTER (OUTPATIENT)
Facility: HOSPITAL | Age: 68
Discharge: HOME | End: 2023-01-09
Attending: ORTHOPAEDIC SURGERY | Admitting: ORTHOPAEDIC SURGERY
Payer: MEDICARE

## 2023-01-09 DIAGNOSIS — M17.12 PRIMARY OSTEOARTHRITIS OF LEFT KNEE: ICD-10-CM

## 2023-01-09 DIAGNOSIS — M17.12 ARTHRITIS OF LEFT KNEE: ICD-10-CM

## 2023-01-09 DIAGNOSIS — M17.11 ARTHRITIS OF RIGHT KNEE: Primary | ICD-10-CM

## 2023-01-09 LAB
APPEARANCE FLD: NORMAL
BODY FLD TYPE: NORMAL
COLOR FLD: NORMAL
LYMPHOCYTES NFR FLD MANUAL: 11 %
MONOS+MACROS NFR FLD MANUAL: 11 %
NEUTROPHILS NFR FLD MANUAL: 78 %
WBC # FLD: 114 /CU MM

## 2023-01-09 PROCEDURE — 76942 ECHO GUIDE FOR BIOPSY: CPT | Performed by: ANESTHESIOLOGY

## 2023-01-09 PROCEDURE — 63600175 PHARM REV CODE 636 W HCPCS: Performed by: NURSE ANESTHETIST, CERTIFIED REGISTERED

## 2023-01-09 PROCEDURE — 71000016 HC POSTOP RECOV ADDL HR: Performed by: ORTHOPAEDIC SURGERY

## 2023-01-09 PROCEDURE — 64450 NJX AA&/STRD OTHER PN/BRANCH: CPT | Performed by: ANESTHESIOLOGY

## 2023-01-09 PROCEDURE — 36000710: Performed by: ORTHOPAEDIC SURGERY

## 2023-01-09 PROCEDURE — 89051 BODY FLUID CELL COUNT: CPT | Performed by: ORTHOPAEDIC SURGERY

## 2023-01-09 PROCEDURE — 25000003 PHARM REV CODE 250: Performed by: ANESTHESIOLOGY

## 2023-01-09 PROCEDURE — 25000003 PHARM REV CODE 250: Performed by: NURSE ANESTHETIST, CERTIFIED REGISTERED

## 2023-01-09 PROCEDURE — 63600175 PHARM REV CODE 636 W HCPCS: Performed by: STUDENT IN AN ORGANIZED HEALTH CARE EDUCATION/TRAINING PROGRAM

## 2023-01-09 PROCEDURE — D9220A PRA ANESTHESIA: ICD-10-PCS | Mod: CRNA,,, | Performed by: NURSE ANESTHETIST, CERTIFIED REGISTERED

## 2023-01-09 PROCEDURE — 63600175 PHARM REV CODE 636 W HCPCS: Performed by: ANESTHESIOLOGY

## 2023-01-09 PROCEDURE — 36000711: Performed by: ORTHOPAEDIC SURGERY

## 2023-01-09 PROCEDURE — 20985 PR CPTR-ASST SURGICAL NAVIGATION IMAGE-LESS: ICD-10-PCS | Mod: LT,,, | Performed by: ORTHOPAEDIC SURGERY

## 2023-01-09 PROCEDURE — 37000008 HC ANESTHESIA 1ST 15 MINUTES: Performed by: ORTHOPAEDIC SURGERY

## 2023-01-09 PROCEDURE — 20985 CPTR-ASST DIR MS PX: CPT | Mod: LT,,, | Performed by: ORTHOPAEDIC SURGERY

## 2023-01-09 PROCEDURE — 25000003 PHARM REV CODE 250: Performed by: STUDENT IN AN ORGANIZED HEALTH CARE EDUCATION/TRAINING PROGRAM

## 2023-01-09 PROCEDURE — 97165 OT EVAL LOW COMPLEX 30 MIN: CPT

## 2023-01-09 PROCEDURE — 97535 SELF CARE MNGMENT TRAINING: CPT

## 2023-01-09 PROCEDURE — 37000009 HC ANESTHESIA EA ADD 15 MINS: Performed by: ORTHOPAEDIC SURGERY

## 2023-01-09 PROCEDURE — 27201423 OPTIME MED/SURG SUP & DEVICES STERILE SUPPLY: Performed by: ORTHOPAEDIC SURGERY

## 2023-01-09 PROCEDURE — 27447 PR TOTAL KNEE ARTHROPLASTY: ICD-10-PCS | Mod: LT,,, | Performed by: ORTHOPAEDIC SURGERY

## 2023-01-09 PROCEDURE — 25000003 PHARM REV CODE 250: Performed by: ORTHOPAEDIC SURGERY

## 2023-01-09 PROCEDURE — 97161 PT EVAL LOW COMPLEX 20 MIN: CPT

## 2023-01-09 PROCEDURE — D9220A PRA ANESTHESIA: ICD-10-PCS | Mod: ANES,,, | Performed by: ANESTHESIOLOGY

## 2023-01-09 PROCEDURE — D9220A PRA ANESTHESIA: Mod: CRNA,,, | Performed by: NURSE ANESTHETIST, CERTIFIED REGISTERED

## 2023-01-09 PROCEDURE — C1776 JOINT DEVICE (IMPLANTABLE): HCPCS | Performed by: ORTHOPAEDIC SURGERY

## 2023-01-09 PROCEDURE — D9220A PRA ANESTHESIA: Mod: ANES,,, | Performed by: ANESTHESIOLOGY

## 2023-01-09 PROCEDURE — 71000015 HC POSTOP RECOV 1ST HR: Performed by: ORTHOPAEDIC SURGERY

## 2023-01-09 PROCEDURE — 27447 TOTAL KNEE ARTHROPLASTY: CPT | Mod: LT,,, | Performed by: ORTHOPAEDIC SURGERY

## 2023-01-09 PROCEDURE — 71000033 HC RECOVERY, INTIAL HOUR: Performed by: ORTHOPAEDIC SURGERY

## 2023-01-09 RX ORDER — SODIUM CHLORIDE, SODIUM LACTATE, POTASSIUM CHLORIDE, CALCIUM CHLORIDE 600; 310; 30; 20 MG/100ML; MG/100ML; MG/100ML; MG/100ML
INJECTION, SOLUTION INTRAVENOUS CONTINUOUS
Status: DISCONTINUED | OUTPATIENT
Start: 2023-01-09 | End: 2023-01-09 | Stop reason: HOSPADM

## 2023-01-09 RX ORDER — BUPIVACAINE HYDROCHLORIDE 5 MG/ML
INJECTION, SOLUTION EPIDURAL; INTRACAUDAL
Status: DISCONTINUED | OUTPATIENT
Start: 2023-01-09 | End: 2023-01-09

## 2023-01-09 RX ORDER — TRANEXAMIC ACID 650 MG/1
1950 TABLET ORAL ONCE
Status: COMPLETED | OUTPATIENT
Start: 2023-01-09 | End: 2023-01-09

## 2023-01-09 RX ORDER — TRANEXAMIC ACID 100 MG/ML
INJECTION, SOLUTION INTRAVENOUS
Status: DISCONTINUED | OUTPATIENT
Start: 2023-01-09 | End: 2023-01-09 | Stop reason: HOSPADM

## 2023-01-09 RX ORDER — TRANEXAMIC ACID 100 MG/ML
1000 INJECTION, SOLUTION INTRAVENOUS ONCE
Status: COMPLETED | OUTPATIENT
Start: 2023-01-09 | End: 2023-01-09

## 2023-01-09 RX ORDER — TRANEXAMIC ACID 100 MG/ML
1000 INJECTION, SOLUTION INTRAVENOUS EVERY 6 HOURS
Status: DISCONTINUED | OUTPATIENT
Start: 2023-01-09 | End: 2023-01-09 | Stop reason: HOSPADM

## 2023-01-09 RX ORDER — OXYCODONE HYDROCHLORIDE 5 MG/1
5 TABLET ORAL
Status: DISCONTINUED | OUTPATIENT
Start: 2023-01-09 | End: 2023-01-09 | Stop reason: HOSPADM

## 2023-01-09 RX ORDER — FENTANYL CITRATE 50 UG/ML
25 INJECTION, SOLUTION INTRAMUSCULAR; INTRAVENOUS EVERY 5 MIN PRN
Status: DISCONTINUED | OUTPATIENT
Start: 2023-01-09 | End: 2023-01-09 | Stop reason: HOSPADM

## 2023-01-09 RX ORDER — SODIUM CHLORIDE 0.9 G/100ML
IRRIGANT IRRIGATION
Status: DISCONTINUED | OUTPATIENT
Start: 2023-01-09 | End: 2023-01-09 | Stop reason: HOSPADM

## 2023-01-09 RX ORDER — ACETAMINOPHEN 325 MG/1
650 TABLET ORAL EVERY 6 HOURS
Status: DISCONTINUED | OUTPATIENT
Start: 2023-01-09 | End: 2023-01-09 | Stop reason: HOSPADM

## 2023-01-09 RX ORDER — LIDOCAINE HYDROCHLORIDE 10 MG/ML
1 INJECTION, SOLUTION EPIDURAL; INFILTRATION; INTRACAUDAL; PERINEURAL ONCE
Status: DISCONTINUED | OUTPATIENT
Start: 2023-01-09 | End: 2023-01-09 | Stop reason: HOSPADM

## 2023-01-09 RX ORDER — FENTANYL CITRATE 50 UG/ML
INJECTION, SOLUTION INTRAMUSCULAR; INTRAVENOUS
Status: DISCONTINUED | OUTPATIENT
Start: 2023-01-09 | End: 2023-01-09

## 2023-01-09 RX ORDER — EPHEDRINE SULFATE 50 MG/ML
INJECTION, SOLUTION INTRAVENOUS
Status: DISCONTINUED | OUTPATIENT
Start: 2023-01-09 | End: 2023-01-09

## 2023-01-09 RX ORDER — ASPIRIN 81 MG/1
81 TABLET ORAL 2 TIMES DAILY
Qty: 84 TABLET | Refills: 0 | Status: SHIPPED | OUTPATIENT
Start: 2023-01-09 | End: 2023-02-20

## 2023-01-09 RX ORDER — CEFAZOLIN SODIUM 2 G/50ML
2 SOLUTION INTRAVENOUS EVERY 8 HOURS
Status: DISCONTINUED | OUTPATIENT
Start: 2023-01-09 | End: 2023-01-09 | Stop reason: HOSPADM

## 2023-01-09 RX ORDER — BISACODYL 10 MG
10 SUPPOSITORY, RECTAL RECTAL 2 TIMES DAILY PRN
Status: DISCONTINUED | OUTPATIENT
Start: 2023-01-09 | End: 2023-01-09 | Stop reason: HOSPADM

## 2023-01-09 RX ORDER — DEXAMETHASONE SODIUM PHOSPHATE 4 MG/ML
INJECTION, SOLUTION INTRA-ARTICULAR; INTRALESIONAL; INTRAMUSCULAR; INTRAVENOUS; SOFT TISSUE
Status: DISCONTINUED | OUTPATIENT
Start: 2023-01-09 | End: 2023-01-09

## 2023-01-09 RX ORDER — FAMOTIDINE 20 MG/1
20 TABLET, FILM COATED ORAL 2 TIMES DAILY PRN
Qty: 84 TABLET | Refills: 0 | Status: SHIPPED | OUTPATIENT
Start: 2023-01-09 | End: 2023-02-20

## 2023-01-09 RX ORDER — ACETAMINOPHEN 500 MG
1000 TABLET ORAL ONCE
Status: COMPLETED | OUTPATIENT
Start: 2023-01-09 | End: 2023-01-09

## 2023-01-09 RX ORDER — CEFAZOLIN SODIUM 2 G/50ML
2 SOLUTION INTRAVENOUS ONCE
Status: COMPLETED | OUTPATIENT
Start: 2023-01-09 | End: 2023-01-09

## 2023-01-09 RX ORDER — ACETAMINOPHEN 325 MG/1
325 TABLET ORAL EVERY 4 HOURS
Qty: 84 TABLET | Refills: 0 | Status: SHIPPED | OUTPATIENT
Start: 2023-01-09 | End: 2023-01-23

## 2023-01-09 RX ORDER — PREGABALIN 75 MG/1
150 CAPSULE ORAL ONCE
Status: COMPLETED | OUTPATIENT
Start: 2023-01-09 | End: 2023-01-09

## 2023-01-09 RX ORDER — ONDANSETRON 2 MG/ML
4 INJECTION INTRAMUSCULAR; INTRAVENOUS DAILY PRN
Status: DISCONTINUED | OUTPATIENT
Start: 2023-01-09 | End: 2023-01-09 | Stop reason: HOSPADM

## 2023-01-09 RX ORDER — CELECOXIB 100 MG/1
200 CAPSULE ORAL 2 TIMES DAILY
Status: DISCONTINUED | OUTPATIENT
Start: 2023-01-09 | End: 2023-01-09 | Stop reason: HOSPADM

## 2023-01-09 RX ORDER — PREGABALIN 75 MG/1
75 CAPSULE ORAL 2 TIMES DAILY
Status: DISCONTINUED | OUTPATIENT
Start: 2023-01-09 | End: 2023-01-09 | Stop reason: HOSPADM

## 2023-01-09 RX ORDER — CEPHALEXIN 500 MG/1
500 CAPSULE ORAL EVERY 6 HOURS
Qty: 4 CAPSULE | Refills: 0 | Status: SHIPPED | OUTPATIENT
Start: 2023-01-09 | End: 2023-01-10

## 2023-01-09 RX ORDER — DULOXETIN HYDROCHLORIDE 30 MG/1
60 CAPSULE, DELAYED RELEASE ORAL ONCE
Status: COMPLETED | OUTPATIENT
Start: 2023-01-09 | End: 2023-01-09

## 2023-01-09 RX ORDER — CELECOXIB 100 MG/1
400 CAPSULE ORAL ONCE
Status: COMPLETED | OUTPATIENT
Start: 2023-01-09 | End: 2023-01-09

## 2023-01-09 RX ORDER — BUPIVACAINE HYDROCHLORIDE 2.5 MG/ML
INJECTION, SOLUTION EPIDURAL; INFILTRATION; INTRACAUDAL
Status: DISCONTINUED | OUTPATIENT
Start: 2023-01-09 | End: 2023-01-09

## 2023-01-09 RX ORDER — ONDANSETRON 2 MG/ML
4 INJECTION INTRAMUSCULAR; INTRAVENOUS EVERY 12 HOURS PRN
Status: DISCONTINUED | OUTPATIENT
Start: 2023-01-09 | End: 2023-01-09 | Stop reason: HOSPADM

## 2023-01-09 RX ORDER — FAMOTIDINE 20 MG/1
20 TABLET, FILM COATED ORAL 2 TIMES DAILY
Status: DISCONTINUED | OUTPATIENT
Start: 2023-01-09 | End: 2023-01-09 | Stop reason: HOSPADM

## 2023-01-09 RX ORDER — DEXAMETHASONE SODIUM PHOSPHATE 4 MG/ML
10 INJECTION, SOLUTION INTRA-ARTICULAR; INTRALESIONAL; INTRAMUSCULAR; INTRAVENOUS; SOFT TISSUE ONCE
Status: DISCONTINUED | OUTPATIENT
Start: 2023-01-09 | End: 2023-01-09 | Stop reason: HOSPADM

## 2023-01-09 RX ORDER — PHENYLEPHRINE HYDROCHLORIDE 10 MG/ML
INJECTION INTRAVENOUS
Status: DISCONTINUED | OUTPATIENT
Start: 2023-01-09 | End: 2023-01-09

## 2023-01-09 RX ORDER — POVIDONE-IODINE 10 %
SOLUTION, NON-ORAL TOPICAL
Status: DISCONTINUED | OUTPATIENT
Start: 2023-01-09 | End: 2023-01-09 | Stop reason: HOSPADM

## 2023-01-09 RX ORDER — BUPIVACAINE HYDROCHLORIDE AND EPINEPHRINE 2.5; 5 MG/ML; UG/ML
INJECTION, SOLUTION EPIDURAL; INFILTRATION; INTRACAUDAL; PERINEURAL
Status: DISCONTINUED | OUTPATIENT
Start: 2023-01-09 | End: 2023-01-09 | Stop reason: HOSPADM

## 2023-01-09 RX ORDER — AMOXICILLIN 250 MG
1 CAPSULE ORAL 2 TIMES DAILY
Status: DISCONTINUED | OUTPATIENT
Start: 2023-01-09 | End: 2023-01-09 | Stop reason: HOSPADM

## 2023-01-09 RX ORDER — MIDAZOLAM HYDROCHLORIDE 1 MG/ML
INJECTION, SOLUTION INTRAMUSCULAR; INTRAVENOUS
Status: DISCONTINUED | OUTPATIENT
Start: 2023-01-09 | End: 2023-01-09

## 2023-01-09 RX ORDER — PROMETHAZINE HYDROCHLORIDE 25 MG/ML
6.25 INJECTION, SOLUTION INTRAMUSCULAR; INTRAVENOUS EVERY 6 HOURS PRN
Status: DISCONTINUED | OUTPATIENT
Start: 2023-01-09 | End: 2023-01-09 | Stop reason: HOSPADM

## 2023-01-09 RX ORDER — PROPOFOL 10 MG/ML
VIAL (ML) INTRAVENOUS CONTINUOUS PRN
Status: DISCONTINUED | OUTPATIENT
Start: 2023-01-09 | End: 2023-01-09

## 2023-01-09 RX ORDER — DICLOFENAC SODIUM 75 MG/1
75 TABLET, DELAYED RELEASE ORAL 2 TIMES DAILY
Qty: 84 TABLET | Refills: 0 | Status: SHIPPED | OUTPATIENT
Start: 2023-01-09 | End: 2023-02-20

## 2023-01-09 RX ADMIN — SODIUM CHLORIDE, SODIUM LACTATE, POTASSIUM CHLORIDE, AND CALCIUM CHLORIDE: .6; .31; .03; .02 INJECTION, SOLUTION INTRAVENOUS at 07:01

## 2023-01-09 RX ADMIN — EPHEDRINE SULFATE 10 MG: 50 INJECTION, SOLUTION INTRAMUSCULAR; INTRAVENOUS; SUBCUTANEOUS at 07:01

## 2023-01-09 RX ADMIN — PHENYLEPHRINE HYDROCHLORIDE 100 MCG: 10 INJECTION INTRAVENOUS at 08:01

## 2023-01-09 RX ADMIN — FENTANYL CITRATE 50 MCG: 50 INJECTION, SOLUTION INTRAMUSCULAR; INTRAVENOUS at 07:01

## 2023-01-09 RX ADMIN — PHENYLEPHRINE HYDROCHLORIDE 100 MCG: 10 INJECTION INTRAVENOUS at 09:01

## 2023-01-09 RX ADMIN — CEFAZOLIN SODIUM 2 G: 2 SOLUTION INTRAVENOUS at 07:01

## 2023-01-09 RX ADMIN — PREGABALIN 150 MG: 75 CAPSULE ORAL at 06:01

## 2023-01-09 RX ADMIN — TRANEXAMIC ACID 1000 MG: 100 INJECTION, SOLUTION INTRAVENOUS at 10:01

## 2023-01-09 RX ADMIN — MEPIVACAINE HYDROCHLORIDE 4 ML: 15 INJECTION, SOLUTION EPIDURAL; INFILTRATION at 07:01

## 2023-01-09 RX ADMIN — TRANEXAMIC ACID 1000 MG: 100 INJECTION, SOLUTION INTRAVENOUS at 08:01

## 2023-01-09 RX ADMIN — PHENYLEPHRINE HYDROCHLORIDE 100 MCG: 10 INJECTION INTRAVENOUS at 07:01

## 2023-01-09 RX ADMIN — CELECOXIB 400 MG: 100 CAPSULE ORAL at 06:01

## 2023-01-09 RX ADMIN — PROPOFOL 100 MCG/KG/MIN: 10 INJECTION, EMULSION INTRAVENOUS at 07:01

## 2023-01-09 RX ADMIN — BUPIVACAINE HYDROCHLORIDE 10 ML: 5 INJECTION, SOLUTION EPIDURAL; INTRACAUDAL; PERINEURAL at 10:01

## 2023-01-09 RX ADMIN — BUPIVACAINE HYDROCHLORIDE 10 ML: 2.5 INJECTION, SOLUTION EPIDURAL; INFILTRATION; INTRACAUDAL; PERINEURAL at 10:01

## 2023-01-09 RX ADMIN — GLYCOPYRROLATE 0.2 MG: 0.2 INJECTION, SOLUTION INTRAMUSCULAR; INTRAVITREAL at 07:01

## 2023-01-09 RX ADMIN — MIDAZOLAM 4 MG: 1 INJECTION INTRAMUSCULAR; INTRAVENOUS at 07:01

## 2023-01-09 RX ADMIN — TRANEXAMIC ACID 1950 MG: 650 TABLET ORAL at 06:01

## 2023-01-09 RX ADMIN — DEXAMETHASONE SODIUM PHOSPHATE 10 MG: 4 INJECTION, SOLUTION INTRA-ARTICULAR; INTRALESIONAL; INTRAMUSCULAR; INTRAVENOUS; SOFT TISSUE at 07:01

## 2023-01-09 RX ADMIN — ACETAMINOPHEN 1000 MG: 500 TABLET ORAL at 06:01

## 2023-01-09 RX ADMIN — DULOXETINE 60 MG: 30 CAPSULE, DELAYED RELEASE ORAL at 06:01

## 2023-01-09 NOTE — TRANSFER OF CARE
Anesthesia Transfer of Care Note    Patient: Sy Aguiar    Procedure(s) Performed: Procedure(s) (LRB):  ARTHROPLASTY, KNEE, TOTAL monoblock (Left)    Patient location: PACU    Anesthesia Type: spinal    Transport from OR: Transported from OR on room air with adequate spontaneous ventilation    Post pain: adequate analgesia    Post assessment: no apparent anesthetic complications and tolerated procedure well    Post vital signs: stable    Level of consciousness: awake    Nausea/Vomiting: no nausea/vomiting    Complications: none    Transfer of care protocol was followed      Last vitals:   Visit Vitals  /77 (BP Location: Right arm, Patient Position: Lying)   Pulse (!) 55   Temp 37.4 °C (99.3 °F) (Temporal)   Resp 17   Ht 6' (1.829 m)   Wt 124.7 kg (275 lb)   SpO2 97%   BMI 37.30 kg/m²

## 2023-01-09 NOTE — PLAN OF CARE
Problem: Physical Therapy  Goal: Physical Therapy Goal  Outcome: Met     Educated pt and pt's niece on knee positioning with elevation to promote knee extension, use of ice pack for pain management, gait training with RW, car transfers, step negotiation, and LE exercises. Pt ambulated 45 ft with RW and CGA/SBA. Plan is for pt to d/c home today with OP PT to begin tomorrow's date.

## 2023-01-09 NOTE — H&P
Orthopedic Surgery H&P Update    I have reviewed the patient's history and exam and there are no interval changes. Patient will be admitted to same day/short stay for total knee arthroplasty.      The patient has failed non operative management, has painful crepitus, and has swelling. The natural history of degenerative arthritis was discussed at length and nonoperative and operative treatment was reviewed. Due to the pain and associated disability, I recommend left total knee replacement. The risks, benefits, convalescence, and alternatives were reviewed. Numerous questions were asked and answered. Models were used as an education tool. Surgery will be scheduled at a convenient time. The discussion with the patient included a description of a total knee replacement. A total knee replacement (TKR) means a resurfacing of all three surfaces-the patella, femur, and tibia, with metal and plastic parts. The prosthetic parts are usually cemented into position and will allow a patient a full range of motion from. The postoperative motion, however, is determined by multiple factors, the most important of which is preoperative motion. In general, the better the motion preoperatively, the better the motion postoperatively. In younger patients I will generally use an uncemented tibial component and leave the patella unresurfaced, in an effort to preserve bone stock for any future revision surgeries. In those patients we discuss the risk of anterior knee pain in a small subset of patients (5-10%) with an unresurfaced patella. The operation, pending medical clearance, generally requires a hospitalization of 0-3 days for one knee (three-four days for a bilateral replacement). In general, we prefer to perform the procedure under epidural/spinal anesthesia. Patient blood donation will be based on the individual patient's preoperative hemoglobin/hematocrit levels. The operation will be done preferably in a minimally invasive fashion  which will facilitate recovery. While performing the procedure in a minimally invasive manner is our preference, some patients are not candidates. In that situation, a non-minimally invasive technique will be performed. This will not adversely affect the long term success of the TKR. Postoperatively, the patient is walking the day of surgery and may be discahrged the day of surgery if stable with a walker or cane. The first couple of days are very painful and the pain medication will alleviate but not eliminate the pain. Thus, the patient must really push hard to get the range of motion. The cane is to be dispensed with once the patient is secure enough. In general, there is no cast or brace required with a routine knee replacement. In the long term, we do not encourage our patients to run for the sake of running; although, pending their preoperative status, we often allow patients to play doubles tennis or comparable activities. We also allow them to do gentle intermediate downhill skiing if they are truly an expert skier. Biking is encouraged as well as swimming. The follow-up periods are usually at three weeks, 3 months, six months, and yearly intervals. Potential complications with total knee replacements include infection (less than 2%), which is much higher in patients with obesity, diabetes, or other conditions which adversely affect the immune system. (Patients with a previous history of osteomyelitis can have infection rates as high as 15%). By nerve damage we mean a peroneal nerve palsy with a foot drop (a flapping foot with ambulation). This is particularly more apt to occur with a bad valgus (knock knee) deformity. The incidence can be quite high in this particular patient population. There are always areas of skin numbness, but this is not an untoward effect, nor do we consider it a complication. Other potential complications include dislocation of the patellar component (less than 2%). The loosening of  either the tibial or femoral component is much more infrequent. It usually occurs with infection or long-term use in a patient population that is at extreme risk (e.g., markedly overweight and not conscientious about their exercises). Major blood vessel damage is also extremely rare. Theoretically, because of the anatomic proximity of the popliteal artery, it could be lacerated with subsequent repairs required. Albeit unlikely, a disruption of the popliteal artery could theoretically result in an amputation. Similarly, infection could theoretically result in an amputation if one were to grow out an organism that cannot be controlled with antibiotics. General medical complications include phlebitis for which we prophylactically anticoagulate, but it could still occur and fatal pulmonary embolus has been reported. Cardiovascular problems, such as a heart attack or ischemia, are always a concern with such hemodynamic changes in the blood vascular system. Other general complications are very rare but in medicine anything could theoretically happen. We also discussed performing a pre-operative peripheral sensory block of the bracnhes of the AFCN and ISN of the same knee using iovera to help with pain control post surgery. This is a relatively new technology with early data demonstrating significant pain improvement and functional recovery. However the science defining all the associated risks is still developing. I think the patient understands the risk benefit ratio of total knee replacement and the iovera treatment and would like to pursue the total knee replacement and iovera treatment. we will begin the pre-operative process. Additionally, this case will undergo peer review for appropriateness of care during our departmental weekly indications conference prior to surgery.     There are no changes to the H&P documented below  Danie Breaux  \[facility]\"" Orthopaedic Surgery    \[facility]\"" Family Medicine  History & Physical      SUBJECTIVE:      Chief Complaint:        Chief Complaint   Patient presents with    Pre-op Exam       L knee replacement preop         History of Present Illness:  67 y.o. male who  has a past medical history of Asthma and Hypertension. Has not used asthma inhaler for years. Surghx: R knee arthroplasty 2019, presents to clinic today for establishing care and Pre-op eval for L knee arthroplasty w/Dr. Vigil.  Patient has no complaints other than L knee pain on exertion.  He denies fevers, chest pain, SOB, orthopnea, palpitations. No use of anti-coagulation.  He adds recently started following with Psychiatry for depression/anxiety, taking Zoloft. Taking trazodone for insomnia.  He is a former smoker, last alcoholic drink several months ago, denies other substances.     Allergies:  Review of patient's allergies indicates:  No Known Allergies     Home Medications:       Current Outpatient Medications on File Prior to Visit   Medication Sig    losartan (COZAAR) 50 MG tablet Take 50 mg by mouth.    [DISCONTINUED] sertraline (ZOLOFT) 50 MG tablet Take 50 mg by mouth once daily. Twice daily    [DISCONTINUED] traZODone (DESYREL) 50 MG tablet Take 50 mg by mouth every evening. 1.5 tabs    [DISCONTINUED] acetaminophen (TYLENOL) 325 MG tablet Take 1 tablet by mouth every 4 hours for 14 days    [DISCONTINUED] albuterol (VENTOLIN HFA) 90 mcg/actuation inhaler Inhale 2 puffs into the lungs every 6 (six) hours as needed for Wheezing. Rescue (Patient not taking: Reported on 1/4/2023)    [DISCONTINUED] albuterol (VENTOLIN HFA) 90 mcg/actuation inhaler Inhale 2 puffs into the lungs every 6 (six) hours as needed for Wheezing. Rescue    [DISCONTINUED] aspirin (ECOTRIN) 81 MG EC tablet Take 1 tablet (81 mg total) by mouth 2 (two) times daily.    [DISCONTINUED] azelastine (ASTELIN) 137 mcg (0.1 %) nasal spray 1 spray (137 mcg total) by Nasal route 2 (two) times daily.    [DISCONTINUED] azithromycin (ZITHROMAX Z-CORNELIO) 250 MG tablet Take 2  po now  then 1 daily x 4 days (Patient taking differently: Take 2 po now  then 1 daily x 4 days)    [DISCONTINUED] cetirizine (ZYRTEC) 5 MG tablet Take 1 tablet (5 mg total) by mouth once daily.    [DISCONTINUED] chlorhexidine (PERIDEX) 0.12 % solution SMARTSIG:By Mouth    [DISCONTINUED] famotidine (PEPCID) 20 MG tablet Take 1 tablet (20 mg total) by mouth 2 (two) times daily.    [DISCONTINUED] guaiFENesin (MUCINEX) 600 mg 12 hr tablet Take 1 tablet (600 mg total) by mouth 2 (two) times daily.    [DISCONTINUED] hydrOXYzine HCl (ATARAX) 25 MG tablet Take 25 mg by mouth nightly as needed.    [DISCONTINUED] LORazepam (ATIVAN) 0.5 MG tablet Take 0.5 mg by mouth every 6 (six) hours as needed.    [DISCONTINUED] melatonin (MELATIN) Take 3 mg by mouth.    [DISCONTINUED] meloxicam (MOBIC) 15 MG tablet Take 15 mg by mouth once daily.    [DISCONTINUED] methocarbamoL (ROBAXIN) 500 MG Tab Take 500 mg by mouth 2 (two) times daily as needed.    [DISCONTINUED] sertraline (ZOLOFT) 50 MG tablet Take 50 mg by mouth.    [DISCONTINUED] sildenafil (VIAGRA) 100 MG tablet            Current Facility-Administered Medications on File Prior to Visit   Medication    lactated ringers infusion    [DISCONTINUED] lidocaine (PF) 10 mg/ml (1%) injection 10 mg    [DISCONTINUED] triamcinolone acetonide injection 40 mg              Past Medical History:   Diagnosis Date    Asthma      Hypertension              Past Surgical History:   Procedure Laterality Date    BACK SURGERY        ELBOW SURGERY Right      KNEE SURGERY         quad muscle tear repair      No family history on file.  Social History            Tobacco Use    Smoking status: Former       Types: Cigarettes    Smokeless tobacco: Never   Substance Use Topics    Alcohol use: Yes       Comment: socially    Drug use: Never         Review of Systems   Constitutional:  Negative for fever and weight loss.   Respiratory:  Negative for cough, shortness of breath and wheezing.    Cardiovascular:   Negative for chest pain, palpitations and orthopnea.   Gastrointestinal:  Negative for abdominal pain and heartburn.   Genitourinary:  Negative for dysuria.   Musculoskeletal:  Negative for joint pain and myalgias.   Neurological:  Negative for dizziness and weakness.       OBJECTIVE:      Vital Signs:  Pulse: (!) 52 (01/04/23 1111)  BP: 123/69 (01/04/23 1111)     Physical Exam  Constitutional:       Appearance: Normal appearance. He is obese.   HENT:      Head: Normocephalic and atraumatic.   Cardiovascular:      Rate and Rhythm: Normal rate and regular rhythm.      Pulses: Normal pulses.      Heart sounds: Normal heart sounds.   Pulmonary:      Effort: Pulmonary effort is normal.      Breath sounds: Normal breath sounds.   Abdominal:      General: Abdomen is flat.      Palpations: Abdomen is soft.      Tenderness: There is no abdominal tenderness.   Musculoskeletal:         General: Normal range of motion.      Right lower leg: No edema.      Left lower leg: No edema.      Comments: L knee full ROM, no crepitation, non swollen, nontender to palp  R knee full ROM, central surgical scar   Neurological:      Mental Status: He is alert and oriented to person, place, and time.   Psychiatric:         Mood and Affect: Mood normal.         Behavior: Behavior normal.         Thought Content: Thought content normal.         Judgment: Judgment normal.      A/P:  Pre-op L Knee arthroplasty  DASI 58.2 for METS of 9.89 (only disability due to pain to affected L knee)  The patient is Low Risk for an Intermediate Risk surgery.      Depression  Follows with Psych, once per week  PLAN  -refill Zoloft, trazodone     DUE AT NEXT/FUTURE VISIT:  Results from L knee arthroplasty           No follow-ups on file.        Shravan Gloria  [facility], PGY-1  01/04/2023     This note was partially created using [employer] Voice Recognition software. Typographical and content errors may occur with this process. While efforts are  made to detect and correct such errors, in some cases errors will persist. For this reason, wording in this document should be considered in the proper context and not strictly verbatim      The following information is provided to all patients.  This information is to help you find resources for any of the problems found today that may be affecting your health:                Living healthy guide: www.[facility].[email or web address].[email or web address]       Understanding Diabetes: www.diabetes.org       Eating healthy: www.cdc.gov/healthyweight      CDC home safety checklist: www.cdc.gov/steadi/patient.html      Agency on Aging: www.goea.[place].[facility]       Alcoholics anonymous (AA): www.aa.org      Physical Activity: www.[place]arnell.nih.gov/yy0cuzo       Tobacco use: www.quitwithusla.org

## 2023-01-09 NOTE — PATIENT INSTRUCTIONS
Post Op Total Knee Arthroplasty Instructions     1. Enteric coated aspirin 81 mg by mouth twice a day for 6 weeks to prevent blood clots,  unless otherwise indicated.  2. Please take a stomach reflux medication such as pepcid, prevacid, nexium (H-2 blocker or PPI) while on aspirin to prevent stomach ulcers. You will be given a prescription for pepcid.  3. Edmund stockings should be worn as much as possible for 6 weeks to prevent blood clots.  4. Do not start antibiotics for any suspected infections related to the surgery until evaluated by dr armando staff. 4 doses of Keflex will be prescribed after surgery.   5. No driving for approximately 2-4 weeks  6. You can shower once the incision is completely dry, otherwise place a new dry dressing twice a day if there is drainage. Please call the office if the drainage increases after discharge.  7. You may resume all pre-surgery medications unless otherwise instructed  8. All patients should be seen in Dr Armando office approx 2 weeks after surgery  9. Dr Vigil prefers outpatient physical therapy upon discharge home. If home PT is needed, please contact Dr Vigil for approval  10. Patients should see their primary care doctor after discharge home  11. If you are sent to a rehab/nursing facility please notify Dr armando office once discharged.  12. Pain control upon discharge: You will be given Diclofenac which you should take twice daily for 6 weeks. Tylenol prescription will also be given which should be taken every 4 hours for 2 weeks.

## 2023-01-09 NOTE — ANESTHESIA PROCEDURE NOTES
L Ipack ss    Patient location during procedure: post-op   Block not for primary anesthetic.  Reason for block: at surgeon's request and post-op pain management   Post-op Pain Location: L knee pain   Start time: 1/9/2023 10:06 AM  Timeout: 1/9/2023 10:00 AM   End time: 1/9/2023 10:09 AM    Staffing  Authorizing Provider: Liza Rogers MD  Performing Provider: Liza Rogers MD    Preanesthetic Checklist  Completed: patient identified, IV checked, site marked, risks and benefits discussed, surgical consent, monitors and equipment checked, pre-op evaluation and timeout performed  Peripheral Block  Patient position: supine  Prep: ChloraPrep  Patient monitoring: heart rate, cardiac monitor, continuous pulse ox, continuous capnometry and frequent blood pressure checks  Block type: I PACK  Laterality: left  Injection technique: single shot  Needle  Needle type: Echogenic   Needle gauge: 21 G  Needle length: 4 in  Needle localization: ultrasound guidance  Needle insertion depth: 7 cm   -ultrasound image captured on disc.  Assessment  Injection assessment: negative aspiration, negative parasthesia and local visualized surrounding nerve  Paresthesia pain: none  Heart rate change: no  Slow fractionated injection: yes  Pain Tolerance: no complaints  Medications:    Medications: bupivacaine (pf) (MARCAINE) injection 0.25% - Perineural   10 mL - 1/9/2023 10:08:00 AM    Additional Notes  Bupiv with 1: 400 k EPI;  PT being monitored continuously in PACU; VSS throughout; no s/sx IV injection

## 2023-01-09 NOTE — ANESTHESIA PROCEDURE NOTES
Spinal    Diagnosis: L knee pain  Patient location during procedure: OR  Start time: 1/9/2023 7:03 AM  Timeout: 1/9/2023 7:01 AM  End time: 1/9/2023 7:07 AM    Staffing  Authorizing Provider: Liza Rogers MD  Performing Provider: Liza Rogers MD    Preanesthetic Checklist  Completed: patient identified, IV checked, site marked, risks and benefits discussed, surgical consent, monitors and equipment checked, pre-op evaluation and timeout performed  Spinal Block  Patient position: sitting  Prep: ChloraPrep  Patient monitoring: heart rate, cardiac monitor and continuous pulse ox  Approach: midline  Injection technique: single shot  CSF Fluid: clear free-flowing CSF  Needle  Needle type: pencil-tip   Needle gauge: 25 G  Needle length: 3.5 in  Additional Documentation: no paresthesia on injection  Needle localization: anatomical landmarks  Assessment  Sensory level: T9   Dermatomal levels determined by pinch or prick  Ease of block: easy  Patient's tolerance of the procedure: comfortable throughout block and no complaints  Medications:    Medications: mepivacaine (CARBOCAINE) injection 15 mg/mL (1.5%) - Other   4 mL - 1/9/2023 7:05:00 AM

## 2023-01-09 NOTE — PLAN OF CARE
OT evaluation completed, co-evaluation with PT. Patient with functional capacity to safely d/c to home on this date post op day of surgery left TKA with pre-scheduled OP PT services tomorrow. Patient demonstrates ability to complete functional mobility with rolling walker and LD ADLs with CGA to SBA with min verbal cues. Pain in left knee at incision is minimal and controlled. Patient has needed DME. Note to follow.    Problem: Occupational Therapy  Goal: Occupational Therapy Goal  Description: Goals to be met by: 1/9/2023     Patient will increase functional independence with ADLs by performing:    Verbalized understanding/ verbalized teachback for recommendations for ADL/mobility task modifications post op joint replacement to support safe d/c to home; goal met    Outcome: Met

## 2023-01-09 NOTE — ANESTHESIA POSTPROCEDURE EVALUATION
Anesthesia Post Evaluation    Patient: Sy Aguiar    Procedure(s) Performed: Procedure(s) (LRB):  ARTHROPLASTY, KNEE, TOTAL monoblock (Left)    Final Anesthesia Type: spinal      Patient location during evaluation: PACU  Patient participation: Yes- Able to Participate  Level of consciousness: awake and alert  Post-procedure vital signs: reviewed and stable  Pain management: adequate  Airway patency: patent    PONV status at discharge: No PONV  Anesthetic complications: no      Cardiovascular status: blood pressure returned to baseline  Respiratory status: unassisted  Hydration status: euvolemic  Follow-up not needed.          Vitals Value Taken Time   /76 01/09/23 1114   Temp 36.7 °C (98.1 °F) 01/09/23 0950   Pulse 92 01/09/23 1117   Resp 13 01/09/23 1117   SpO2 100 % 01/09/23 1117   Vitals shown include unvalidated device data.      Event Time   Out of Recovery 10:43:00         Pain/Melia Score: Pain Rating Prior to Med Admin: 6 (1/9/2023  6:34 AM)  Melia Score: 10 (1/9/2023  9:50 AM)

## 2023-01-09 NOTE — OP NOTE
Procedure Note Shahnaz Monoblock TKA:      DATE OF PROCEDURE:  1/9/2023    PREOPERATIVE DIAGNOSIS: left knee bone-on-bone post traumatic osteoarthritis, previous quad tendon rupture.     POSTOPERATIVE DIAGNOSIS: left knee bone-on-bone post traumatic osteoarthritis, previous quad tendon rupture.     PROCEDURE: Computer-assisted left total knee arthroplasty with unresurfaced patella.     ATTENDING SURGEON: Urbano Vigil M.D.   ASSISTANT:     Risk Adjustment: Please see media tab for any additional diagnoses faxed from my office related to theTKA.    COMPLICATIONS: None.     IMPLANTS:   1. Shahnaz NexGen trabecular metal monoblock tibial tray size 7 and 10 mm height .   2. Shahnaz Persona femoral component  Size 11  left.     INDICATIONS FOR PROCEDURE: This is a 67 y.o. male with longstanding knee pain.  He has a remote history of previous quad tendon rupture.  They have failed nonoperative management including injections. Risks and benefits of total knee arthroplasty were explained to the patient. The patient agreed to move forward with total knee arthroplasty. We also discussed the fact that no resurfacing the patella could lead to anterior knee pain requiring additional surgery in the future.     FINDINGS: The patient had a complete articular cartilage loss down to subchondral bone throughout the knee.     PROCEDURE IN DETAIL: The patient was then brought to the Operating Room and spinal anesthesia started without any difficulties. The patient was placed supine on the operative table.  Knee was then prepped and draped in sterile fashion. Prior to incision, proper site and procedure as well as antibiotic administration was verified. AFCN and ISN nerves were then injected with marcaine. Anterior midline incision was created utilizing the previous quad tendon rupture incision overlying center patella proximally to the medial border of the tibial tubercle distally. Skin, scar, subcutaneous fat and deep  fascial layer were sharply incised until we came to extensor mechanism retinaculum. Flap was then elevated medially to VMO and laterally to lateral border of patella. Knee was then aspirated and synovial fluid sent for cell count. Medial parapatellar arthrotomy was injected with Marcaine and a medial parapatellar arthrotomy was then created. Synovium overlying the anterolateral distal femur was then excised as well as the infrapatellar tendon fat pad. Sleeve of soft tissue was elevated off the proximal medial tibia. Periphery of the patella was denvervated using bovie cautery, Hohmann retractors then placed medially and laterally along the distal femur to protect the collateral ligaments. ACL and anterior horn of lateral meniscus were then transected. We then pinned our navigation tracker on to distal femur and then performed our anatomy survey for the femur. We placed distal femoral cutting guide such that we were perpendicular to mechanical axis, aiming for about 1 degree of flexion and about 9 mm of bony resection. Distal femur was then resected. Next, we placed AP sizing guide on distal femur and measured for a size 11 femoral component. We then drilled 2 pin holes in 3 degrees of external rotation relative to posterior condylar axis. Anteroposterior condylar bone was then resected. Resected posterior femoral bone measured approximately 3 mm in difference with the lateral piece thinner than the medial piece. Next, we subluxed the tibia forward and resected medial and lateral menisci. We then pinned our navigation tracker on to the proximal tibia and then performed our anatomy survey for tibia. We placed our proximal tibial cutting guide such that we were perpendicular to the mechanical axis, aiming for about 1 to 2 mm of bony resection medially  and about  6 degrees posterior slope. Proximal tibial bone was then resected and detached from posterior soft tissues using Bovie cautery. Next, with knee at 90 degrees,  we placed a laminar  in lateral compartment and resected the ACL as well as small osteophytes posteriorly along the femur. We then injected the posterior capsule with marcaine. We then assessed our gaps using a 10 mm spacer block. With the 10 mm spacer block, the knee came out to full extension with nice stability with varus and valgus stress, and nice symmetry between flexion and extension. We assessed our tibial cut and our alignment burt fell within the center of the ankle. Once we were happy with soft tissue sleeves and bony cuts, we then placed tibial protector on the proximal tibia and we then created our chamfer cuts. We then placed our femoral trial. This was lateralized as much as possible and anchored using the lug hole screws.  Next, we subluxed the tibia forward, and sized our proximal tibia for a size 7 baseplate, the center of which was rotated such that it was in line with medial third tibial tubercle. This was then pinned in place. We then trialed using a 10 trial polyethylene. With 10 mm polyethylene, the knee came out to full extension. We had nice stability with varus and valgus stress and nice symmetry between flexion and extension. Patella tracked centrally within trochlear groove. Once we were happy with all our trial components, we then removed all our trial components, except the tibial baseplate. We then drilled our trabecular metal peg holes. We then drilled the sclerotic bone along the tibia using a long drill bit as well. We then placed the knee in extension and examined the posterior aspect of knee for bleeding. We then prepared our bony surfaces for implantation using pulse lavage antibiotic saline. Femoral component was then impacted into palce. We then protected the femoral component with a lap pad and subluxed the tibia forward. we then subluxed the tibia forward and impacted the monoblock tibial component into place. We then reexamined our gaps, stability and tracking; all of  which remained unchanged. We then copiously irrigated the knee with pulse lavage betadine saline. We then closed our medial parapatellar arthrotomy with a running #2 barbed suture,  subcutaneous deep fascial layer was closed with simple interrupted #1 vicryl suture, subcutaneous skin with 2-0 Vicryl and incision with silver water proof dressing. The patient was then transferred to Recovery Room bed in stable condition.

## 2023-01-09 NOTE — PT/OT/SLP EVAL
Occupational Therapy   Evaluation, Treatment, and Discharge Summary    Name: Sy Aguiar  MRN: [id number]  Admitting Diagnosis: <principal problem not specified>  Recent Surgery: Procedure(s) (LRB):  ARTHROPLASTY, KNEE, TOTAL monoblock (Left) Day of Surgery    Recommendations:     Discharge Recommendations: outpatient PT  Discharge Equipment Recommendations:  none  Barriers to discharge:  Other (Comment) (no therapy barriers)    Assessment:     Sy Aguiar is a 67 y.o. male with a medical diagnosis of <principal problem not specified>.  He presents with ..The primary encounter diagnosis was Arthritis of right knee. Diagnoses of Primary osteoarthritis of left knee and Arthritis of left knee were also pertinent to this visit.  . Performance deficits affecting function: weakness, impaired endurance, impaired self care skills, impaired functional mobility, gait instability, impaired balance, decreased lower extremity function.      OT evaluation completed, co-evaluation with PT. Patient with functional capacity to safely d/c to home on this date post op day of surgery left TKA with pre-scheduled OP PT services tomorrow. Patient demonstrates ability to complete functional mobility with rolling walker and LD ADLs with CGA to SBA with min verbal cues. Pain in left knee at incision is minimal and controlled. Patient has needed DME.      Plan:     Patient to be seen  (d/c OT; patient is d/c to home on this date) to address the above listed problems via self-care/home management, therapeutic activities, therapeutic exercises  Plan of Care Expires: 01/09/23 (Patient is scheduled to d/c to home on this date)  Plan of Care Reviewed with: patient, other (see comments) (niece)    Subjective     Chief Complaint: no complaints  Patient/Family Comments/goals: Patient eager for participation and states he has prepared his home ahead of time for home discharge.    Occupational Profile:  Living Environment: Patient lives  alone in a 2nd floor apartment. Elevator access. Tub shower combo with tub transfer bench.  Previous level of function: Prior to admission, patient was independent in all areas of occupation without assistive device. Does report that his left knee pain limited some activities.  Roles and Routines: ADL, IADL  including meal prep / home management; community mobility / driving; working as a musician  Equipment Used at Home: other (see comments) (rolling walker, tub transfer bench)  Assistance upon Discharge: Patient will have assistance as needed from his niece; niece present during evaluation/treatment and confirmed she is able to be of assistance in any capacity    Pain/Comfort:  Pain Rating 1: 1/10 (L knee)  Pain Addressed 1: Pre-medicate for activity, Reposition, Nurse notified  Pain Rating Post-Intervention 1: 3/10 (L knee)      Objective:     Communicated with: nursing prior to session.  Patient found HOB elevated with peripheral IV upon OT entry to room.    General Precautions: Standard, fall  Orthopedic Precautions: LLE weight bearing as tolerated  Braces: N/A  Respiratory Status: Room air    Occupational Performance:    Bed Mobility:    Patient completed Supine to Sit with stand by assistance  Patient completed Sit to Supine with stand by assistance    Functional Mobility/Transfers:  Patient completed Sit <> Stand Transfer with CGA progressing to SBA  with  rolling walker   Functional Mobility: Ambulated in room to hallway with light CGA with rolling walker with PT. OT on standby due to first mobility attempt day of surgery. OT instructed on visual gaze ahead. OT provided min follow up joint protective postioning cues of UE s on walker. Patient with declination for toileting/transfer to commode but participatory in verbalized teachback back for proper mechanics / how to.    Activities of Daily Living:  Feeding:  independence    Upper Body Dressing: set up; encouraged to modify routine by instead performing in  seated vs standing  Lower Body Dressing: minimum assistance ; instructed for donning LLE surgical extremity first; receptive for performance with increased time. Educated on safety precautions for maintaining one UE on walker and alternating once in standing to manage pants over hips  Toileting: not assessed; no urge to void (RN reports patient voided earlier); upon interview patient reports typically urinating in seated. Educated with verbalized teachback method for task modifications s/p joint replacement. Also provided with additional urinal to take home to use during first nighttime at home.    Cognitive/Visual Perceptual:  Cognitive/Psychosocial Skills:     -       Oriented to: Person, Place, Time, and Situation   -       Follows Commands/attention:Follows multistep  commands  -       Memory: No Deficits noted  -       Safety awareness/insight to disability: intact   -       Mood/Affect/Coping skills/emotional control: Cooperative  Visual/Perceptual:      -Intact      Physical Exam:  Skin integrity: Aquacel dressing over L knee, intact  Sensation:    -       Intact; reports slightly decreased in LLE  Upper Extremity Range of Motion:     -       Right Upper Extremity: WFL  -       Left Upper Extremity: WFL  Upper Extremity Strength:    -       Right Upper Extremity: WFL  -       Left Upper Extremity: WFL    AMPAC 6 Click ADL:  AMPAC Total Score: 21    Treatment & Education:  Patient and his niece educated on role of OT, participatory in POC development, and educated on with reciprocation for proper positioning techniques, use of elevation and icing, and consideration for performing ankle pumps throughout day for blood flow in combination for use of technique of slow transitions to reduce potential dizziness. Patient transitioned eob for assessment as above. Performed extended mobility trial with PT with OT on standby due to first mobility effort post op day of surgery followed by guided in toileting/ dressing  training. Provided education on technique for car transfers as well as reciprocal instructions for shower safety. Returned to HOB elevated. Opportunity provided for further questions but none noted.    Patient left HOB elevated with all lines intact, call button in reach, nursing notified, and niece present    GOALS:   Multidisciplinary Problems       Occupational Therapy Goals       Not on file              Multidisciplinary Problems (Resolved)          Problem: Occupational Therapy    Goal Priority Disciplines Outcome Interventions   Occupational Therapy Goal   (Resolved)     OT, PT/OT Met    Description: Goals to be met by: 1/9/2023     Patient will increase functional independence with ADLs by performing:    Verbalized understanding/ verbalized teachback for recommendations for ADL/mobility task modifications post op joint replacement to support safe d/c to home; goal met                         History:     Past Medical History:   Diagnosis Date    Asthma     Hypertension          Past Surgical History:   Procedure Laterality Date    BACK SURGERY      ELBOW SURGERY Right     KNEE SURGERY      quad muscle tear repair       Time Tracking:     OT Date of Treatment: 01/09/23  OT Start Time: 1228  OT Stop Time: 1254  OT Total Time (min): 26 min total time ; co eval with PT 2/2 first attempt for mobility day of surgery    Billable Minutes:Evaluation 8 min  Self Care/Home Management 8 min    1/9/2023

## 2023-01-09 NOTE — ANESTHESIA PROCEDURE NOTES
L Adductor SS    Patient location during procedure: post-op   Block not for primary anesthetic.  Reason for block: at surgeon's request and post-op pain management   Post-op Pain Location: L knee pain   Start time: 1/9/2023 10:02 AM  Timeout: 1/9/2023 10:00 AM   End time: 1/9/2023 10:04 AM    Staffing  Authorizing Provider: Liza Rogers MD  Performing Provider: Liza Rogers MD    Preanesthetic Checklist  Completed: patient identified, IV checked, site marked, risks and benefits discussed, surgical consent, monitors and equipment checked, pre-op evaluation and timeout performed  Peripheral Block  Patient position: supine  Prep: ChloraPrep  Patient monitoring: heart rate, cardiac monitor, continuous pulse ox, continuous capnometry and frequent blood pressure checks  Block type: adductor canal  Laterality: left  Injection technique: single shot  Needle  Needle type: Stimuplex   Needle gauge: 21 G  Needle length: 4 in  Needle localization: anatomical landmarks and ultrasound guidance  Needle insertion depth: 4 cm   -ultrasound image captured on disc.  Assessment  Injection assessment: negative aspiration, negative parasthesia and local visualized surrounding nerve  Paresthesia pain: none  Heart rate change: no  Slow fractionated injection: yes  Pain Tolerance: comfortable throughout block and no complaints  Medications:    Medications: bupivacaine (pf) (MARCAINE) injection 0.5% - Perineural   10 mL - 1/9/2023 10:05:00 AM    Additional Notes  VSS.  [facility] RN monitoring vitals throughout procedure.  Patient tolerated procedure well.   Adductor done with mixture 10 cc exparel and 10 cc 0.5 bupiv with 1: 400 k epi

## 2023-01-09 NOTE — PT/OT/SLP EVAL
Physical Therapy Evaluation and Discharge Note    Patient Name:  Sy Aguiar   MRN:  [id number]    Recommendations:     Discharge Recommendations: outpatient PT  Discharge Equipment Recommendations: none   Barriers to discharge: None    Assessment:     Sy Aguiar is a 67 y.o. male admitted with a medical diagnosis of L TKA. Educated pt and pt's niece on knee positioning with elevation to promote knee extension, use of ice pack for pain management, gait training with RW, car transfers, step negotiation, and LE exercises. Pt ambulated 45 ft with RW and CGA/SBA. Plan is for pt to d/c home today with OP PT to begin tomorrow's date.    Recent Surgery: Procedure(s) (LRB):  ARTHROPLASTY, KNEE, TOTAL monoblock (Left) Day of Surgery    Plan:     During this hospitalization, patient does not require further acute PT services.  Please re-consult if situation changes.      Subjective     Chief Complaint: none  Patient/Family Comments/goals: pt is very pleasant and agreeable to participate in therapy session  Pain/Comfort:  Pain Rating 1: 1/10  Location - Side 1: Left  Location 1: knee  Pain Rating Post-Intervention 1: 2/10    Patients cultural, spiritual, Episcopal conflicts given the current situation: no    Living Environment:  Pt lives alone in a 2nd floor apt with elevator access with tub/shower combo with TTB.  Prior to admission, patients level of function was independent without AD for mobility and ADLs, drives, and works as a musician.  Equipment used at home: bath bench, walker, rolling.  DME owned (not currently used): rolling walker.  Upon discharge, patient will have assistance from his niece to assist as needed upon d/c.    Objective:     Communicated with nurse prior to session.  Patient found HOB elevated with peripheral IV upon PT entry to room.    General Precautions: Standard, fall    Orthopedic Precautions:LLE weight bearing as tolerated   Braces: N/A  Respiratory Status: Room  air    Exams:  Postural Exam:  Patient presented with the following abnormalities:    -       Rounded shoulders  Sensation:    -       Impaired  slightly decreased to L lower leg  Skin Integrity/Edema:      -       Skin integrity: L knee surgical incision with dressing in place  RLE ROM: WFL  RLE Strength: WFL  LLE ROM: WFL except knee AROM ~10-70 deg  LLE Strength: ankle DF WFLs - resistance not applied to knee/hip    Functional Mobility:  Bed Mobility:     Scooting: supervision  Supine to Sit: supervision  Sit to Supine: supervision  Transfers:     Sit to Stand:  stand by assistance and contact guard assistance with rolling walker  Gait: 45 ft with RW and CGA progressing to SBA - educated on 3-point gait pattern with increased UE WB to off-weight LLE as needed.     AM-PAC 6 CLICK MOBILITY  Total Score:18       Treatment and Education:  Educated pt on role of PT, pt is very motivated, pleasant, and agreeable to participate in therapy session.  Pt transitioned to sit EOB, completed LE assessment.  Provided demonstration for hand placement prior to standing then pt stood and completed lateral weight shifting and stepping in place. Cues for increased UE WB through RW as needed to off-weight LLE during stance phase.  Ambulated in aguiar then sat EOB. OT educated pt on LBD.  Educated on step negotiation and car transfers.    AM-PAC 6 CLICK MOBILITY  Total Score:18     Patient left HOB elevated with all lines intact, call button in reach, and nurse notified.    GOALS:   Multidisciplinary Problems       Physical Therapy Goals       Not on file              Multidisciplinary Problems (Resolved)          Problem: Physical Therapy    Goal Priority Disciplines Outcome Goal Variances Interventions   Physical Therapy Goal   (Resolved)     PT, PT/OT Met                         History:     Past Medical History:   Diagnosis Date    Asthma     Hypertension        Past Surgical History:   Procedure Laterality Date    BACK SURGERY       ELBOW SURGERY Right     KNEE SURGERY      quad muscle tear repair       Time Tracking:     PT Received On: 01/09/23  PT Start Time: 1227     PT Stop Time: 1254  PT Total Time (min): 27 min with OT    Billable Minutes: Evaluation 10      01/09/2023

## 2023-01-09 NOTE — BRIEF OP NOTE
Physician Discharge Summary     Patient Id:  Sy Aguiar  1955  [id number]    Admit date: 1/9/2023     Discharge date: same    Admitting Physician: Urbano Vigil MD    Discharge Physician: Urbano Vigil MD    Admission Diagnoses: Chronic pain of left knee [M25.562, G89.29]  Primary osteoarthritis of left knee [M17.12]  Quadriceps weakness [M62.81]  Hypertension, unspecified type [I10]     Discharge Diagnoses: L TKA    Admission Condition: good    Discharged Condition: good    Indication for Admission: L TKA    Hospital Course: Patient presented to [facility] on day of scheduled surgery and underwent L total knee arthroplasty, please see operative report for further detail. Patient did well postoperatively and was found to be fit for discharge on POD# 0.  The patient mobilized with physical therapy.  They were taught how to use DME appropriately.  Their pain was controlled.  The patient met all discharge criteria.  The patient was discharged home in stable condition. Patient was given paper prescriptions.  They were given a follow-up appointment in 2 weeks in clinic for a wound check and range of motion check.  All questions and concerns of the patient were answered to their satisfaction.     Consults: None    Significant Diagnostic Studies: None applicable      Treatments: surgery: L TKA     Disposition: stable     Patient Active Problem List    Diagnosis Date Noted    Quadriceps weakness 10/11/2022    Depression 10/11/2022    Hypertension     Primary osteoarthritis of left knee 06/14/2022    COVID 04/18/2022    Chronic pain of left knee 12/10/2019    Arthritis of right knee 12/09/2019    Olecranon bursitis of right elbow 04/16/2018        Patient Instructions:      Medication List        START taking these medications      acetaminophen 325 MG tablet  Commonly known as: TYLENOL  Take 1 tablet (325 mg total) by mouth every 4 (four) hours. for 14 days     aspirin 81 MG EC tablet  Commonly known as: ECOTRIN  Take  1 tablet (81 mg total) by mouth 2 (two) times a day.     cephALEXin 500 MG capsule  Commonly known as: KEFLEX  Take 1 capsule (500 mg total) by mouth every 6 (six) hours. for 4 doses     diclofenac 75 MG EC tablet  Commonly known as: VOLTAREN  Take 1 tablet (75 mg total) by mouth 2 (two) times daily.     docusate sodium 50 MG capsule  Commonly known as: COLACE  Take 1 capsule (50 mg total) by mouth every 6 (six) hours.     famotidine 20 MG tablet  Commonly known as: PEPCID  Take 1 tablet (20 mg total) by mouth 2 (two) times daily as needed for Heartburn.            CONTINUE taking these medications      losartan 50 MG tablet  Commonly known as: COZAAR     sertraline 50 MG tablet  Commonly known as: ZOLOFT  Take 1 tablet (50 mg total) by mouth once daily. Twice daily     traZODone 50 MG tablet  Commonly known as: DESYREL  Take 1 tablet (50 mg total) by mouth every evening. 1.5 tabs               Where to Get Your Medications        You can get these medications from any pharmacy    Bring a paper prescription for each of these medications  acetaminophen 325 MG tablet  aspirin 81 MG EC tablet  cephALEXin 500 MG capsule  diclofenac 75 MG EC tablet  docusate sodium 50 MG capsule  famotidine 20 MG tablet          Post Op Total Knee Arthroplasty Instructions     1. Enteric coated aspirin 81 mg by mouth twice a day for 6 weeks to prevent blood clots,  unless otherwise indicated.  2. Please take a stomach reflux medication such as pepcid, prevacid, nexium (H-2 blocker or PPI) while on aspirin to prevent stomach ulcers. You will be given a prescription for pepcid.  3. Edmund stockings should be worn as much as possible for 6 weeks to prevent blood clots.  4. Do not start antibiotics for any suspected infections related to the surgery until evaluated by dr armando staff  5. No driving for approximately 2-4 weeks  6. You can shower once the incision is completely dry, otherwise place a new dry dressing twice a day if there is  drainage. Please call the office if the drainage increases after discharge.  7. You may resume all pre-surgery medications unless otherwise indicated  8. All patients should be seen in Dr Lira office approx 2 weeks after surgery  9. Dr Vigil prefers outpatient physical therapy upon discharge home. If home PT is needed, please contact Dr Vigil for approval  10. Patients should see their primary care doctor after discharge home      Discussed plan with patient and answered questions: Yes       Danie Breaux  [employer] [facility], PGY-3

## 2023-01-10 ENCOUNTER — CLINICAL SUPPORT (OUTPATIENT)
Dept: REHABILITATION | Facility: HOSPITAL | Age: 68
End: 2023-01-10
Payer: MEDICARE

## 2023-01-10 VITALS
RESPIRATION RATE: 17 BRPM | SYSTOLIC BLOOD PRESSURE: 147 MMHG | TEMPERATURE: 98 F | HEIGHT: 72 IN | DIASTOLIC BLOOD PRESSURE: 73 MMHG | HEART RATE: 55 BPM | OXYGEN SATURATION: 97 % | BODY MASS INDEX: 37.25 KG/M2 | WEIGHT: 275 LBS

## 2023-01-10 DIAGNOSIS — M25.562 CHRONIC PAIN OF LEFT KNEE: ICD-10-CM

## 2023-01-10 DIAGNOSIS — M62.81 QUADRICEPS WEAKNESS: ICD-10-CM

## 2023-01-10 DIAGNOSIS — G89.29 CHRONIC PAIN OF LEFT KNEE: ICD-10-CM

## 2023-01-10 DIAGNOSIS — M17.12 PRIMARY OSTEOARTHRITIS OF LEFT KNEE: ICD-10-CM

## 2023-01-10 PROCEDURE — 97161 PT EVAL LOW COMPLEX 20 MIN: CPT | Mod: PO

## 2023-01-10 NOTE — Clinical Note
Dr. Vigil, I am [place] primary PT at [facility]. His range of motion and quadriceps are on target of 0-90 degrees with good contraction. I did not that He is noted to have multiple oval-shaped wounds on the medial aspect of the knee, that the patient reports is from part of the operation. I just wanted to confirm this with you and see if there was anything you would like me to do to cover it?  Becky Ayon PT, DPT Board Certified in Orthopedic Physical Therapy

## 2023-01-10 NOTE — PLAN OF CARE
[facility]   Physical Therapy Initial Evaluation     Date: 1/10/2023   Name: Sy Aguiar  Clinic Number: [id number]    Therapy Diagnosis:   Encounter Diagnoses   Name Primary?    Chronic pain of left knee     Primary osteoarthritis of left knee     Quadriceps weakness      Physician: Urbano Vigil MD    Physician Orders: PT Eval and Treat   Medical Diagnosis from Referral:   M25.562,G89.29 (ICD-10-CM) - Chronic pain of left knee   M17.12 (ICD-10-CM) - Primary osteoarthritis of left knee   M62.81 (ICD-10-CM) - Quadriceps weakness   I10 (ICD-10-CM) - Hypertension, unspecified type     Evaluation Date: 1/10/2023  Authorization Period Expiration: None  Plan of Care Expiration: 3/1/2023  Progress Note Due: 2/9/2022  Visit # / Visits authorized: 1/ 1   FOTO: 1/3    Precautions: Standard, HTN **History of Left Quad Tendon Repair**    Time In: 2:03 pm  Time Out: 3:00 pm  Total Appointment Time (timed & untimed codes): 57 minutes  SUBJECTIVE   Date of onset: 1/9/23  History of current condition - Sy reports: undergoing a total knee arthoplasty with Dr. Vigil yesterday. He was discharged from the hospital on 1/9/23, same day. He has had one bowel movement since the surgery and is not having any headaches, calf pain, or difficulty walking. He is not having any numbness and tingling.  Falls: None  Imaging, Radiographs: Interval left knee arthroplasty.  Hardware projects in appropriate alignment.  Scattered subcutaneous and intra-articular gas, likely postprocedural.     Prior Therapy: None  Social History: Lives alone (niece lives in [facility] and a friend across the street). Elevator in his apartment to the second floor, and apartment is one level without stairs. He has a shower chair with grab bars in the shower, which is a tub-shower combination.   Occupation: Professional Musician (Bass Player)- Sitting  Prior Level of Function: Independent  Current Level of Function: Impairments  "consistent with post-operative status.    Pain:  Current 5/10 (stiffness), worst 5/10, best 0/10   Location: left knee   Description: Aching and Dull  Aggravating Factors: Bending, Walking, and Lifting  Easing Factors: pain medication, ice, and rest    Patients goals: "I wanna get well so that I can get on my flight."     Medical History:   Past Medical History:   Diagnosis Date    Asthma     Hypertension      Surgical History:   Sy Aguiar  has a past surgical history that includes Back surgery; Elbow surgery (Right); and Knee surgery.    Medications:   Sy has a current medication list which includes the following prescription(s): acetaminophen, aspirin, cephalexin, diclofenac, docusate sodium, famotidine, losartan, sertraline, and trazodone, and the following Facility-Administered Medications: lactated ringers.    Allergies:   Review of patient's allergies indicates:  No Known Allergies   OBJECTIVE   Vitals  Heart Rate: 66 bpm  Blood Pressure: 117/76 mmHg  Ambulation: Sy ambulates with a walker and with good gait stride and full terminal knee extension with a step-through gait pattern, with slight forward flexed posture.    Sitting to Standing: Sy attempts to maintain left knee slightly extended and off-weighted during sitting to standing transfer with bilateral upper extremity assistance used in a safe manner on stable surface.    Integumentary: Sy demonstrates a clean and intact bandage without any evidence of bleeding. He is noted to have multiple oval-shaped wounds on the medial aspect of the knee, that the patient reports is from part of the operation.    Circumference:   Right Left   Knee Joint Line - 54.7 cm   10 cm below 39.2 cm 46.3 cm (at edge of bandage)     Knee Active Range of Motion   Right Left   Flexion - 90 degrees   Extension - 0 degrees     Knee Passive Range of Motion   Right  Left End-Feel   Flexion  90 degrees Not pressed   Extension  0 degrees Swollen     **Quad Set: " "Poor; Utilizes co-contraction of gluteal muscles without any trace of quadriceps contraction. Tactile stimulation as well as contraction of opposite quadriceps with repetition allowed improved recruitment of the left quadriceps and this achieved status of fair to good by the end of session.  Joint Mobility  Tibiofemoral A/P [place] -   Tibiofemoral P/A [place] -   Patellofemoral Joint Mobility Within normal limits       Limitation/Restriction for FOTO Knee Survey    Therapist reviewed FOTO scores for Sy Aguiar on 1/10/2023.   FOTO documents entered into [employer] - see Media section.    Limitation Score: 48%         TREATMENT     Total Treatment time (time-based codes) separate from Evaluation: 30 minutes    Sy received the treatments listed below:    therapeutic exercises to develop strength, endurance, ROM, flexibility, posture, and core stabilization for 15 minutes including:    Knee Extension with Prop, 3 minutes  Glute Sets, 10x5" hold  Heel Slides with Dorsiflexion (X5), 10x  Ankle Pumps, 30x  Short-Arc Quad, 2x10, 3" hold    manual therapy techniques: Joint mobilizations were applied for 5 minutes, including:  Patellar Mobilizations, all planes    neuromuscular re-education activities to improve: Kinesthetic and Sense for 10 minutes. The following activities were included:  Quad Sets, bilateral for neuromuscular recruitment, 20x3" hold    cold pack for 10 minutes to right knee.    PATIENT EDUCATION AND HOME EXERCISES     Education provided:   - -Findings of evaluation and examination, and affect of these on plan for treatment  -Prognosis and expectations  -Role of PT and team-centered care for patient  -Home exercise program and expectations of therapy  -Teams System of [facility] and PT/PTA treatment  - Importance of compliance with exercises at home to facilitate a desired outcome of surgery  -Signs/Symptoms of DVT    Written Home Exercises Provided: yes. Exercises were reviewed and Sy was able " to demonstrate them prior to the end of the session.  Sy demonstrated good  understanding of the education provided. See EMR under Patient Instructions for exercises provided during therapy sessions.    ASSESSMENT     Sy is a 67 y.o. male referred to outpatient Physical Therapy with a medical diagnosis of   M25.562,G89.29 (ICD-10-CM) - Chronic pain of left knee   M17.12 (ICD-10-CM) - Primary osteoarthritis of left knee   M62.81 (ICD-10-CM) - Quadriceps weakness   I10 (ICD-10-CM) - Hypertension, unspecified type   . Patient presents with limitations consistent with post-operative limitations in ambulation, sitting to standing transfer, and step-navigation due to post-operative edema, decreased active and passive range of motion, albeit the patient demonstrates range of motion that is on target with goals of full, terminal extension and 90 degrees of flexion the day after surgery. He had some difficulty in achieving quadriceps set without co-contraction of glute, however in cristiana opposite knee, this aided the patient in being able to isolate quadriceps contraction with fair to good quality.    Patient prognosis is Good.   Patient will benefit from skilled outpatient Physical Therapy to address the deficits stated above and in the chart below, provide patient /family education, and to maximize patientt's level of independence.     Plan of care discussed with patient: Yes  Patient's spiritual, cultural and educational needs considered and patient is agreeable to the plan of care and goals as stated below:     Anticipated Barriers for therapy: None    Medical Necessity is demonstrated by the following  History  Co-morbidities and personal factors that may impact the plan of care Co-morbidities:   HTN    Personal Factors:   no deficits     low   Examination  Body Structures and Functions, activity limitations and participation restrictions that may impact the plan of care Body Regions:   lower  extremities    Body Systems:    gross symmetry  ROM  strength  gross coordinated movement  balance  gait  transfers  transitions  motor control  motor learning    Participation Restrictions:   Independent Community Dweller    Activity limitations:   Learning and applying knowledge  no deficits    General Tasks and Commands  no deficits    Communication  no deficits    Mobility  lifting and carrying objects  walking  driving (bike, car, motorcycle)    Self care  washing oneself (bathing, drying, washing hands)  caring for body parts (brushing teeth, shaving, grooming)  dressing    Domestic Life  shopping  cooking  doing house work (cleaning house, washing dishes, laundry)  assisting others    Interactions/Relationships  no deficits    Life Areas  employment    Community and Social Life  community life  recreation and leisure         low   Clinical Presentation stable and uncomplicated low   Decision Making/ Complexity Score: low     Goals:  Goals:  Short Term Goals: 2 weeks   1.) Patient will demonstrate independence in compliance and technique of home exercise program provided as per teach-back method of assessment.  2.) Patient will achieve 0 degrees of active and passive knee extension so as to improve normal terminal knee extension during ambulation.  3.) Patient will achieve 90 degrees of knee flexion to demonstrate improvement in functional knee range of motion.  4.) Patient will complete 10 straight-leg raises without knee extension lag to demonstrate improved quadriceps function and endurance.  5.) Patient will demonstrate a 10% improvement as per the FOTO.    Long Term Goals: 6 weeks   1.) Patient will demonstrate independence in compliance and technique of home exercise program provided as per teach-back method of assessment.  2.) Patient will achieve and maintain 0 degrees of active and passive knee extension so as to improve normal terminal knee extension during ambulation.  3.) Patient will achieve 120  degrees of knee flexion to demonstrate improvement in functional knee range of motion.  4.) Patient will ambulate for 300 feet with LRAD and with normal gait mechanics to demonstrate improved functional mobility.  5.) Patient will demonstrate a 30% improvement as per the FOTO.    PLAN   Plan of care Certification: 1/10/2023 to 3/1/2023.    Outpatient Physical Therapy 3 times weekly for 2 weeks followed by 2 times weekly for 4 weeks to include the following interventions: Manual Therapy, Moist Heat/ Ice, Neuromuscular Re-ed, Patient Education, Self Care, Therapeutic Activities, and Therapeutic Exercise.     Becky Ayon PT, DPT  Board Certified in Orthopedic Physical Therapy        I CERTIFY THE NEED FOR THESE SERVICES FURNISHED UNDER THIS PLAN OF TREATMENT AND WHILE UNDER MY CARE   Physician's comments:     Physician's Signature: ___________________________________________________

## 2023-01-10 NOTE — PROGRESS NOTES
[facility]   Physical Therapy Initial Evaluation     Date: 1/10/2023   Name: Sy Aguiar  Clinic Number: [id number]    Therapy Diagnosis:   Encounter Diagnoses   Name Primary?    Chronic pain of left knee     Primary osteoarthritis of left knee     Quadriceps weakness      Physician: Urbano Vigil MD    Physician Orders: PT Eval and Treat   Medical Diagnosis from Referral:   M25.562,G89.29 (ICD-10-CM) - Chronic pain of left knee   M17.12 (ICD-10-CM) - Primary osteoarthritis of left knee   M62.81 (ICD-10-CM) - Quadriceps weakness   I10 (ICD-10-CM) - Hypertension, unspecified type     Evaluation Date: 1/10/2023  Authorization Period Expiration: None  Plan of Care Expiration: 3/1/2023  Progress Note Due: 2/9/2022  Visit # / Visits authorized: 1/ 1   FOTO: 1/3    Precautions: Standard, HTN **History of Left Quad Tendon Repair**    Time In: 2:03 pm  Time Out: 3:00 pm  Total Appointment Time (timed & untimed codes): 57 minutes  SUBJECTIVE   Date of onset: 1/9/23  History of current condition - Sy reports: undergoing a total knee arthoplasty with Dr. Vigil yesterday. He was discharged from the hospital on 1/9/23, same day. He has had one bowel movement since the surgery and is not having any headaches, calf pain, or difficulty walking. He is not having any numbness and tingling.  Falls: None  Imaging, Radiographs: Interval left knee arthroplasty.  Hardware projects in appropriate alignment.  Scattered subcutaneous and intra-articular gas, likely postprocedural.     Prior Therapy: None  Social History: Lives alone (niece lives in [facility] and a friend across the street). Elevator in his apartment to the second floor, and apartment is one level without stairs. He has a shower chair with grab bars in the shower, which is a tub-shower combination.   Occupation: Professional Musician (Bass Player)- Sitting  Prior Level of Function: Independent  Current Level of Function: Impairments  "consistent with post-operative status.    Pain:  Current 5/10 (stiffness), worst 5/10, best 0/10   Location: left knee   Description: Aching and Dull  Aggravating Factors: Bending, Walking, and Lifting  Easing Factors: pain medication, ice, and rest    Patients goals: "I wanna get well so that I can get on my flight."     Medical History:   Past Medical History:   Diagnosis Date    Asthma     Hypertension      Surgical History:   Sy Aguiar  has a past surgical history that includes Back surgery; Elbow surgery (Right); and Knee surgery.    Medications:   Sy has a current medication list which includes the following prescription(s): acetaminophen, aspirin, cephalexin, diclofenac, docusate sodium, famotidine, losartan, sertraline, and trazodone, and the following Facility-Administered Medications: lactated ringers.    Allergies:   Review of patient's allergies indicates:  No Known Allergies   OBJECTIVE   Vitals  Heart Rate: 66 bpm  Blood Pressure: 117/76 mmHg  Ambulation: Sy ambulates with a walker and with good gait stride and full terminal knee extension with a step-through gait pattern, with slight forward flexed posture.    Sitting to Standing: Sy attempts to maintain left knee slightly extended and off-weighted during sitting to standing transfer with bilateral upper extremity assistance used in a safe manner on stable surface.    Integumentary: Sy demonstrates a clean and intact bandage without any evidence of bleeding. He is noted to have multiple oval-shaped wounds on the medial aspect of the knee, that the patient reports is from part of the operation.    Circumference:   Right Left   Knee Joint Line - 54.7 cm   10 cm below 39.2 cm 46.3 cm (at edge of bandage)     Knee Active Range of Motion   Right Left   Flexion - 90 degrees   Extension - 0 degrees     Knee Passive Range of Motion   Right  Left End-Feel   Flexion  90 degrees Not pressed   Extension  0 degrees Swollen     **Quad Set: " "Poor; Utilizes co-contraction of gluteal muscles without any trace of quadriceps contraction. Tactile stimulation as well as contraction of opposite quadriceps with repetition allowed improved recruitment of the left quadriceps and this achieved status of fair to good by the end of session.  Joint Mobility  Tibiofemoral A/P [place] -   Tibiofemoral P/A [place] -   Patellofemoral Joint Mobility Within normal limits       Limitation/Restriction for FOTO Knee Survey    Therapist reviewed FOTO scores for Sy Aguiar on 1/10/2023.   FOTO documents entered into [employer] - see Media section.    Limitation Score: 48%         TREATMENT     Total Treatment time (time-based codes) separate from Evaluation: 30 minutes    Sy received the treatments listed below:    therapeutic exercises to develop strength, endurance, ROM, flexibility, posture, and core stabilization for 15 minutes including:    Knee Extension with Prop, 3 minutes  Glute Sets, 10x5" hold  Heel Slides with Dorsiflexion (X5), 10x  Ankle Pumps, 30x  Short-Arc Quad, 2x10, 3" hold    manual therapy techniques: Joint mobilizations were applied for 5 minutes, including:  Patellar Mobilizations, all planes    neuromuscular re-education activities to improve: Kinesthetic and Sense for 10 minutes. The following activities were included:  Quad Sets, bilateral for neuromuscular recruitment, 20x3" hold    cold pack for 10 minutes to right knee.    PATIENT EDUCATION AND HOME EXERCISES     Education provided:   - -Findings of evaluation and examination, and affect of these on plan for treatment  -Prognosis and expectations  -Role of PT and team-centered care for patient  -Home exercise program and expectations of therapy  -[facility] and PT/PTA treatment  - Importance of compliance with exercises at home to facilitate a desired outcome of surgery  -Signs/Symptoms of DVT    Written Home Exercises Provided: yes. Exercises were reviewed and Sy was able " to demonstrate them prior to the end of the session.  Sy demonstrated good  understanding of the education provided. See EMR under Patient Instructions for exercises provided during therapy sessions.    ASSESSMENT     Sy is a 67 y.o. male referred to outpatient Physical Therapy with a medical diagnosis of   M25.562,G89.29 (ICD-10-CM) - Chronic pain of left knee   M17.12 (ICD-10-CM) - Primary osteoarthritis of left knee   M62.81 (ICD-10-CM) - Quadriceps weakness   I10 (ICD-10-CM) - Hypertension, unspecified type   . Patient presents with limitations consistent with post-operative limitations in ambulation, sitting to standing transfer, and step-navigation due to post-operative edema, decreased active and passive range of motion, albeit the patient demonstrates range of motion that is on target with goals of full, terminal extension and 90 degrees of flexion the day after surgery. He had some difficulty in achieving quadriceps set without co-contraction of glute, however in cristiana opposite knee, this aided the patient in being able to isolate quadriceps contraction with fair to good quality.    Patient prognosis is Good.   Patient will benefit from skilled outpatient Physical Therapy to address the deficits stated above and in the chart below, provide patient /family education, and to maximize patientt's level of independence.     Plan of care discussed with patient: Yes  Patient's spiritual, cultural and educational needs considered and patient is agreeable to the plan of care and goals as stated below:     Anticipated Barriers for therapy: None    Medical Necessity is demonstrated by the following  History  Co-morbidities and personal factors that may impact the plan of care Co-morbidities:   HTN    Personal Factors:   no deficits     low   Examination  Body Structures and Functions, activity limitations and participation restrictions that may impact the plan of care Body Regions:   lower  extremities    Body Systems:    gross symmetry  ROM  strength  gross coordinated movement  balance  gait  transfers  transitions  motor control  motor learning    Participation Restrictions:   Independent Community Dweller    Activity limitations:   Learning and applying knowledge  no deficits    General Tasks and Commands  no deficits    Communication  no deficits    Mobility  lifting and carrying objects  walking  driving (bike, car, motorcycle)    Self care  washing oneself (bathing, drying, washing hands)  caring for body parts (brushing teeth, shaving, grooming)  dressing    Domestic Life  shopping  cooking  doing house work (cleaning house, washing dishes, laundry)  assisting others    Interactions/Relationships  no deficits    Life Areas  employment    Community and Social Life  community life  recreation and leisure         low   Clinical Presentation stable and uncomplicated low   Decision Making/ Complexity Score: low     Goals:  Goals:  Short Term Goals: 2 weeks   1.) Patient will demonstrate independence in compliance and technique of home exercise program provided as per teach-back method of assessment.  2.) Patient will achieve 0 degrees of active and passive knee extension so as to improve normal terminal knee extension during ambulation.  3.) Patient will achieve 90 degrees of knee flexion to demonstrate improvement in functional knee range of motion.  4.) Patient will complete 10 straight-leg raises without knee extension lag to demonstrate improved quadriceps function and endurance.  5.) Patient will demonstrate a 10% improvement as per the FOTO.    Long Term Goals: 6 weeks   1.) Patient will demonstrate independence in compliance and technique of home exercise program provided as per teach-back method of assessment.  2.) Patient will achieve and maintain 0 degrees of active and passive knee extension so as to improve normal terminal knee extension during ambulation.  3.) Patient will achieve 120  degrees of knee flexion to demonstrate improvement in functional knee range of motion.  4.) Patient will ambulate for 300 feet with LRAD and with normal gait mechanics to demonstrate improved functional mobility.  5.) Patient will demonstrate a 30% improvement as per the FOTO.    PLAN   Plan of care Certification: 1/10/2023 to 3/1/2023.    Outpatient Physical Therapy 3 times weekly for 2 weeks followed by 2 times weekly for 4 weeks to include the following interventions: Manual Therapy, Moist Heat/ Ice, Neuromuscular Re-ed, Patient Education, Self Care, Therapeutic Activities, and Therapeutic Exercise.     Becky Ayon PT, DPT  Board Certified in Orthopedic Physical Therapy        I CERTIFY THE NEED FOR THESE SERVICES FURNISHED UNDER THIS PLAN OF TREATMENT AND WHILE UNDER MY CARE   Physician's comments:     Physician's Signature: ___________________________________________________

## 2023-01-11 ENCOUNTER — CLINICAL SUPPORT (OUTPATIENT)
Dept: REHABILITATION | Facility: HOSPITAL | Age: 68
End: 2023-01-11
Attending: ORTHOPAEDIC SURGERY
Payer: MEDICARE

## 2023-01-11 DIAGNOSIS — M62.81 QUADRICEPS WEAKNESS: ICD-10-CM

## 2023-01-11 DIAGNOSIS — M17.12 PRIMARY OSTEOARTHRITIS OF LEFT KNEE: Primary | ICD-10-CM

## 2023-01-11 PROCEDURE — 97140 MANUAL THERAPY 1/> REGIONS: CPT | Mod: PO,CQ

## 2023-01-11 PROCEDURE — 97112 NEUROMUSCULAR REEDUCATION: CPT | Mod: PO,CQ

## 2023-01-11 PROCEDURE — 97110 THERAPEUTIC EXERCISES: CPT | Mod: PO,CQ

## 2023-01-11 PROCEDURE — 97010 HOT OR COLD PACKS THERAPY: CPT | Mod: PO,CQ

## 2023-01-11 NOTE — PROGRESS NOTES
"[facility]   Physical Therapy Treatment Note     Name: Sy Aguiar  [facility] Number: [id number]    Therapy Diagnosis:   Encounter Diagnoses   Name Primary?    Primary osteoarthritis of left knee Yes    Quadriceps weakness      Physician: Urbano Vigil MD    Visit Date: 1/11/2023    Physician Orders: PT Eval and Treat   Medical Diagnosis from Referral:   M25.562,G89.29 (ICD-10-CM) - Chronic pain of left knee   M17.12 (ICD-10-CM) - Primary osteoarthritis of left knee   M62.81 (ICD-10-CM) - Quadriceps weakness   I10 (ICD-10-CM) - Hypertension, unspecified type      Evaluation Date: 1/10/2023  Authorization Period Expiration: None  Plan of Care Expiration: 3/1/2023  Progress Note Due: 2/9/2022  Visit # / Visits authorized: 1/20  FOTO: 1/3     Precautions: Standard, HTN **History of Left Quad Tendon Repair**    PTA Visit #: 1/5     Time In: 2:45 PM  Time Out: 3:35 PM  Total Billable Time: 40 minutes (+ 10' modalities)     SUBJECTIVE     Pt reports: "No pain at all, not even any aching" He reports he even drove to therapy today and is not on any narcotic medication.  He was compliant with home exercise program.  Response to previous treatment: minimal soreness  Functional change: returned to driving short distances    Pain: 0/10  Location: left knee      OBJECTIVE     Objective Measures updated at progress report unless specified.     Treatment     Sy received the treatments listed below:      therapeutic exercises to develop strength, endurance, ROM, flexibility, posture, and core stabilization for 20 minutes including:  Nu-step 5', level 2  Knee Extension with Prop, 3 minutes with 2# at knee  Heel Slides with Dorsiflexion 2x10, 5" hold  Heel raises 2x10  Standing knee flexion 2x10  Standing hip flexion 2x10  Standing hip abudction: add NV     manual therapy techniques: Joint mobilizations were applied for 10 minutes, including:  Patellar Mobilizations, all planes  Tibfib Mobilizations, " "anterior/posterior/rotation     neuromuscular re-education activities to improve: Kinesthetic, Proprioception, and Sense for 10 minutes. The following activities were included:  Quad Sets, bilateral for neuromuscular recruitment, 20x3" hold  Glute Sets, 10x5" hold  Short-Arc Quad, 2x10, 3" hold     cold pack for 10 minutes to left knee.    Patient Education and Home Exercises     Home Exercises Provided and Patient Education Provided     Education provided:   - in new exercises, sleeping instruction, and driving safety    Written Home Exercises Provided: Patient instructed to cont prior HEP. Exercises were reviewed and Sy was able to demonstrate them prior to the end of the session.  Sy demonstrated good  understanding of the education provided. See EMR under Patient Instructions for exercises provided during therapy sessions    ASSESSMENT     Pt tolerates his program very well this date and is able to make multiple ROM, strengthening, and functional progressions as we work towards achieving his therapy goals. Mild flexibility gains present upon completion of manuals, but still remains limited into his end ranges due to joint tightness and effusion.     Sy Is progressing well towards his goals.   Pt prognosis is Excellent.     Pt will continue to benefit from skilled outpatient physical therapy to address the deficits listed in the problem list box on initial evaluation, provide pt/family education and to maximize pt's level of independence in the home and community environment.     Pt's spiritual, cultural and educational needs considered and pt agreeable to plan of care and goals.     Anticipated barriers to physical therapy: None    Goals:  Short Term Goals: 2 weeks   1.) Patient will demonstrate independence in compliance and technique of home exercise program provided as per teach-back method of assessment.  2.) Patient will achieve 0 degrees of active and passive knee extension so as to improve normal " terminal knee extension during ambulation.  3.) Patient will achieve 90 degrees of knee flexion to demonstrate improvement in functional knee range of motion.  4.) Patient will complete 10 straight-leg raises without knee extension lag to demonstrate improved quadriceps function and endurance.  5.) Patient will demonstrate a 10% improvement as per the FOTO.     Long Term Goals: 6 weeks   1.) Patient will demonstrate independence in compliance and technique of home exercise program provided as per teach-back method of assessment.  2.) Patient will achieve and maintain 0 degrees of active and passive knee extension so as to improve normal terminal knee extension during ambulation.  3.) Patient will achieve 120 degrees of knee flexion to demonstrate improvement in functional knee range of motion.  4.) Patient will ambulate for 300 feet with LRAD and with normal gait mechanics to demonstrate improved functional mobility.  5.) Patient will demonstrate a 30% improvement as per the FOTO.    PLAN     Continue within POC and progress as pt tolerates.     Nati Pepe, PTA

## 2023-01-13 ENCOUNTER — CLINICAL SUPPORT (OUTPATIENT)
Dept: REHABILITATION | Facility: HOSPITAL | Age: 68
End: 2023-01-13
Payer: MEDICARE

## 2023-01-13 DIAGNOSIS — M62.81 QUADRICEPS WEAKNESS: ICD-10-CM

## 2023-01-13 DIAGNOSIS — M17.12 PRIMARY OSTEOARTHRITIS OF LEFT KNEE: Primary | ICD-10-CM

## 2023-01-13 PROCEDURE — 97010 HOT OR COLD PACKS THERAPY: CPT | Mod: PO,CQ

## 2023-01-13 PROCEDURE — 97110 THERAPEUTIC EXERCISES: CPT | Mod: PO,CQ

## 2023-01-13 PROCEDURE — 97140 MANUAL THERAPY 1/> REGIONS: CPT | Mod: PO,CQ

## 2023-01-13 PROCEDURE — 97112 NEUROMUSCULAR REEDUCATION: CPT | Mod: PO,CQ

## 2023-01-13 NOTE — PROGRESS NOTES
"[facility]   Physical Therapy Treatment Note     Name: Sy Aguiar  Clinic Number: [id number]    Therapy Diagnosis:   Encounter Diagnoses   Name Primary?    Primary osteoarthritis of left knee Yes    Quadriceps weakness      Physician: Urbano Vigil MD    Visit Date: 1/13/2023    Physician Orders: PT Eval and Treat   Medical Diagnosis from Referral:   M25.562,G89.29 (ICD-10-CM) - Chronic pain of left knee   M17.12 (ICD-10-CM) - Primary osteoarthritis of left knee   M62.81 (ICD-10-CM) - Quadriceps weakness   I10 (ICD-10-CM) - Hypertension, unspecified type      Evaluation Date: 1/10/2023  Authorization Period Expiration: None  Plan of Care Expiration: 3/1/2023  Progress Note Due: 2/9/2022  Visit # / Visits authorized: 2/20  FOTO: 1/3     Precautions: Standard, HTN **History of Left Quad Tendon Repair**    PTA Visit #: 2/5     Time In: 1:45 PM  Time Out:  2:45 PM  Total Billable Time: 60 minutes (including 10' modalities)     SUBJECTIVE     Pt reports: "I felt a little depressed yesterday, I'm not sure if that's from the pain medication, but I kind of remember feeling this way with my last knee too."  He was compliant with home exercise program.  Response to previous treatment: no complaints  Functional change: Starting to use SPC at home    Pain: 2/10  Location: left knee      OBJECTIVE     Objective Measures updated at progress report unless specified.     Treatment     Sy received the treatments listed below:      therapeutic exercises to develop strength, endurance, ROM, flexibility, posture, and core stabilization for 20 minutes including:  Nu-step 6', level 2  Knee Extension with heel prop, 3 minutes with 2# at knee  Heel Slides with Dorsiflexion 2x10, 5" hold  Heel raises 2x10  Standing knee flexion 2x10  Standing hip flexion 2# 2x10  Standing hip abduction 2# 2x10     manual therapy techniques: Joint mobilizations were applied for 10 minutes, including:  Patellar " "Mobilizations, all planes  Tibfib Mobilizations, AP/PA/ER     neuromuscular re-education activities to improve: Kinesthetic, Proprioception, and Sense for 8 minutes. The following activities were included:  Quad Sets, bilateral for neuromuscular recruitment, 20x3" hold  Short-Arc Quad, 2x10, 3" hold  TKE with GTB  2x10, 3" hold    therapeutic activities to improve functional performance for 8 minutes, including:  Front step up 4" 2x10  Sit<>stand 1x10    gait training to improve functional mobility and safety for 4 minutes, including:  Ambulating in clinic w/ SPC and minimal SBA  Instruction/education regarding continuation of RW for safety; use of AD around the home      cold pack for 10 minutes to left knee.    Patient Education and Home Exercises     Home Exercises Provided and Patient Education Provided     Education provided:   - in new exercises, gait safety, HEP updated    Written Home Exercises Provided: Patient instructed to cont prior HEP. Exercises were reviewed and Sy was able to demonstrate them prior to the end of the session.  Sy demonstrated good  understanding of the education provided. See EMR under Patient Instructions for exercises provided during therapy sessions.     ASSESSMENT   Patient demonstrates ongoing end range tightness and joint effusion which contributes to lack of AROM into knee flexion. He presents with poor body mechanics with a sit<>stand, demonstrating propped out knee extension with this transfer. Instructed to use his full available ROM to complete this appropriately to maximize his quad strength and overall function.     Sy Is progressing well towards his goals.   Pt prognosis is Excellent.     Pt will continue to benefit from skilled outpatient physical therapy to address the deficits listed in the problem list box on initial evaluation, provide pt/family education and to maximize pt's level of independence in the home and community environment.     Pt's spiritual, " cultural and educational needs considered and pt agreeable to plan of care and goals.     Anticipated barriers to physical therapy: None    Goals:  Short Term Goals: 2 weeks   1.) Patient will demonstrate independence in compliance and technique of home exercise program provided as per teach-back method of assessment.  2.) Patient will achieve 0 degrees of active and passive knee extension so as to improve normal terminal knee extension during ambulation.  3.) Patient will achieve 90 degrees of knee flexion to demonstrate improvement in functional knee range of motion.  4.) Patient will complete 10 straight-leg raises without knee extension lag to demonstrate improved quadriceps function and endurance.  5.) Patient will demonstrate a 10% improvement as per the FOTO.     Long Term Goals: 6 weeks   1.) Patient will demonstrate independence in compliance and technique of home exercise program provided as per teach-back method of assessment.  2.) Patient will achieve and maintain 0 degrees of active and passive knee extension so as to improve normal terminal knee extension during ambulation.  3.) Patient will achieve 120 degrees of knee flexion to demonstrate improvement in functional knee range of motion.  4.) Patient will ambulate for 300 feet with LRAD and with normal gait mechanics to demonstrate improved functional mobility.  5.) Patient will demonstrate a 30% improvement as per the FOTO.    PLAN     Continue within POC and progress as pt tolerates.     Nati Pepe, PTA

## 2023-01-17 ENCOUNTER — CLINICAL SUPPORT (OUTPATIENT)
Dept: REHABILITATION | Facility: HOSPITAL | Age: 68
End: 2023-01-17
Payer: MEDICARE

## 2023-01-17 DIAGNOSIS — M17.12 PRIMARY OSTEOARTHRITIS OF LEFT KNEE: Primary | ICD-10-CM

## 2023-01-17 DIAGNOSIS — M62.81 QUADRICEPS WEAKNESS: ICD-10-CM

## 2023-01-17 PROCEDURE — 97010 HOT OR COLD PACKS THERAPY: CPT | Mod: PO,CQ

## 2023-01-17 PROCEDURE — 97140 MANUAL THERAPY 1/> REGIONS: CPT | Mod: PO,CQ

## 2023-01-17 PROCEDURE — 97112 NEUROMUSCULAR REEDUCATION: CPT | Mod: PO,CQ

## 2023-01-17 PROCEDURE — 97110 THERAPEUTIC EXERCISES: CPT | Mod: PO,CQ

## 2023-01-17 NOTE — PROGRESS NOTES
"[facility]   Physical Therapy Treatment Note     Name: Sy Aguiar  Clinic Number: [id number]    Therapy Diagnosis:   Encounter Diagnoses   Name Primary?    Primary osteoarthritis of left knee Yes    Quadriceps weakness      Physician: Urbano Vigil MD    Visit Date: 1/17/2023    Physician Orders: PT Eval and Treat   Medical Diagnosis from Referral:   M25.562,G89.29 (ICD-10-CM) - Chronic pain of left knee   M17.12 (ICD-10-CM) - Primary osteoarthritis of left knee   M62.81 (ICD-10-CM) - Quadriceps weakness   I10 (ICD-10-CM) - Hypertension, unspecified type      Evaluation Date: 1/10/2023  Authorization Period Expiration: None  Plan of Care Expiration: 3/1/2023  Progress Note Due: 2/9/2022  Visit # / Visits authorized: 3/20  FOTO: 1/3     Precautions: Standard, HTN **History of Left Quad Tendon Repair**    PTA Visit #: 3/5     Time In: 2:45 PM  Time Out:  3:38 PM  Total Billable Time: 53 minutes (including 10' modalities)     SUBJECTIVE     Pt reports: "It feels good getting back to normal. [place]."  He was partially compliant with home exercise program.  Response to previous treatment: no complaints  Functional change: n/a    Pain: 0/10  Location: left knee      OBJECTIVE     Objective Measures updated at progress report unless specified.     Treatment     Sy received the treatments listed below:      therapeutic exercises to develop strength, endurance, ROM, flexibility, posture, and core stabilization for 15 minutes including:  Nu-step 5', level 2  Knee Extension with heel prop, 3 minutes with 3# at knee  Heel Slides with Dorsiflexion 1x10, 5" hold  Standing knee flexion 2# 2x10  Standing hip flexion 2# 2x10  Standing hip abduction 2# 2x10     manual therapy techniques: Joint mobilizations were applied for 12 minutes, including:  Patellar Mobilizations, all planes  Tibfib Mobilizations, AP/PA/ER  PROM knee flexion/extension   " "  neuromuscular re-education activities to improve: Kinesthetic, Proprioception, and Sense for 8 minutes. The following activities were included:  Quad Sets, bilateral for neuromuscular recruitment, 10x3" hold  Long Arc Quad, 2x10, 3" hold  TKE with GTB  2x10, 3" hold    therapeutic activities to improve functional performance for 8 minutes, including:  Front step up 6" 2x10  Sit<>stand 1x10    cold pack for 10 minutes to left knee.    Not completed  Heel raises 2x10 (but included in HEP)    Patient Education and Home Exercises     Home Exercises Provided and Patient Education Provided     Education provided:   - in scar tissue mobilization and healing of incision    Written Home Exercises Provided: Patient instructed to cont prior HEP. Exercises were reviewed and Sy was able to demonstrate them prior to the end of the session.  Sy demonstrated good  understanding of the education provided. See EMR under Patient Instructions for exercises provided during therapy sessions.     ASSESSMENT   Removed aquacel dressing this date: incision appears to be healing appropriately without signs of infection. However, this is a small area at the distal portion that shows blistering. He was instructed in what to avoid until his incision is fully healed. Again, good improvement in left knee ROM and flexibility upon completion of manuals, but remains with end range tightness in which he would benefit from continuation of skilled treatment.     Sy Is progressing well towards his goals.   Pt prognosis is Excellent.     Pt will continue to benefit from skilled outpatient physical therapy to address the deficits listed in the problem list box on initial evaluation, provide pt/family education and to maximize pt's level of independence in the home and community environment.     Pt's spiritual, cultural and educational needs considered and pt agreeable to plan of care and goals.     Anticipated barriers to physical therapy: " None    Goals:  Short Term Goals: 2 weeks   1.) Patient will demonstrate independence in compliance and technique of home exercise program provided as per teach-back method of assessment.  2.) Patient will achieve 0 degrees of active and passive knee extension so as to improve normal terminal knee extension during ambulation.  3.) Patient will achieve 90 degrees of knee flexion to demonstrate improvement in functional knee range of motion.  4.) Patient will complete 10 straight-leg raises without knee extension lag to demonstrate improved quadriceps function and endurance.  5.) Patient will demonstrate a 10% improvement as per the FOTO.     Long Term Goals: 6 weeks   1.) Patient will demonstrate independence in compliance and technique of home exercise program provided as per teach-back method of assessment.  2.) Patient will achieve and maintain 0 degrees of active and passive knee extension so as to improve normal terminal knee extension during ambulation.  3.) Patient will achieve 120 degrees of knee flexion to demonstrate improvement in functional knee range of motion.  4.) Patient will ambulate for 300 feet with LRAD and with normal gait mechanics to demonstrate improved functional mobility.  5.) Patient will demonstrate a 30% improvement as per the FOTO.    PLAN     Continue within POC and progress as pt tolerates.     Nati Pepe, PTA

## 2023-01-18 ENCOUNTER — CLINICAL SUPPORT (OUTPATIENT)
Dept: REHABILITATION | Facility: HOSPITAL | Age: 68
End: 2023-01-18
Payer: MEDICARE

## 2023-01-18 DIAGNOSIS — M17.12 PRIMARY OSTEOARTHRITIS OF LEFT KNEE: Primary | ICD-10-CM

## 2023-01-18 DIAGNOSIS — M62.81 QUADRICEPS WEAKNESS: ICD-10-CM

## 2023-01-18 PROCEDURE — 97140 MANUAL THERAPY 1/> REGIONS: CPT | Mod: PO,CQ

## 2023-01-18 PROCEDURE — 97112 NEUROMUSCULAR REEDUCATION: CPT | Mod: PO,CQ

## 2023-01-18 PROCEDURE — 97110 THERAPEUTIC EXERCISES: CPT | Mod: PO,CQ

## 2023-01-18 PROCEDURE — 97010 HOT OR COLD PACKS THERAPY: CPT | Mod: PO,CQ

## 2023-01-18 NOTE — PROGRESS NOTES
"[facility]   Physical Therapy Treatment Note     Name: Sy Aguiar  Clinic Number: [id number]    Therapy Diagnosis:   Encounter Diagnoses   Name Primary?    Primary osteoarthritis of left knee Yes    Quadriceps weakness      Physician: Urbano Vigil MD    Visit Date: 1/18/2023    Physician Orders: PT Eval and Treat   Medical Diagnosis from Referral:   M25.562,G89.29 (ICD-10-CM) - Chronic pain of left knee   M17.12 (ICD-10-CM) - Primary osteoarthritis of left knee   M62.81 (ICD-10-CM) - Quadriceps weakness   I10 (ICD-10-CM) - Hypertension, unspecified type      Evaluation Date: 1/10/2023  Authorization Period Expiration: None  Plan of Care Expiration: 3/1/2023  Progress Note Due: 2/9/2022  Visit # / Visits authorized: 4/11  FOTO: 1/3     Precautions: Standard, HTN **History of Left Quad Tendon Repair**    PTA Visit #: 4/5     Time In: 2:00 PM  Time Out:  2:50 PM  Total Billable Time: 50 minutes (including 10' modalities)     SUBJECTIVE     Pt reports: "My knee is more stiff than it is painful."  He was compliant with his home exercise program.  Response to previous treatment: mild discomfort  Functional change: progressing    Pain: 0/10  Location: left knee      OBJECTIVE     Objective Measures updated at progress report unless specified.     Treatment     Sy received the treatments listed below:      therapeutic exercises to develop strength, endurance, ROM, flexibility, posture, and core stabilization for 14 minutes including:  Recumbent bike 4' (front and backwards for ROM)  Knee Extension with heel prop, 3 minutes with 3# at knee  Heel Slides with Dorsiflexion 20x5" hold  Standing knee flexion 2# 2x10  Standing hip flexion 2# 2x10  Standing hip abduction 2# 2x10  Leg press 60# 20x      manual therapy techniques: Joint mobilizations were applied for 10 minutes, including:  Patellar Mobilizations, all planes  Tibfib Mobilizations, AP/PA/ER  Knee overpressure into flexion and " "extension     neuromuscular re-education activities to improve: Kinesthetic, Proprioception, and Sense for 8 minutes. The following activities were included:  Quad Sets 10x3" hold  Long Arc Quad, 2x10, 3" hold  TKE with GTB  2x10, 3" hold    therapeutic activities to improve functional performance for 8 minutes, including:  Front step up 6" 2x10  Sit<>stand 1x10    cold pack for 10 minutes to left knee.    Patient Education and Home Exercises     Home Exercises Provided and Patient Education Provided     Education provided:   - in proper elevation of the LLE and possible cessation of elevation during sleep    Written Home Exercises Provided: Patient instructed to cont prior HEP. Exercises were reviewed and Sy was able to demonstrate them prior to the end of the session.  Sy demonstrated good  understanding of the education provided. See EMR under Patient Instructions for exercises provided during therapy sessions.     ASSESSMENT   Patient able to achieve further left knee flexibility gains on the recumbent bike, beginning with greater stiffness at first few intervals but quickly loosening up. He is progressing wonderfully with strengthening activities with appropriate difficulty and requires mild cuing for body mechanics to achieve maximal gains, responding well to cues and treatment.     Sy Is progressing well towards his goals.   Pt prognosis is Excellent.     Pt will continue to benefit from skilled outpatient physical therapy to address the deficits listed in the problem list box on initial evaluation, provide pt/family education and to maximize pt's level of independence in the home and community environment.     Pt's spiritual, cultural and educational needs considered and pt agreeable to plan of care and goals.     Anticipated barriers to physical therapy: None    Goals:  Short Term Goals: 2 weeks   1.) Patient will demonstrate independence in compliance and technique of home exercise program " provided as per teach-back method of assessment.  2.) Patient will achieve 0 degrees of active and passive knee extension so as to improve normal terminal knee extension during ambulation.  3.) Patient will achieve 90 degrees of knee flexion to demonstrate improvement in functional knee range of motion.  4.) Patient will complete 10 straight-leg raises without knee extension lag to demonstrate improved quadriceps function and endurance.  5.) Patient will demonstrate a 10% improvement as per the FOTO.     Long Term Goals: 6 weeks   1.) Patient will demonstrate independence in compliance and technique of home exercise program provided as per teach-back method of assessment.  2.) Patient will achieve and maintain 0 degrees of active and passive knee extension so as to improve normal terminal knee extension during ambulation.  3.) Patient will achieve 120 degrees of knee flexion to demonstrate improvement in functional knee range of motion.  4.) Patient will ambulate for 300 feet with LRAD and with normal gait mechanics to demonstrate improved functional mobility.  5.) Patient will demonstrate a 30% improvement as per the FOTO.    PLAN     Continue within POC and progress as pt tolerates.     Nati Pepe, PTA

## 2023-01-19 ENCOUNTER — OFFICE VISIT (OUTPATIENT)
Dept: ORTHOPEDICS | Facility: CLINIC | Age: 68
End: 2023-01-19
Payer: MEDICARE

## 2023-01-19 ENCOUNTER — CLINICAL SUPPORT (OUTPATIENT)
Dept: REHABILITATION | Facility: HOSPITAL | Age: 68
End: 2023-01-19
Payer: MEDICARE

## 2023-01-19 VITALS
DIASTOLIC BLOOD PRESSURE: 70 MMHG | HEIGHT: 72 IN | WEIGHT: 275 LBS | BODY MASS INDEX: 37.25 KG/M2 | HEART RATE: 60 BPM | SYSTOLIC BLOOD PRESSURE: 144 MMHG

## 2023-01-19 DIAGNOSIS — M17.12 PRIMARY OSTEOARTHRITIS OF LEFT KNEE: Primary | ICD-10-CM

## 2023-01-19 DIAGNOSIS — Z47.1 AFTERCARE FOLLOWING LEFT KNEE JOINT REPLACEMENT SURGERY: Primary | ICD-10-CM

## 2023-01-19 DIAGNOSIS — Z96.652 AFTERCARE FOLLOWING LEFT KNEE JOINT REPLACEMENT SURGERY: Primary | ICD-10-CM

## 2023-01-19 DIAGNOSIS — M62.81 QUADRICEPS WEAKNESS: ICD-10-CM

## 2023-01-19 PROCEDURE — 99999 PR PBB SHADOW E&M-EST. PATIENT-LVL III: ICD-10-PCS | Mod: PBBFAC,,, | Performed by: ORTHOPAEDIC SURGERY

## 2023-01-19 PROCEDURE — 97010 HOT OR COLD PACKS THERAPY: CPT | Mod: PO,CQ

## 2023-01-19 PROCEDURE — 3078F PR MOST RECENT DIASTOLIC BLOOD PRESSURE < 80 MM HG: ICD-10-PCS | Mod: CPTII,S$GLB,, | Performed by: ORTHOPAEDIC SURGERY

## 2023-01-19 PROCEDURE — 3008F PR BODY MASS INDEX (BMI) DOCUMENTED: ICD-10-PCS | Mod: CPTII,S$GLB,, | Performed by: ORTHOPAEDIC SURGERY

## 2023-01-19 PROCEDURE — 99999 PR PBB SHADOW E&M-EST. PATIENT-LVL III: CPT | Mod: PBBFAC,,, | Performed by: ORTHOPAEDIC SURGERY

## 2023-01-19 PROCEDURE — 1159F PR MEDICATION LIST DOCUMENTED IN MEDICAL RECORD: ICD-10-PCS | Mod: CPTII,S$GLB,, | Performed by: ORTHOPAEDIC SURGERY

## 2023-01-19 PROCEDURE — 1126F PR PAIN SEVERITY QUANTIFIED, NO PAIN PRESENT: ICD-10-PCS | Mod: CPTII,S$GLB,, | Performed by: ORTHOPAEDIC SURGERY

## 2023-01-19 PROCEDURE — 97140 MANUAL THERAPY 1/> REGIONS: CPT | Mod: PO,CQ

## 2023-01-19 PROCEDURE — 3078F DIAST BP <80 MM HG: CPT | Mod: CPTII,S$GLB,, | Performed by: ORTHOPAEDIC SURGERY

## 2023-01-19 PROCEDURE — 3288F FALL RISK ASSESSMENT DOCD: CPT | Mod: CPTII,S$GLB,, | Performed by: ORTHOPAEDIC SURGERY

## 2023-01-19 PROCEDURE — 1126F AMNT PAIN NOTED NONE PRSNT: CPT | Mod: CPTII,S$GLB,, | Performed by: ORTHOPAEDIC SURGERY

## 2023-01-19 PROCEDURE — 1101F PT FALLS ASSESS-DOCD LE1/YR: CPT | Mod: CPTII,S$GLB,, | Performed by: ORTHOPAEDIC SURGERY

## 2023-01-19 PROCEDURE — 3077F SYST BP >= 140 MM HG: CPT | Mod: CPTII,S$GLB,, | Performed by: ORTHOPAEDIC SURGERY

## 2023-01-19 PROCEDURE — 3008F BODY MASS INDEX DOCD: CPT | Mod: CPTII,S$GLB,, | Performed by: ORTHOPAEDIC SURGERY

## 2023-01-19 PROCEDURE — 3288F PR FALLS RISK ASSESSMENT DOCUMENTED: ICD-10-PCS | Mod: CPTII,S$GLB,, | Performed by: ORTHOPAEDIC SURGERY

## 2023-01-19 PROCEDURE — 3077F PR MOST RECENT SYSTOLIC BLOOD PRESSURE >= 140 MM HG: ICD-10-PCS | Mod: CPTII,S$GLB,, | Performed by: ORTHOPAEDIC SURGERY

## 2023-01-19 PROCEDURE — 97110 THERAPEUTIC EXERCISES: CPT | Mod: PO,CQ

## 2023-01-19 PROCEDURE — 1101F PR PT FALLS ASSESS DOC 0-1 FALLS W/OUT INJ PAST YR: ICD-10-PCS | Mod: CPTII,S$GLB,, | Performed by: ORTHOPAEDIC SURGERY

## 2023-01-19 PROCEDURE — 99024 POSTOP FOLLOW-UP VISIT: CPT | Mod: S$GLB,,, | Performed by: ORTHOPAEDIC SURGERY

## 2023-01-19 PROCEDURE — 99024 PR POST-OP FOLLOW-UP VISIT: ICD-10-PCS | Mod: S$GLB,,, | Performed by: ORTHOPAEDIC SURGERY

## 2023-01-19 PROCEDURE — 1159F MED LIST DOCD IN RCRD: CPT | Mod: CPTII,S$GLB,, | Performed by: ORTHOPAEDIC SURGERY

## 2023-01-19 PROCEDURE — 97530 THERAPEUTIC ACTIVITIES: CPT | Mod: PO,CQ

## 2023-01-19 NOTE — PROGRESS NOTES
"[facility]   Physical Therapy Treatment Note     Name: Sy Aguiar  Clinic Number: [id number]    Therapy Diagnosis:   Encounter Diagnoses   Name Primary?    Primary osteoarthritis of left knee Yes    Quadriceps weakness      Physician: Urbano Vigil MD    Visit Date: 1/19/2023    Physician Orders: PT Eval and Treat   Medical Diagnosis from Referral:   M25.562,G89.29 (ICD-10-CM) - Chronic pain of left knee   M17.12 (ICD-10-CM) - Primary osteoarthritis of left knee   M62.81 (ICD-10-CM) - Quadriceps weakness   I10 (ICD-10-CM) - Hypertension, unspecified type      Evaluation Date: 1/10/2023  Authorization Period Expiration: None  Plan of Care Expiration: 3/1/2023  Progress Note Due: 2/9/2022  Visit # / Visits authorized: 6/11  FOTO: 1/3     Precautions: Standard, HTN **History of Left Quad Tendon Repair**    PTA Visit #: 5/5     Time In: 4:05 PM  Time Out:  5:00 PM  Total Billable Time: 55 minutes (including 10' modalities)     SUBJECTIVE     Pt reports: he forgot something upstairs at his apartment and was able to go up two flights of steps in a reciprocal pattern, but decided to take the elevator down.   Patient also saw the doctor prior to his therapy appointment today, said everything is looking good and has another follow-up in 3 months.  He was compliant with his home exercise program.  Response to previous treatment: mild discomfort  Functional change: able to ascend steps normally     Pain: 0/10  Location: left knee      OBJECTIVE     Objective Measures updated at progress report unless specified.     Treatment     Sy received the treatments listed below:      therapeutic exercises to develop strength, endurance, ROM, flexibility, posture, and core stabilization for 17 minutes including:  Recumbent bike 4' (front and backwards for ROM)  Knee Extension with heel prop, 3 minutes with 3# at knee  Heel Slides with Dorsiflexion 20x5" hold  Standing knee flexion 2# 2x10  Standing hip " "flexion 2# 2x10  Standing hip abduction 2# 2x10  Leg press 60# 20x      manual therapy techniques: Joint mobilizations were applied for 10 minutes, including:  Patellar Mobilizations, all planes  Tibfib Mobilizations, AP/PA/ER  Knee overpressure into flexion and extension     neuromuscular re-education activities to improve: Kinesthetic, Proprioception, and Sense for 8minutes. The following activities were included:  Quad Sets 10x3" hold  Long Arc Quad, 2# 2x10, 3" hold  TKE with blue TB  2x10, 3" hold    therapeutic activities to improve functional performance for 10 minutes, including:  Step up and over 4" 2x10, 6" 1x10 (left up, down right)   SLS on floor (in // bars) 30" x 2  Sit<>stand 1x10 (not today)    cold pack for 10 minutes to left knee.    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 Exercises were reviewed and Sy was able to demonstrate them prior to the end of the session.  Sy demonstrated good  understanding of the education provided. See EMR under Patient Instructions for exercises provided during therapy sessions.     ASSESSMENT   Patient able to initiate a 4 inch step over this date but demonstrates poor eccentric quad control which contributes to lack of full active TKE. He is also particularly challenged with his SLS in which he requires fingertip support for stability. He would benefit from further skilled therapy to normalize his gait pattern and overall function.      Sy Is progressing well towards his goals.   Pt prognosis is Excellent.     Pt will continue to benefit from skilled outpatient physical therapy to address the deficits listed in the problem list box on initial evaluation, provide pt/family education and to maximize pt's level of independence in the " home and community environment.     Pt's spiritual, cultural and educational needs considered and pt agreeable to plan of care and goals.     Anticipated barriers to physical therapy: None    Goals:  Short Term Goals: 2 weeks   1.) Patient will demonstrate independence in compliance and technique of home exercise program provided as per teach-back method of assessment.  2.) Patient will achieve 0 degrees of active and passive knee extension so as to improve normal terminal knee extension during ambulation.  3.) Patient will achieve 90 degrees of knee flexion to demonstrate improvement in functional knee range of motion.  4.) Patient will complete 10 straight-leg raises without knee extension lag to demonstrate improved quadriceps function and endurance.  5.) Patient will demonstrate a 10% improvement as per the FOTO.     Long Term Goals: 6 weeks   1.) Patient will demonstrate independence in compliance and technique of home exercise program provided as per teach-back method of assessment.  2.) Patient will achieve and maintain 0 degrees of active and passive knee extension so as to improve normal terminal knee extension during ambulation.  3.) Patient will achieve 120 degrees of knee flexion to demonstrate improvement in functional knee range of motion.  4.) Patient will ambulate for 300 feet with LRAD and with normal gait mechanics to demonstrate improved functional mobility.  5.) Patient will demonstrate a 30% improvement as per the FOTO.    PLAN     Continue within POC and progress as pt tolerates.   MD f/u again on 4/11/23.    Nati Pepe, PTA

## 2023-01-19 NOTE — PROGRESS NOTES
Subjective:      Patient ID: Sy Aguiar is a 67 y.o. male.    Chief Complaint: Pain and Post-op Evaluation of the Left Knee    Patient is 2 weeks s/p  left primary total knee replacement  Anterior knee pain: no  Has improved pain  Is in physical therapy  yes  Problems w incision  no  Is  happy with result  yes  Opiod free: yes       Social History     Occupational History    Not on file   Tobacco Use    Smoking status: Former     Types: Cigarettes    Smokeless tobacco: Never   Substance and Sexual Activity    Alcohol use: Yes     Comment: socially    Drug use: Never    Sexual activity: Not on file      ROS      Objective:    General    Constitutional: He is oriented to person, place, and time. He appears well-developed and well-nourished.   HENT:   Head: Normocephalic and atraumatic.   Right Ear: External ear normal.   Left Ear: External ear normal.   Nose: Nose normal.   Eyes: Conjunctivae and EOM are normal. Right eye exhibits no discharge. Left eye exhibits no discharge. No scleral icterus.   Neck: No JVD present. No tracheal deviation present.   Cardiovascular:  Intact distal pulses.            Pulmonary/Chest: Effort normal and breath sounds normal. No stridor. No respiratory distress. He has no wheezes.   Abdominal: There is no guarding.   Neurological: He is alert and oriented to person, place, and time. He displays normal reflexes. He exhibits normal muscle tone. Coordination normal.   Psychiatric: He has a normal mood and affect. His behavior is normal. Judgment and thought content normal.     General Musculoskeletal Exam   Gait: abnormal and antalgic         Left Knee Exam     Inspection   Swelling: present  Effusion: present    Range of Motion   Extension:  0   Flexion:  110     Tests   Stability   MCL - Valgus: normal (0 to 2mm)  LCL - Varus: normal (0 to 2mm)    Other   Sensation: decreased    Comments:  Incision clean, dry intact    Muscle Strength   Left Lower Extremity   Quadriceps:  4/5    Hamstrin/5     Vascular Exam       Edema  Left Lower Leg: present       Assessment:       1. Aftercare following left knee joint replacement surgery          Plan:       Overall patient appears to be doing well and is happy with the result of the knee arthroplasty. They can continue activities as tolerated avoiding high impact activities.  I would like to see the patient back In 3 months with xrays.

## 2023-01-19 NOTE — PROGRESS NOTES
I assume primary medical responsibility for this patient. I have reviewed the history, physical, and assessement & treatment plan with the resident and agree that the care is reasonable and necessary. This service has been performed by a resident without the presence of a teaching physician under the primary care exception. If necessary, an addendum of additional findings or evaluation beyond the resident documentation will be noted below.     Ami Presley MD

## 2023-01-30 ENCOUNTER — CLINICAL SUPPORT (OUTPATIENT)
Dept: REHABILITATION | Facility: HOSPITAL | Age: 68
End: 2023-01-30
Attending: ORTHOPAEDIC SURGERY
Payer: MEDICARE

## 2023-01-30 ENCOUNTER — DOCUMENTATION ONLY (OUTPATIENT)
Dept: REHABILITATION | Facility: HOSPITAL | Age: 68
End: 2023-01-30

## 2023-01-30 DIAGNOSIS — M17.12 PRIMARY OSTEOARTHRITIS OF LEFT KNEE: Primary | ICD-10-CM

## 2023-01-30 DIAGNOSIS — M62.81 QUADRICEPS WEAKNESS: ICD-10-CM

## 2023-01-30 PROCEDURE — 97140 MANUAL THERAPY 1/> REGIONS: CPT | Mod: PO

## 2023-01-30 PROCEDURE — 97110 THERAPEUTIC EXERCISES: CPT | Mod: PO

## 2023-01-30 PROCEDURE — 97112 NEUROMUSCULAR REEDUCATION: CPT | Mod: PO

## 2023-01-30 NOTE — PROGRESS NOTES
PT/PTA met face to face to discuss pt's treatment plan and progress towards established goals. Pt will be seen by a physical therapist minimally every 6th visit or every 30 days.     Please see Updated Plan of Care dated 1/30/2023 for changes and updated goals.      Nati Pepe, PTA

## 2023-01-30 NOTE — PLAN OF CARE
"[facility]   Physical Therapy Treatment Note and Progress Note    Name: Sy Aguiar  Clinic Number: [id number]    Therapy Diagnosis:   Encounter Diagnoses   Name Primary?    Primary osteoarthritis of left knee Yes    Quadriceps weakness      Physician: Urbano Vigil MD  Visit Date: 1/30/2023  Physician Orders: PT Eval and Treat   Medical Diagnosis from Referral:   M25.562,G89.29 (ICD-10-CM) - Chronic pain of left knee   M17.12 (ICD-10-CM) - Primary osteoarthritis of left knee   M62.81 (ICD-10-CM) - Quadriceps weakness   I10 (ICD-10-CM) - Hypertension, unspecified type    Evaluation Date: 1/10/2023  Authorization Period Expiration: None  Plan of Care Expiration: 3/1/2023  Most Recent Progress Note: 1/30/2023  Visit # / Visits authorized: 6/11  FOTO: 1/3  Precautions: Standard, HTN **History of Left Quad Tendon Repair**  PTA Visit #: 5/5  Time In: 9:07 am  Time Out:  10:07 am  Total Billable Time: 60 minutes (including 10' modalities)   SUBJECTIVE   Pt reports: 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  He was somewhat compliant with his home exercise program.  Response to previous treatment: mild discomfort  Functional change: able to ascend steps normally   Pain: 0/10  Location: Left knee    OBJECTIVE      Left   Knee Flexion 105 degrees   Knee Extension -3 degrees   Ankle Dorsiflexion 0 degrees   Quadriceps Contraction: Good quality isometric contraction.  Straight-Leg Raise: 2-3 degree quadriceps lag noted during eccentric descending  Treatment     Sy received the treatments listed below:      therapeutic exercises to develop strength, endurance, ROM, flexibility, posture, and core stabilization for 26 minutes including:  Recumbent bike 4' (front and backwards for ROM)- " "Not today  Objective measures taken (15 minutes)  Knee Extension with Heel prop, 3 minutes with 3# above and below knee  Heel Slides with Dorsiflexion 2x10 x5" hold  Shuttle Double Leg Press 100# (50# loaded from the top and bottom) 2 minutes  Shuttle Single (L) Leg Press, 62.5#, 2 minutes  Sled-Pushing, 90#, down and back, 50 feet, 2 laps     manual therapy techniques: Joint mobilizations were applied for 18 minutes, including:  Patellar Mobilizations, all planes  Surgical Incision Mobilizations  Talocrural Anterior to Posterior Mobilizations, Grade III-IV  Talocrural Distraction Mobilizations  Knee Overpressure into Extension, 10x10" hold     neuromuscular re-education activities to improve: Kinesthetic, Proprioception, and Sense for 16 minutes. The following activities were included:  Quad Sets 10x3" hold  Short-Arc Quad, 2# cuff weight, 2x12, supine  Straight-Leg Raises, minimal active-assistance from physical therapist, 2x10  SLS on floor, 10" x 10"  Long Arc Quad, 2# 2x10, 3" hold (not today)  TKE with blue TB  2x10, 3" hold (not today)    therapeutic activities to improve functional performance for 00 minutes, including:          cold pack for 10 minutes to left knee.    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 Exercises were reviewed and Sy was able to demonstrate them prior to the end of the session.  Sy demonstrated good  understanding of the education provided. See EMR under Patient Instructions for exercises provided during therapy sessions.     ASSESSMENT   Patient has undergone 6 visits in the care of his left knee status-post total arthroscopy on 1/9/2023. As of today, he is 3 weeks status-post operation. At this time, his knee flexion is at 105 degrees with knee " extension lacking terminal range by~-3 degrees, which we have endorsed needs to continue to be prioritize to achieve full terminal extension. He demonstrates a good isometric quadriceps contraction, albeit demonstrates quadriceps lag during eccentric descending from a straight-leg raise, maintaining knee extension without a lag during concentric motion. Functional strength and balance continues to be progressed today with good tolerance. Scar mobility is limited at distal-most aspect of surgical incision. He remains a good candidate for skilled therapy to continue to address quadriceps weakness, terminal knee extension range of motion, and functional strengthening and balance.    Sy Is progressing well towards his goals.   Pt prognosis is Excellent.     Pt will continue to benefit from skilled outpatient physical therapy to address the deficits listed in the problem list box on initial evaluation, provide pt/family education and to maximize pt's level of independence in the home and community environment.     Pt's spiritual, cultural and educational needs considered and pt agreeable to plan of care and goals.     Anticipated barriers to physical therapy: None    Goals:  Short Term Goals: 2 weeks   1.) Patient will demonstrate independence in compliance and technique of home exercise program provided as per teach-back method of assessment. Met and Ongoing  2.) Patient will achieve 0 degrees of active and passive knee extension so as to improve normal terminal knee extension during ambulation. Ongoing  3.) Patient will achieve 90 degrees of knee flexion to demonstrate improvement in functional knee range of motion. Met  4.) Patient will complete 10 straight-leg raises without knee extension lag to demonstrate improved quadriceps function and endurance. Ongoing  5.) Patient will demonstrate a 10% improvement as per the FOTO. Ongoing     Long Term Goals: 6 weeks   1.) Patient will demonstrate independence in  compliance and technique of home exercise program provided as per teach-back method of assessment. Ongoing  2.) Patient will achieve and maintain 0 degrees of active and passive knee extension so as to improve normal terminal knee extension during ambulation. Ongoing  3.) Patient will achieve 120 degrees of knee flexion to demonstrate improvement in functional knee range of motion. Ongoing  4.) Patient will ambulate for 300 feet with LRAD and with normal gait mechanics to demonstrate improved functional mobility. Ongoing  5.) Patient will demonstrate a 30% improvement as per the FOTO. Ongoing    PLAN   Continue within POC and progress as pt tolerates.   MD f/u again on 4/11/23.    Becky Ayon PT, DPT  Board Certified in Orthopedic Physical Therapy

## 2023-01-30 NOTE — PROGRESS NOTES
"[facility]   Physical Therapy Treatment Note and Progress Note    Name: Sy Aguiar  Clinic Number: [id number]    Therapy Diagnosis:   Encounter Diagnoses   Name Primary?    Primary osteoarthritis of left knee Yes    Quadriceps weakness      Physician: Urbano Vigil MD  Visit Date: 1/30/2023  Physician Orders: PT Eval and Treat   Medical Diagnosis from Referral:   M25.562,G89.29 (ICD-10-CM) - Chronic pain of left knee   M17.12 (ICD-10-CM) - Primary osteoarthritis of left knee   M62.81 (ICD-10-CM) - Quadriceps weakness   I10 (ICD-10-CM) - Hypertension, unspecified type    Evaluation Date: 1/10/2023  Authorization Period Expiration: None  Plan of Care Expiration: 3/1/2023  Most Recent Progress Note: 1/30/2023  Visit # / Visits authorized: 6/11  FOTO: 1/3  Precautions: Standard, HTN **History of Left Quad Tendon Repair**  PTA Visit #: 5/5  Time In: 9:07 am  Time Out:  10:07 am  Total Billable Time: 60 minutes (including 10' modalities)   SUBJECTIVE   Pt reports: 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  He was somewhat compliant with his home exercise program.  Response to previous treatment: mild discomfort  Functional change: able to ascend steps normally   Pain: 0/10  Location: Left knee    OBJECTIVE      Left   Knee Flexion 105 degrees   Knee Extension -3 degrees   Ankle Dorsiflexion 0 degrees   Quadriceps Contraction: Good quality isometric contraction.  Straight-Leg Raise: 2-3 degree quadriceps lag noted during eccentric descending  Treatment     Sy received the treatments listed below:      therapeutic exercises to develop strength, endurance, ROM, flexibility, posture, and core stabilization for 26 minutes including:  Recumbent bike 4' (front and backwards for ROM)- " "Not today  Objective measures taken (15 minutes)  Knee Extension with Heel prop, 3 minutes with 3# above and below knee  Heel Slides with Dorsiflexion 2x10 x5" hold  Shuttle Double Leg Press 100# (50# loaded from the top and bottom) 2 minutes  Shuttle Single (L) Leg Press, 62.5#, 2 minutes  Sled-Pushing, 90#, down and back, 50 feet, 2 laps     manual therapy techniques: Joint mobilizations were applied for 18 minutes, including:  Patellar Mobilizations, all planes  Surgical Incision Mobilizations  Talocrural Anterior to Posterior Mobilizations, Grade III-IV  Talocrural Distraction Mobilizations  Knee Overpressure into Extension, 10x10" hold     neuromuscular re-education activities to improve: Kinesthetic, Proprioception, and Sense for 16 minutes. The following activities were included:  Quad Sets 10x3" hold  Short-Arc Quad, 2# cuff weight, 2x12, supine  Straight-Leg Raises, minimal active-assistance from physical therapist, 2x10  SLS on floor, 10" x 10"  Long Arc Quad, 2# 2x10, 3" hold (not today)  TKE with blue TB  2x10, 3" hold (not today)    therapeutic activities to improve functional performance for 00 minutes, including:          cold pack for 10 minutes to left knee.    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 Exercises were reviewed and Sy was able to demonstrate them prior to the end of the session.  Sy demonstrated good  understanding of the education provided. See EMR under Patient Instructions for exercises provided during therapy sessions.     ASSESSMENT   Patient has undergone 6 visits in the care of his left knee status-post total arthroscopy on 1/9/2023. As of today, he is 3 weeks status-post operation. At this time, his knee flexion is at 105 degrees with knee " extension lacking terminal range by~-3 degrees, which we have endorsed needs to continue to be prioritize to achieve full terminal extension. He demonstrates a good isometric quadriceps contraction, albeit demonstrates quadriceps lag during eccentric descending from a straight-leg raise, maintaining knee extension without a lag during concentric motion. Functional strength and balance continues to be progressed today with good tolerance. Scar mobility is limited at distal-most aspect of surgical incision. He remains a good candidate for skilled therapy to continue to address quadriceps weakness, terminal knee extension range of motion, and functional strengthening and balance.    Sy Is progressing well towards his goals.   Pt prognosis is Excellent.     Pt will continue to benefit from skilled outpatient physical therapy to address the deficits listed in the problem list box on initial evaluation, provide pt/family education and to maximize pt's level of independence in the home and community environment.     Pt's spiritual, cultural and educational needs considered and pt agreeable to plan of care and goals.     Anticipated barriers to physical therapy: None    Goals:  Short Term Goals: 2 weeks   1.) Patient will demonstrate independence in compliance and technique of home exercise program provided as per teach-back method of assessment. Met and Ongoing  2.) Patient will achieve 0 degrees of active and passive knee extension so as to improve normal terminal knee extension during ambulation. Ongoing  3.) Patient will achieve 90 degrees of knee flexion to demonstrate improvement in functional knee range of motion. Met  4.) Patient will complete 10 straight-leg raises without knee extension lag to demonstrate improved quadriceps function and endurance. Ongoing  5.) Patient will demonstrate a 10% improvement as per the FOTO. Ongoing     Long Term Goals: 6 weeks   1.) Patient will demonstrate independence in  compliance and technique of home exercise program provided as per teach-back method of assessment. Ongoing  2.) Patient will achieve and maintain 0 degrees of active and passive knee extension so as to improve normal terminal knee extension during ambulation. Ongoing  3.) Patient will achieve 120 degrees of knee flexion to demonstrate improvement in functional knee range of motion. Ongoing  4.) Patient will ambulate for 300 feet with LRAD and with normal gait mechanics to demonstrate improved functional mobility. Ongoing  5.) Patient will demonstrate a 30% improvement as per the FOTO. Ongoing    PLAN   Continue within POC and progress as pt tolerates.   MD f/u again on 4/11/23.    Becky Ayon PT, DPT  Board Certified in Orthopedic Physical Therapy

## 2023-01-31 ENCOUNTER — CLINICAL SUPPORT (OUTPATIENT)
Dept: REHABILITATION | Facility: HOSPITAL | Age: 68
End: 2023-01-31
Payer: MEDICARE

## 2023-01-31 DIAGNOSIS — M17.12 PRIMARY OSTEOARTHRITIS OF LEFT KNEE: Primary | ICD-10-CM

## 2023-01-31 DIAGNOSIS — M62.81 QUADRICEPS WEAKNESS: ICD-10-CM

## 2023-01-31 PROCEDURE — 97112 NEUROMUSCULAR REEDUCATION: CPT | Mod: PO,CQ

## 2023-01-31 PROCEDURE — 97110 THERAPEUTIC EXERCISES: CPT | Mod: PO,CQ

## 2023-01-31 PROCEDURE — 97140 MANUAL THERAPY 1/> REGIONS: CPT | Mod: PO,CQ

## 2023-01-31 PROCEDURE — 97010 HOT OR COLD PACKS THERAPY: CPT | Mod: PO,CQ

## 2023-02-06 ENCOUNTER — CLINICAL SUPPORT (OUTPATIENT)
Dept: REHABILITATION | Facility: HOSPITAL | Age: 68
End: 2023-02-06
Payer: MEDICARE

## 2023-02-06 DIAGNOSIS — M17.12 PRIMARY OSTEOARTHRITIS OF LEFT KNEE: Primary | ICD-10-CM

## 2023-02-06 DIAGNOSIS — M62.81 QUADRICEPS WEAKNESS: ICD-10-CM

## 2023-02-06 PROCEDURE — 97140 MANUAL THERAPY 1/> REGIONS: CPT | Mod: PO

## 2023-02-06 PROCEDURE — 97112 NEUROMUSCULAR REEDUCATION: CPT | Mod: PO

## 2023-02-06 PROCEDURE — 97110 THERAPEUTIC EXERCISES: CPT | Mod: PO

## 2023-02-06 NOTE — PROGRESS NOTES
"[facility]   Physical Therapy Treatment Note and Progress Note    Name: Sy Aguiar  Clinic Number: [id number]    Therapy Diagnosis:   Encounter Diagnoses   Name Primary?    Primary osteoarthritis of left knee Yes    Quadriceps weakness      Physician: Urbano Vigil MD  Visit Date: 2/6/2023  Physician Orders: PT Eval and Treat   Medical Diagnosis from Referral:   M25.562,G89.29 (ICD-10-CM) - Chronic pain of left knee   M17.12 (ICD-10-CM) - Primary osteoarthritis of left knee   M62.81 (ICD-10-CM) - Quadriceps weakness   I10 (ICD-10-CM) - Hypertension, unspecified type   Evaluation Date: 1/10/2023  Authorization Period Expiration: None  Plan of Care Expiration: 3/1/2023  Most Recent Progress Note: 1/30/2023  Visit # / Visits authorized: 8/11  FOTO: 1/3  Precautions: Standard, HTN **History of Left Quad Tendon Repair**  PTA Visit #: 1/5  Time In: 3:00 pm  Time Out:  4:10 pm  Total Billable Time: 70 minutes (including 10' modalities)     SUBJECTIVE     Patient reports he feels his knee is bending more each day. States he was out of town over the weekend and was not able to complete his home exercise program.    He was somewhat compliant with his home exercise program.  Response to previous treatment: mild discomfort, soreness  Functional change: progressing  Pain: 0/10  Location: Left knee      OBJECTIVE     Objective Measures updated at progress report unless specified.     Treatment     Sy received the treatments listed below:      therapeutic exercises to develop strength, endurance, ROM, flexibility, posture, and core stabilization for 25 minutes including:  Recumbent Bike 10' (front and backwards for ROM)  Knee Extension with Heel prop, 3 minutes with 4# above and below knee  Heel Slides with Dorsiflexion 10x10" hold  Repeated Knee Flexion at Stair 30x2" hold  Gastroc Stair Stretch 3x30"  Sled-Pushing, 90#, forwards + backwards, 50 feet, 2 laps    Shuttle Double Leg Press 100# " "(50# loaded from the top and bottom) 2 minutes (not performed)  Shuttle Single (L) Leg Press, 62.5#, 2 minutes (not performed)     manual therapy techniques: Joint mobilizations were applied for 15 minutes, including:  Patellar Mobilizations, all planes  Talocrural Anterior to Posterior Mobilizations, Grade III-IV  Talocrural Distraction Mobilizations  Knee Overpressure into Extension, 10x10" hold     neuromuscular re-education activities to improve: Kinesthetic, Proprioception, and Sense for 20 minutes. The following activities were included:  Quad Sets 10x3" hold (not performed)  Short-Arc Quad + Straight Leg Raise, 2x15 with 3# ankle weight  SLS on floor, 10" x 10"  Long Arc Quad, 2x15, 3#  TKE with blue TB  1x30, 3" hold     therapeutic activities to improve functional performance for 00 minutes, including:    cold pack for 10 minutes to left knee.    Patient Education and Home Exercises     Home Exercises Provided and Patient Education Provided     Education provided:   - Importance of HEP compliance    Written Home Exercises Provided: Patient instructed to cont prior HEP. Exercises were reviewed and Sy was able to demonstrate them prior to the end of the session.  Sy demonstrated good  understanding of the education provided. See EMR under Patient Instructions for exercises provided during therapy sessions.     ASSESSMENT   Patient ambulating with moderate gait antalgia from waiting room to gym area, and was educated on use of single point cane in right upper extremity to temporarily offload left lower extremity until he is able to ambulate with improved form in order to reduce fall risk. Patient with good tolerance of session reporting discomfort with end-range knee extension mobilizations. Added standing gastroc stretch and closed kinetic chain repeated knee flexion on step to exercise flowsheet, both of which he tolerated well. Instructed patient to continue with with current HEP which he verbalized " understanding of.    Sy is progressing well towards his goals    Pt prognosis is Excellent.     Pt will continue to benefit from skilled outpatient physical therapy to address the deficits listed in the problem list box on initial evaluation, provide pt/family education and to maximize pt's level of independence in the home and community environment.     Pt's spiritual, cultural and educational needs considered and pt agreeable to plan of care and goals.     Anticipated barriers to physical therapy: None    Goals:  Short Term Goals: 2 weeks   1.) Patient will demonstrate independence in compliance and technique of home exercise program provided as per teach-back method of assessment. Met and Ongoing  2.) Patient will achieve 0 degrees of active and passive knee extension so as to improve normal terminal knee extension during ambulation. Ongoing  3.) Patient will achieve 90 degrees of knee flexion to demonstrate improvement in functional knee range of motion. Met  4.) Patient will complete 10 straight-leg raises without knee extension lag to demonstrate improved quadriceps function and endurance. Ongoing  5.) Patient will demonstrate a 10% improvement as per the FOTO. Ongoing     Long Term Goals: 6 weeks   1.) Patient will demonstrate independence in compliance and technique of home exercise program provided as per teach-back method of assessment. Ongoing  2.) Patient will achieve and maintain 0 degrees of active and passive knee extension so as to improve normal terminal knee extension during ambulation. Ongoing  3.) Patient will achieve 120 degrees of knee flexion to demonstrate improvement in functional knee range of motion. Ongoing  4.) Patient will ambulate for 300 feet with LRAD and with normal gait mechanics to demonstrate improved functional mobility. Ongoing  5.) Patient will demonstrate a 30% improvement as per the FOTO. Ongoing    PLAN     Continue progressing per patient tolerance and POC.    Kaden MARTIN  Sergio, PT, DPT

## 2023-02-13 ENCOUNTER — CLINICAL SUPPORT (OUTPATIENT)
Dept: REHABILITATION | Facility: HOSPITAL | Age: 68
End: 2023-02-13
Payer: MEDICARE

## 2023-02-13 DIAGNOSIS — M17.12 PRIMARY OSTEOARTHRITIS OF LEFT KNEE: Primary | ICD-10-CM

## 2023-02-13 DIAGNOSIS — M62.81 QUADRICEPS WEAKNESS: ICD-10-CM

## 2023-02-13 PROCEDURE — 97110 THERAPEUTIC EXERCISES: CPT | Mod: PO,CQ

## 2023-02-13 PROCEDURE — 97112 NEUROMUSCULAR REEDUCATION: CPT | Mod: PO,CQ

## 2023-02-13 NOTE — PROGRESS NOTES
"[facility]   Physical Therapy Treatment Note and Progress Note    Name: Sy Aguiar  Clinic Number: [id number]    Therapy Diagnosis:   Encounter Diagnoses   Name Primary?    Primary osteoarthritis of left knee Yes    Quadriceps weakness      Physician: Urbano Vigil MD  Visit Date: 2/13/2023  Physician Orders: PT Eval and Treat   Medical Diagnosis from Referral:   M25.562,G89.29 (ICD-10-CM) - Chronic pain of left knee   M17.12 (ICD-10-CM) - Primary osteoarthritis of left knee   M62.81 (ICD-10-CM) - Quadriceps weakness   I10 (ICD-10-CM) - Hypertension, unspecified type   Evaluation Date: 1/10/2023  Authorization Period Expiration: None  Plan of Care Expiration: 3/1/2023  Most Recent Progress Note: 1/30/2023  Visit # / Visits authorized: 10/11  FOTO: 1/3  Precautions: Standard, HTN **History of Left Quad Tendon Repair**  PTA Visit #: 2/5    Time In: 1:55 PM  Time Out: 2:45 PM    Total Billable Time: 50 minutes    SUBJECTIVE     Patient reports: he overdid it yesterday and had some increased pain. He is using his cane today and trying to be more cautious.  He was not as compliant with his home exercise program.  Response to previous treatment: improved   Functional change: progressing  Pain: 0/10  Location: Left knee      OBJECTIVE     Objective Measures updated at progress report unless specified.     Treatment     Sy received the treatments listed below:      therapeutic exercises to develop strength, endurance, ROM, flexibility, posture, and core stabilization for 25 minutes including:  Recumbent Bike 6' - for ROM  Knee Extension with Heel prop, 3 minutes with 7# at knee  Repeated Knee Flexion at Stair 30x2" hold  Gastroc Stair Stretch 3x30"  Sled-Pushing, 90#, forwards + backwards, 50 feet, 2 laps  Shuttle Double Leg Press (3 cords top, 2 cords bottom) 2 minutes     manual therapy techniques: Joint mobilizations were applied for 10 minutes, including:  Patellar Mobilizations, all " "planes  Talocrural Anterior to Posterior Mobilizations, Grade III-IV  Talocrural Distraction Mobilizations  Knee Overpressure into Extension, 10x10" hold    neuromuscular re-education activities to improve: Kinesthetic, Proprioception, and Sense for 15 minutes. The following activities were included:  Short-Arc Quad + Straight Leg Raise, 2x15 with 3# ankle weight  SLS on floor, 5 x 15" hold  Long Arc Quad, 2x15, 3#  TKE with blue TB  1x30, 3" hold     Not performed:  Quad Sets 10x3" hold  Shuttle Single (L) Leg Press, 62.5#, 2 minutes  Heel Slides with Dorsiflexion 10x10" hold    therapeutic activities to improve functional performance for - minutes, including:    cold pack for -  minutes to left knee.    Patient Education and Home Exercises     Home Exercises Provided and Patient Education Provided     Education provided:   - Importance of HEP compliance  - Using is SPC    Written Home Exercises Provided: Patient instructed to cont prior HEP. Exercises were reviewed and Sy was able to demonstrate them prior to the end of the session.  Sy demonstrated good  understanding of the education provided. See EMR under Patient Instructions for exercises provided during therapy sessions.     ASSESSMENT   Patient with improved ambulation to therapy today using his SPC and instructed to continue with this outside of the home to offload the LLE until he gains further strength and stability. He remains appropriately challenged by his program, but is doing better achieving knee extension.     Sy is progressing well towards his goals  Pt prognosis is Excellent.     Pt will continue to benefit from skilled outpatient physical therapy to address the deficits listed in the problem list box on initial evaluation, provide pt/family education and to maximize pt's level of independence in the home and community environment.     Pt's spiritual, cultural and educational needs considered and pt agreeable to plan of care and " goals.     Anticipated barriers to physical therapy: None    Goals:  Short Term Goals: 2 weeks   1.) Patient will demonstrate independence in compliance and technique of home exercise program provided as per teach-back method of assessment. Met and Ongoing  2.) Patient will achieve 0 degrees of active and passive knee extension so as to improve normal terminal knee extension during ambulation. Ongoing  3.) Patient will achieve 90 degrees of knee flexion to demonstrate improvement in functional knee range of motion. Met  4.) Patient will complete 10 straight-leg raises without knee extension lag to demonstrate improved quadriceps function and endurance. Ongoing  5.) Patient will demonstrate a 10% improvement as per the FOTO. Ongoing     Long Term Goals: 6 weeks   1.) Patient will demonstrate independence in compliance and technique of home exercise program provided as per teach-back method of assessment. Ongoing  2.) Patient will achieve and maintain 0 degrees of active and passive knee extension so as to improve normal terminal knee extension during ambulation. Ongoing  3.) Patient will achieve 120 degrees of knee flexion to demonstrate improvement in functional knee range of motion. Ongoing  4.) Patient will ambulate for 300 feet with LRAD and with normal gait mechanics to demonstrate improved functional mobility. Ongoing  5.) Patient will demonstrate a 30% improvement as per the FOTO. Ongoing    PLAN   Plan of Care Expiration: 3/1/2023    Continue progressing per patient tolerance and POC.    Nati Pepe, PTA

## 2023-02-14 ENCOUNTER — OFFICE VISIT (OUTPATIENT)
Dept: URGENT CARE | Facility: CLINIC | Age: 68
End: 2023-02-14
Payer: MEDICARE

## 2023-02-14 VITALS
DIASTOLIC BLOOD PRESSURE: 81 MMHG | TEMPERATURE: 98 F | SYSTOLIC BLOOD PRESSURE: 117 MMHG | WEIGHT: 275 LBS | HEART RATE: 59 BPM | BODY MASS INDEX: 37.25 KG/M2 | HEIGHT: 72 IN | RESPIRATION RATE: 16 BRPM | OXYGEN SATURATION: 98 %

## 2023-02-14 DIAGNOSIS — R05.9 COUGH, UNSPECIFIED TYPE: ICD-10-CM

## 2023-02-14 DIAGNOSIS — B34.9 ACUTE VIRAL SYNDROME: Primary | ICD-10-CM

## 2023-02-14 LAB
CTP QC/QA: YES
CTP QC/QA: YES
POC MOLECULAR INFLUENZA A AGN: NEGATIVE
POC MOLECULAR INFLUENZA B AGN: NEGATIVE
SARS-COV-2 AG RESP QL IA.RAPID: NEGATIVE

## 2023-02-14 PROCEDURE — 87502 POCT INFLUENZA A/B MOLECULAR: ICD-10-PCS | Mod: QW,S$GLB,,

## 2023-02-14 PROCEDURE — 3079F PR MOST RECENT DIASTOLIC BLOOD PRESSURE 80-89 MM HG: ICD-10-PCS | Mod: CPTII,S$GLB,,

## 2023-02-14 PROCEDURE — 87811 SARS-COV-2 COVID19 W/OPTIC: CPT | Mod: QW,S$GLB,,

## 2023-02-14 PROCEDURE — 3008F BODY MASS INDEX DOCD: CPT | Mod: CPTII,S$GLB,,

## 2023-02-14 PROCEDURE — 3079F DIAST BP 80-89 MM HG: CPT | Mod: CPTII,S$GLB,,

## 2023-02-14 PROCEDURE — 87502 INFLUENZA DNA AMP PROBE: CPT | Mod: QW,S$GLB,,

## 2023-02-14 PROCEDURE — 1159F PR MEDICATION LIST DOCUMENTED IN MEDICAL RECORD: ICD-10-PCS | Mod: CPTII,S$GLB,,

## 2023-02-14 PROCEDURE — 99213 OFFICE O/P EST LOW 20 MIN: CPT | Mod: S$GLB,,,

## 2023-02-14 PROCEDURE — 1160F RVW MEDS BY RX/DR IN RCRD: CPT | Mod: CPTII,S$GLB,,

## 2023-02-14 PROCEDURE — 3074F SYST BP LT 130 MM HG: CPT | Mod: CPTII,S$GLB,,

## 2023-02-14 PROCEDURE — 3008F PR BODY MASS INDEX (BMI) DOCUMENTED: ICD-10-PCS | Mod: CPTII,S$GLB,,

## 2023-02-14 PROCEDURE — 99213 PR OFFICE/OUTPT VISIT, EST, LEVL III, 20-29 MIN: ICD-10-PCS | Mod: S$GLB,,,

## 2023-02-14 PROCEDURE — 1160F PR REVIEW ALL MEDS BY PRESCRIBER/CLIN PHARMACIST DOCUMENTED: ICD-10-PCS | Mod: CPTII,S$GLB,,

## 2023-02-14 PROCEDURE — 1159F MED LIST DOCD IN RCRD: CPT | Mod: CPTII,S$GLB,,

## 2023-02-14 PROCEDURE — 87811 SARS CORONAVIRUS 2 ANTIGEN POCT, MANUAL READ: ICD-10-PCS | Mod: QW,S$GLB,,

## 2023-02-14 PROCEDURE — 3074F PR MOST RECENT SYSTOLIC BLOOD PRESSURE < 130 MM HG: ICD-10-PCS | Mod: CPTII,S$GLB,,

## 2023-02-14 RX ORDER — BENZONATATE 100 MG/1
100 CAPSULE ORAL 3 TIMES DAILY PRN
Qty: 30 CAPSULE | Refills: 0 | Status: SHIPPED | OUTPATIENT
Start: 2023-02-14 | End: 2023-02-24

## 2023-02-14 RX ORDER — FLUTICASONE PROPIONATE 50 MCG
1 SPRAY, SUSPENSION (ML) NASAL DAILY
Qty: 16 G | Refills: 0 | Status: SHIPPED | OUTPATIENT
Start: 2023-02-14 | End: 2023-03-16

## 2023-02-14 RX ORDER — CETIRIZINE HYDROCHLORIDE 10 MG/1
5 TABLET ORAL DAILY
Qty: 30 TABLET | Refills: 0 | Status: SHIPPED | OUTPATIENT
Start: 2023-02-14 | End: 2023-02-14 | Stop reason: CLARIF

## 2023-02-14 NOTE — PROGRESS NOTES
Subjective:       Patient ID: Sy Aguiar is a 67 y.o. male.    Vitals:  height is 6' (1.829 m) and weight is 124.7 kg (275 lb). His temperature is 98.1 °F (36.7 °C). His blood pressure is 117/81 and his pulse is 59 (abnormal). His respiration is 16 and oxygen saturation is 98%.     Chief Complaint: Cough    Past Medical History:  No date: Asthma  No date: Hypertension    Provider's note begins below:  Pt reports feeling lightheaded after a flight home on Friday 2/10/2023. Lightheadedness is intermittent and worse when he goes up and down an elevator in his apartment. No symptoms currently. He reports he was in cold weather while he was away. He started with a cough and sore throat last night. He reports having lightheadedness like this when he had covid in 4/2022. He reports starting zoloft and trazodone last month. No earaches/fullness, nasal/sinus congestion, post-nasal drip, CP, SOB, abd pain, N/V/D, vision changes.     Cough  This is a new problem. The current episode started yesterday. The problem has been waxing and waning. The problem occurs hourly. The cough is Non-productive. Associated symptoms include a sore throat. Pertinent negatives include no chest pain, chills, ear pain, fever, headaches, myalgias, nasal congestion, postnasal drip or shortness of breath. Associated symptoms comments: Fatigue/ dizziness. Nothing aggravates the symptoms. He has tried nothing for the symptoms. There is no history of asthma, bronchitis, COPD or pneumonia.     Constitution: Negative for chills, fatigue and fever.   HENT:  Positive for sore throat. Negative for ear pain, congestion, postnasal drip, sinus pain and sinus pressure.    Cardiovascular:  Negative for chest pain.   Eyes:  Negative for eye discharge, eye pain, vision loss, double vision, blurred vision and eyelid swelling.   Respiratory:  Positive for cough. Negative for shortness of breath.    Gastrointestinal:  Negative for abdominal pain, nausea,  vomiting and diarrhea.   Musculoskeletal:  Negative for muscle ache.   Neurological:  Negative for headaches.     Objective:      Physical Exam   Constitutional: He is oriented to person, place, and time. He appears well-developed. He is cooperative.  Non-toxic appearance. He does not appear ill. No distress.   HENT:   Head: Normocephalic and atraumatic.   Ears:   Right Ear: Hearing, tympanic membrane, external ear and ear canal normal.   Left Ear: Hearing, tympanic membrane, external ear and ear canal normal.   Nose: Nose normal. No mucosal edema, rhinorrhea, nasal deformity or congestion. No epistaxis. Right sinus exhibits no maxillary sinus tenderness and no frontal sinus tenderness. Left sinus exhibits no maxillary sinus tenderness and no frontal sinus tenderness.   Mouth/Throat: Uvula is midline and mucous membranes are normal. No trismus in the jaw. Normal dentition. No uvula swelling. Posterior oropharyngeal erythema present. No oropharyngeal exudate or posterior oropharyngeal edema. No tonsillar exudate.   Eyes: Conjunctivae and lids are normal. Pupils are equal, round, and reactive to light. No scleral icterus.   Neck: Trachea normal and phonation normal. Neck supple. No edema present. No erythema present. No neck rigidity present.   Cardiovascular: Normal rate, regular rhythm, normal heart sounds and normal pulses.   Pulmonary/Chest: Effort normal and breath sounds normal. No stridor. No respiratory distress. He has no decreased breath sounds. He has no wheezes. He has no rhonchi. He has no rales.   Abdominal: Normal appearance.   Musculoskeletal: Normal range of motion.         General: No deformity. Normal range of motion.   Lymphadenopathy:     He has no cervical adenopathy.   Neurological: He is alert and oriented to person, place, and time. He exhibits normal muscle tone. Coordination normal.   Skin: Skin is warm, dry, intact, not diaphoretic and not pale.   Psychiatric: His speech is normal and  behavior is normal. Judgment and thought content normal.   Nursing note and vitals reviewed.        Results for orders placed or performed in visit on 02/14/23   SARS Coronavirus 2 Antigen, POCT Manual Read   Result Value Ref Range    SARS Coronavirus 2 Antigen Negative Negative     Acceptable Yes    POCT Influenza A/B MOLECULAR   Result Value Ref Range    POC Molecular Influenza A Ag Negative Negative, Not Reported    POC Molecular Influenza B Ag Negative Negative, Not Reported     Acceptable Yes        Assessment:       1. Acute viral syndrome    2. Cough, unspecified type          Plan:         Acute viral syndrome    Cough, unspecified type  -     SARS Coronavirus 2 Antigen, POCT Manual Read  -     POCT Influenza A/B MOLECULAR  -     fluticasone propionate (FLONASE) 50 mcg/actuation nasal spray; 1 spray (50 mcg total) by Each Nostril route once daily.  Dispense: 16 g; Refill: 0  -     Discontinue: cetirizine (ZYRTEC) 10 MG tablet; Take 0.5 tablets (5 mg total) by mouth once daily.  Dispense: 30 tablet; Refill: 0  -     benzonatate (TESSALON) 100 MG capsule; Take 1 capsule (100 mg total) by mouth 3 (three) times daily as needed for Cough.  Dispense: 30 capsule; Refill: 0      I advised him to follow up with PCP for continued lightheadedness with recent start of zoloft and trazodone together. Strict ED precautions given.        Discussed results/diagnosis/plan with patient in clinic. Strict precautions given to patient to monitor for worsening signs and symptoms. Advised to follow up with PCP or specialist.  Explained side effects of medications prescribed with patient and informed him/her to discontinue use if he/she has any side effects and to inform UC or PCP if this occurs. All questions answered. Strict ED verses clinic return precautions stressed and given in depth. Advised if symptoms worsens of fail to improve he/she should go to the Emergency Room. Discharge and follow-up  instructions given verbally/printed with the patient who expressed understanding and willingness to comply with my recommendations. Patient voiced understanding and in agreement with current treatment plan. Patient exits the exam room in no acute distress. Conversant and engaged during discharge discussion, verbalized understanding.

## 2023-02-14 NOTE — PATIENT INSTRUCTIONS
You are negative for flu and COVID today.     When sick with a viral illness, cover mouth and nose when sneezing and coughing. Wash hands frequently. Sanitize areas at home. Wear a mask in public if you need. Stay home if you are having fevers, chills, body aches, fatigue. Symptoms should resolve in 7-14 days. There is no specific treatment for viral illnesses. We treat symptoms with supportive care. Getting plenty of rest but completing light activities daily, eating a well-balanced diet, drinking plenty of fluids, managing stress levels can aid in faster recovery.     Try a decongestant and corticosteroid nasal spray like flonase for the next few days for sinus relief. Initial: 2 sprays in each nostril once daily for 1 week. Reduce to 1 spray in each nostril once per day. An antihistamine like zyrtec, claritin, allegra can also be helpful for sinus relief. Neti pot irrigation, humidifier in your room, saline nasal spray and warm compresses to face can help. (Guaifenesin) Mucinex 1200 mg twice per day for 10 days can help thin secretions for better clearance. Drink plenty of fluids with this. Honey is a natural cough suppressant.     Please only use over the counter cough and cold medications for 3-5 days at a time to avoid rebound symptoms.     Getting plenty of rest can aid in a faster recovery of illnesses.     Avoid OTC cough and cold medications that can raise your BP such as dextromethorphan, pseudoephedrine, phenylephrine, naphazoline and oxymetazoline. Try DASH diet and buy a blood pressure cuff for home monitoring. Check blood pressure at least 2 times per day and create a log. Avoid eating foods that are high in salt.     Alternate Tylenol and ibuprofen every 4 hours as needed for fever and body aches.  Please take NSAIDs with a full glass of water and food to avoid GI upset.  Get plenty of rest.  Drink at least 3 L of water per day to clear/thin secretions.      Please follow-up with your primary care  provider or return to the clinic if not better/worsening symptoms after 1 week.     Report to the ER if you have chest pain, shortness of breath, palpitations.

## 2023-02-15 ENCOUNTER — CLINICAL SUPPORT (OUTPATIENT)
Dept: REHABILITATION | Facility: HOSPITAL | Age: 68
End: 2023-02-15
Attending: ORTHOPAEDIC SURGERY
Payer: MEDICARE

## 2023-02-15 DIAGNOSIS — M17.12 PRIMARY OSTEOARTHRITIS OF LEFT KNEE: Primary | ICD-10-CM

## 2023-02-15 DIAGNOSIS — M62.81 QUADRICEPS WEAKNESS: ICD-10-CM

## 2023-02-15 PROCEDURE — 97110 THERAPEUTIC EXERCISES: CPT | Mod: PO

## 2023-02-15 PROCEDURE — 97140 MANUAL THERAPY 1/> REGIONS: CPT | Mod: PO

## 2023-02-15 NOTE — PROGRESS NOTES
[facility]   Physical Therapy Treatment Note     Name: Sy Aguiar  [facility] Number: [id number]    Therapy Diagnosis:   Encounter Diagnoses   Name Primary?    Primary osteoarthritis of left knee Yes    Quadriceps weakness      Physician: Urbano Vigil MD  Visit Date: 2/15/2023  Physician Orders: PT Eval and Treat   Medical Diagnosis from Referral:   M25.562,G89.29 (ICD-10-CM) - Chronic pain of left knee   M17.12 (ICD-10-CM) - Primary osteoarthritis of left knee   M62.81 (ICD-10-CM) - Quadriceps weakness   I10 (ICD-10-CM) - Hypertension, unspecified type   Evaluation Date: 1/10/2023  Authorization Period Expiration: None  Plan of Care Expiration: 3/1/2023  Most Recent Progress Note: 1/30/2023  Visit # / Visits authorized: 8/15  FOTO: 1/3  Precautions: Standard, HTN **History of Left Quad Tendon Repair**  PTA Visit #: 2/5  Time In: 1:53 pm  Time Out: 2:53  Total Billable Time: 30 minutes  **PT one-on-one with patient for 30 minutes with PT extender utilized for remainder of PT session**    **Patient is 5 weeks and 2 days post-surgical as of 2/15/2023.  SUBJECTIVE   Patient reports: he just returned from another tour, and bought a cane while at his destination. He reports that he was instructed to use it in previous sessions as he still continues to limp and have pain.  He was not as compliant with his home exercise program.  Response to previous treatment: improved   Functional change: progressing  Pain: 0/10  Location: Left knee    OBJECTIVE      Left   Knee Flexion 108 degrees   Knee Extension 0 degrees     Quadriceps Contraction: Good  Straight-Leg Raise: Slight ~2-3 degree lag noted during straight-leg raise that becomes more evident with set-in of fatigue.  Treatment     Sy received the treatments listed below:      therapeutic exercises to develop strength, endurance, ROM, flexibility, posture, and core stabilization for 28 minutes including:  Upright Bike, Seat 13, Level 7  "(started at Level 2)  Knee Extension with Heel Prop, 5 minutes with 4# above and below knee  Straight-Leg Raise, HOB elevated, 3x10 (cueing for proper technique)  Shuttle Single Leg Press (37.5# on first set, 50# on second set, loaded from the bottom) 2 minutes     manual therapy techniques: Joint mobilizations were applied for 12 minutes, including:  Patellar Mobilizations, all planes  Tibiofemoral Joint Posterior Glides with 1/2 bolster  Knee Overpressure into Extension, 10x10" hold    neuromuscular re-education activities to improve: Kinesthetic, Proprioception, and Sense for 00 minutes. The following activities were included:    therapeutic activities to improve functional performance for 20 minutes, including:  Forward Step-Up, 4" step with TKE, Green Band, 12x  Forward Step-Up, 6" step with TKE, Green Band, 12x  Step-Over Hurdles, 1x6" rafa, 2x12, bilaterally for range of motion and Single Leg Stance  Sit to Stand, symmetrical foot stance, 3x8, 10# KB, Airex pad added during final set due to fatigue and loss of correct form    Patient Education and Home Exercises     Home Exercises Provided and Patient Education Provided     Education provided:   - Importance of HEP compliance  - Using is SPC    Written Home Exercises Provided: Patient instructed to cont prior HEP. Exercises were reviewed and Sy was able to demonstrate them prior to the end of the session.  Sy demonstrated good  understanding of the education provided. See EMR under Patient Instructions for exercises provided during therapy sessions.     ASSESSMENT   Sy demonstrates a range of 0-108 degrees today, with overpressure allowing passive flexion at 110 degrees. He demonstrates no evidence of DVT in lower extremity during assessment after his travels, albeit exhibits only a 3 degree increase in flexion range of motion since his last progress note 2 weeks ago. He exhibits quadriceps lag, especially with fatigue and is noted to ambulate with " "increased Trendelenberg and decreased stability on his operative leg. He also notes challenge with step-ups on 6" step without contralateral hand-hold.    Sy is progressing well towards his goals  Pt prognosis is Excellent.     Pt will continue to benefit from skilled outpatient physical therapy to address the deficits listed in the problem list box on initial evaluation, provide pt/family education and to maximize pt's level of independence in the home and community environment.     Pt's spiritual, cultural and educational needs considered and pt agreeable to plan of care and goals.     Anticipated barriers to physical therapy: None    Goals:  Short Term Goals: 2 weeks   1.) Patient will demonstrate independence in compliance and technique of home exercise program provided as per teach-back method of assessment. Met and Ongoing  2.) Patient will achieve 0 degrees of active and passive knee extension so as to improve normal terminal knee extension during ambulation. Ongoing  3.) Patient will achieve 90 degrees of knee flexion to demonstrate improvement in functional knee range of motion. Met  4.) Patient will complete 10 straight-leg raises without knee extension lag to demonstrate improved quadriceps function and endurance. Ongoing  5.) Patient will demonstrate a 10% improvement as per the FOTO. Ongoing     Long Term Goals: 6 weeks   1.) Patient will demonstrate independence in compliance and technique of home exercise program provided as per teach-back method of assessment. Ongoing  2.) Patient will achieve and maintain 0 degrees of active and passive knee extension so as to improve normal terminal knee extension during ambulation. Ongoing  3.) Patient will achieve 120 degrees of knee flexion to demonstrate improvement in functional knee range of motion. Ongoing  4.) Patient will ambulate for 300 feet with LRAD and with normal gait mechanics to demonstrate improved functional mobility. Ongoing  5.) Patient will " demonstrate a 30% improvement as per the FOTO. Ongoing    PLAN   Plan of Care Expiration: 3/1/2023    Continue progressing per patient tolerance and plan of care, with emphasis on functional strengthening, knee flexion range of motion and single leg balance.    Becky Ayon PT, DPT  Board Certified in Orthopedic Physical Therapy

## 2023-02-20 ENCOUNTER — CLINICAL SUPPORT (OUTPATIENT)
Dept: REHABILITATION | Facility: HOSPITAL | Age: 68
End: 2023-02-20
Attending: ORTHOPAEDIC SURGERY
Payer: MEDICARE

## 2023-02-20 DIAGNOSIS — M62.81 QUADRICEPS WEAKNESS: ICD-10-CM

## 2023-02-20 DIAGNOSIS — M17.12 PRIMARY OSTEOARTHRITIS OF LEFT KNEE: Primary | ICD-10-CM

## 2023-02-20 PROCEDURE — 97530 THERAPEUTIC ACTIVITIES: CPT | Mod: PO,CQ

## 2023-02-20 PROCEDURE — 97110 THERAPEUTIC EXERCISES: CPT | Mod: PO,CQ

## 2023-02-20 NOTE — PROGRESS NOTES
"[facility]   Physical Therapy Treatment Note     Name: Sy Aguiar  Clinic Number: [id number]    Therapy Diagnosis:   Encounter Diagnoses   Name Primary?    Primary osteoarthritis of left knee Yes    Quadriceps weakness      Physician: Urbano Vigil MD  Visit Date: 2/20/2023  Physician Orders: PT Eval and Treat   Medical Diagnosis from Referral:   M25.562,G89.29 (ICD-10-CM) - Chronic pain of left knee   M17.12 (ICD-10-CM) - Primary osteoarthritis of left knee   M62.81 (ICD-10-CM) - Quadriceps weakness   I10 (ICD-10-CM) - Hypertension, unspecified type   Evaluation Date: 1/10/2023  Authorization Period Expiration: None  Plan of Care Expiration: 3/1/2023  Most Recent Progress Note: 1/30/2023  Visit # / Visits authorized: 9/15  FOTO: 1/3  Precautions: Standard, HTN **History of Left Quad Tendon Repair**  PTA Visit #: 1/5    Time In: 1100  Time Out: 1145  Total Billable Time: 45 minutes    *Patient is 6 weeks post-surgical as of 2/20/2023.    SUBJECTIVE   Patient reports: his left ankle was bothering him when he got up this morning so he brought his cane today.   He was not very compliant with his home exercise program.  Response to previous treatment: improved   Functional change: progressing  Pain: 0/10  Location: Left knee    OBJECTIVE   Objective Measures updated at progress report unless specified.     Treatment   Sy received the treatments listed below:      therapeutic exercises to develop strength, endurance, ROM, flexibility, posture, and core stabilization for 23 minutes including:  Upright Bike, Seat 13, Level 7 (started at Level 2) - 6 min  Knee Extension with Heel Prop, 5 minutes with 6# above and below knee  Straight-Leg Raise, HOB elevated, 3x10 (cueing for proper technique)  Long sit calf stretch w/ strap 30" hold 3x   Shuttle Single Leg Press (37.5# on first set, 50# on second set, loaded from the bottom) 2 minutes     manual therapy techniques: Joint mobilizations " "were applied for 08 minutes, including:  Patellar Mobilizations, all planes  Tibiofemoral Joint Posterior Glides with 1/2 bolster  Knee Overpressure into Extension, 10x10" hold    neuromuscular re-education activities to improve: Kinesthetic, Proprioception, and Sense for 00 minutes. The following activities were included:    therapeutic activities to improve functional performance for 14 minutes, including:  Forward Step-Up, 6" step with TKE, Green Band, 12x2  Step-Over Hurdles, 1x6" rafa, 2x12, bilaterally for range of motion and Single Leg Stance  Sit to Stand, symmetrical foot stance, 3x8, 10# KB    Patient Education and Home Exercises     Home Exercises Provided and Patient Education Provided     Education provided:   - Importance of HEP compliance  - Using is SPC    Written Home Exercises Provided: Patient instructed to cont prior HEP. Exercises were reviewed and Sy was able to demonstrate them prior to the end of the session.  Sy demonstrated good  understanding of the education provided. See EMR under Patient Instructions for exercises provided during therapy sessions.     ASSESSMENT   Patient tolerates his program well but remains appropriately challenged by strengthening activities due to his ongoing strength and endurance deficits. Weakness continues to be especially evident at the quadriceps while demonstrating a quad lag during his SLR, but is improving with cues to maximize eccentric control.     Sy is progressing well towards his goals  Pt prognosis is Excellent.     Pt will continue to benefit from skilled outpatient physical therapy to address the deficits listed in the problem list box on initial evaluation, provide pt/family education and to maximize pt's level of independence in the home and community environment.     Pt's spiritual, cultural and educational needs considered and pt agreeable to plan of care and goals.     Anticipated barriers to physical therapy: None    Goals:  Short " Term Goals: 2 weeks   1.) Patient will demonstrate independence in compliance and technique of home exercise program provided as per teach-back method of assessment. Met and Ongoing  2.) Patient will achieve 0 degrees of active and passive knee extension so as to improve normal terminal knee extension during ambulation. Ongoing  3.) Patient will achieve 90 degrees of knee flexion to demonstrate improvement in functional knee range of motion. Met  4.) Patient will complete 10 straight-leg raises without knee extension lag to demonstrate improved quadriceps function and endurance. Ongoing  5.) Patient will demonstrate a 10% improvement as per the FOTO. Ongoing     Long Term Goals: 6 weeks   1.) Patient will demonstrate independence in compliance and technique of home exercise program provided as per teach-back method of assessment. Ongoing  2.) Patient will achieve and maintain 0 degrees of active and passive knee extension so as to improve normal terminal knee extension during ambulation. Ongoing  3.) Patient will achieve 120 degrees of knee flexion to demonstrate improvement in functional knee range of motion. Ongoing  4.) Patient will ambulate for 300 feet with LRAD and with normal gait mechanics to demonstrate improved functional mobility. Ongoing  5.) Patient will demonstrate a 30% improvement as per the FOTO. Ongoing    PLAN   Plan of Care Expiration: 3/1/2023    Continue progressing per patient tolerance and plan of care, with emphasis on functional strengthening, knee flexion range of motion and single leg balance.    Nati Pepe, PTA

## 2023-02-28 ENCOUNTER — CLINICAL SUPPORT (OUTPATIENT)
Dept: REHABILITATION | Facility: HOSPITAL | Age: 68
End: 2023-02-28
Payer: MEDICARE

## 2023-02-28 DIAGNOSIS — M62.81 QUADRICEPS WEAKNESS: ICD-10-CM

## 2023-02-28 DIAGNOSIS — M17.12 PRIMARY OSTEOARTHRITIS OF LEFT KNEE: Primary | ICD-10-CM

## 2023-02-28 PROCEDURE — 97530 THERAPEUTIC ACTIVITIES: CPT | Mod: PO,CQ

## 2023-02-28 PROCEDURE — 97110 THERAPEUTIC EXERCISES: CPT | Mod: PO,CQ

## 2023-02-28 NOTE — PROGRESS NOTES
"[facility]   Physical Therapy Treatment Note     Name: Sy Aguiar  Clinic Number: [id number]    Therapy Diagnosis:   Encounter Diagnoses   Name Primary?    Primary osteoarthritis of left knee Yes    Quadriceps weakness      Physician: Urbano Vigil MD  Visit Date: 2/28/2023  Physician Orders: PT Eval and Treat   Medical Diagnosis from Referral:   M25.562,G89.29 (ICD-10-CM) - Chronic pain of left knee   M17.12 (ICD-10-CM) - Primary osteoarthritis of left knee   M62.81 (ICD-10-CM) - Quadriceps weakness   I10 (ICD-10-CM) - Hypertension, unspecified type   Evaluation Date: 1/10/2023  Authorization Period Expiration: None  Plan of Care Expiration: 3/1/2023  Most Recent Progress Note: 1/30/2023  Visit # / Visits authorized: 10/15  FOTO: 1/3  Precautions: Standard, HTN **History of Left Quad Tendon Repair**  PTA Visit #: 2/5    Time In: 1000  Time Out: 1047  Total Billable Time: 47 minutes    SUBJECTIVE   Patient reports: he rode his bike around city [place] for 30-40 minutes. "I didn't go up that big hill though."  He was not very compliant with his home exercise program.  Response to previous treatment: improved   Functional change: progressing  Pain: 0/10  Location: Left knee    OBJECTIVE   Objective Measures updated at progress report unless specified.     Treatment   Sy received the treatments listed below:      therapeutic exercises to develop strength, endurance, ROM, flexibility, posture, and core stabilization for 24 minutes including:  Upright Bike, Seat 13, Level 7 (started at Level 2) - 8 min  Knee Extension with Heel Prop, 5 minutes with 7# above and below knee  Straight-Leg Raise, HOB elevated, 3x10 (cueing for proper technique)  Long sit calf stretch w/ strap 30" hold 3x   Shuttle Double Leg Press (2 cords loaded from the bottom, 3 cords loaded from the top) 2 minutes     manual therapy techniques: Joint mobilizations were applied for 08 minutes, including:  Patellar " "Mobilizations, all planes  Tibiofemoral Joint Posterior Glides with 1/2 bolster  Knee Overpressure into Extension, 10x10" hold    neuromuscular re-education activities to improve: Kinesthetic, Proprioception, and Sense for 00 minutes. The following activities were included:    therapeutic activities to improve functional performance for 15 minutes, including:  Forward Step-Up, 6" step with TKE, Green Band, 12x2  Step-Over Hurdles, 1x6" rafa, 2x12, bilaterally for range of motion and Single Leg Stance  Sit to Stand, symmetrical foot stance, 3x8, 10# KB  Sled (forward) 90# 50' x 2    Patient Education and Home Exercises     Home Exercises Provided and Patient Education Provided     Education provided:   - Importance of HEP compliance  - Using SPC    Written Home Exercises Provided: Patient instructed to cont prior HEP. Exercises were reviewed and Sy was able to demonstrate them prior to the end of the session.  Sy demonstrated good  understanding of the education provided. See EMR under Patient Instructions for exercises provided during therapy sessions.     ASSESSMENT   Patient able to achieve full knee extension with manual treatment but remains with mild extension deficits during his SLR. We continue to work on strengthening exercises while providing cues to maximize his TKE for functional ambulation.     Sy is progressing well towards his goals  Pt prognosis is Excellent.     Pt will continue to benefit from skilled outpatient physical therapy to address the deficits listed in the problem list box on initial evaluation, provide pt/family education and to maximize pt's level of independence in the home and community environment.     Pt's spiritual, cultural and educational needs considered and pt agreeable to plan of care and goals.     Anticipated barriers to physical therapy: None    Goals:  Short Term Goals: 2 weeks   1.) Patient will demonstrate independence in compliance and technique of home " exercise program provided as per teach-back method of assessment. Met and Ongoing  2.) Patient will achieve 0 degrees of active and passive knee extension so as to improve normal terminal knee extension during ambulation. Ongoing  3.) Patient will achieve 90 degrees of knee flexion to demonstrate improvement in functional knee range of motion. Met  4.) Patient will complete 10 straight-leg raises without knee extension lag to demonstrate improved quadriceps function and endurance. Ongoing  5.) Patient will demonstrate a 10% improvement as per the FOTO. Ongoing     Long Term Goals: 6 weeks   1.) Patient will demonstrate independence in compliance and technique of home exercise program provided as per teach-back method of assessment. Ongoing  2.) Patient will achieve and maintain 0 degrees of active and passive knee extension so as to improve normal terminal knee extension during ambulation. Ongoing  3.) Patient will achieve 120 degrees of knee flexion to demonstrate improvement in functional knee range of motion. Ongoing  4.) Patient will ambulate for 300 feet with LRAD and with normal gait mechanics to demonstrate improved functional mobility. Ongoing  5.) Patient will demonstrate a 30% improvement as per the FOTO. Ongoing    PLAN   Plan of Care Expiration: 3/1/2023    PN/Reeval next visit to determine updated POC.      Nati Pepe, PTA

## 2023-03-08 ENCOUNTER — CLINICAL SUPPORT (OUTPATIENT)
Dept: REHABILITATION | Facility: HOSPITAL | Age: 68
End: 2023-03-08
Attending: ORTHOPAEDIC SURGERY
Payer: MEDICARE

## 2023-03-08 DIAGNOSIS — M17.12 PRIMARY OSTEOARTHRITIS OF LEFT KNEE: Primary | ICD-10-CM

## 2023-03-08 DIAGNOSIS — M62.81 QUADRICEPS WEAKNESS: ICD-10-CM

## 2023-03-08 PROCEDURE — 97112 NEUROMUSCULAR REEDUCATION: CPT | Mod: PO

## 2023-03-08 PROCEDURE — 97140 MANUAL THERAPY 1/> REGIONS: CPT | Mod: PO

## 2023-03-08 NOTE — PROGRESS NOTES
[facility]   Physical Therapy Treatment Note and Update to Plan of Care    Name: Sy Aguiar  Clinic Number: [id number]    Therapy Diagnosis:   Encounter Diagnoses   Name Primary?    Primary osteoarthritis of left knee Yes    Quadriceps weakness        Physician: Urbano Vigil MD  Visit Date: 3/8/2023  Physician Orders: PT Eval and Treat   Medical Diagnosis from Referral:   M25.562,G89.29 (ICD-10-CM) - Chronic pain of left knee   M17.12 (ICD-10-CM) - Primary osteoarthritis of left knee   M62.81 (ICD-10-CM) - Quadriceps weakness   I10 (ICD-10-CM) - Hypertension, unspecified type   Evaluation Date: 1/10/2023  Authorization Period Expiration: None  Plan of Care Expiration: 3/1/2023 **Updated to 4/8/2023  Most Recent Progress Note: 1/30/2023  Visit # / Visits authorized: 11/15  FOTO: 1/3  Precautions: Standard, HTN **History of Left Quad Tendon Repair**  PTA Visit #: 2/5    Time In: 1:00 pm  Time Out: 1:55 pm  Total Billable Time: 30 minutes  SUBJECTIVE   Patient reports: he is feeling pretty good. He has been walking and riding his bike more, and was out of town again until this week.  He was not very compliant with his home exercise program.  Response to previous treatment: improved   Functional change: progressing  Pain: 0/10  Location: Left knee    OBJECTIVE      Left   Knee Flexion 105 degrees (108 degrees passively)   Knee Extension -5 degrees (0 degrees passively)     Single-Leg Raise: Good quadriceps contraction with ability to complete 10 repetitions without quadriceps lag.    Treatment   Sy received the treatments listed below:      therapeutic exercises to develop strength, endurance, ROM, flexibility, posture, and core stabilization for 30 minutes including:  Objective measures taken (see above)  Upright Bike, Seat 13, Level 7 (started at Level 2) - 8 min  Knee Extension with Heel Prop, 5 minutes with 7# above and below knee  Straight-Leg Raise, HOB elevated, 3x10 (cueing  "for proper technique)  Long Sit Calf Stretch w/ strap 30" hold 3x   Shuttle Double Leg Press (2 cords loaded from the bottom, 3 cords loaded from the top) 2 minutes     manual therapy techniques: Joint mobilizations were applied for 08 minutes, including:  Patellar Mobilizations, all planes  Tibiofemoral Joint Posterior Glides with 1/2 bolster  Knee Overpressure into Extension, 10x10" hold    neuromuscular re-education activities to improve: Kinesthetic, Proprioception, and Sense for 00 minutes. The following activities were included:    therapeutic activities to improve functional performance for 15 minutes, including:  Forward Step-Over, 5-6" hurdles, 4 trials, down standing on right leg; back stepping on left leg  Forward Step-Up, 6" step with 2 finger hand-hold on HOB  Lateral Step Up, 6" step with bilateral hand-hold assistance    Patient Education and Home Exercises     Home Exercises Provided and Patient Education Provided     Education provided:   - Importance of HEP compliance    Written Home Exercises Provided: Patient instructed to cont prior HEP. Exercises were reviewed and Sy was able to demonstrate them prior to the end of the session.  Sy demonstrated good  understanding of the education provided. See EMR under Patient Instructions for exercises provided during therapy sessions.     ASSESSMENT   Patient exhibits 0-105 degrees of range of motion this visit. Patellofemoral range of motion is within normal limits in medial/lateral glide, with continued restrictions in superior and inferior glide as well as joint restriction into posterior glide of the tibiofemoral joint. He demonstrates improved quadriceps contraction in both isolated fashion as well as with functional strengthening.    Sy is progressing well towards his goals  Pt prognosis is Excellent.     Pt will continue to benefit from skilled outpatient physical therapy to address the deficits listed in the problem list box on initial " evaluation, provide pt/family education and to maximize pt's level of independence in the home and community environment.     Pt's spiritual, cultural and educational needs considered and pt agreeable to plan of care and goals.     Anticipated barriers to physical therapy: None    Goals:  Short Term Goals: 2 weeks   1.) Patient will demonstrate independence in compliance and technique of home exercise program provided as per teach-back method of assessment. Met and Ongoing  2.) Patient will achieve 0 degrees of active and passive knee extension so as to improve normal terminal knee extension during ambulation. Met  3.) Patient will achieve 90 degrees of knee flexion to demonstrate improvement in functional knee range of motion. Met  4.) Patient will complete 10 straight-leg raises without knee extension lag to demonstrate improved quadriceps function and endurance. Met  5.) Patient will demonstrate a 10% improvement as per the FOTO. Ongoing     Long Term Goals: 6 weeks   1.) Patient will demonstrate independence in compliance and technique of home exercise program provided as per teach-back method of assessment. Ongoing  2.) Patient will achieve and maintain 0 degrees of active and passive knee extension so as to improve normal terminal knee extension during ambulation.Met  3.) Patient will achieve 120 degrees of knee flexion to demonstrate improvement in functional knee range of motion. Ongoing  4.) Patient will ambulate for 300 feet with LRAD and with normal gait mechanics to demonstrate improved functional mobility Met  5.) Patient will demonstrate a 30% improvement as per the FOTO. Ongoing    PLAN   Plan of Care Expiration: 4/8/2023  Continue as per plan of care.  Becky Ayon PT, DPT  Board Certified in Orthopedic Physical Therapy

## 2023-03-10 ENCOUNTER — CLINICAL SUPPORT (OUTPATIENT)
Dept: REHABILITATION | Facility: HOSPITAL | Age: 68
End: 2023-03-10
Payer: MEDICARE

## 2023-03-10 DIAGNOSIS — M17.12 PRIMARY OSTEOARTHRITIS OF LEFT KNEE: Primary | ICD-10-CM

## 2023-03-10 DIAGNOSIS — M62.81 QUADRICEPS WEAKNESS: ICD-10-CM

## 2023-03-10 PROCEDURE — 97530 THERAPEUTIC ACTIVITIES: CPT | Mod: PO

## 2023-03-10 PROCEDURE — 97140 MANUAL THERAPY 1/> REGIONS: CPT | Mod: PO

## 2023-03-10 NOTE — PROGRESS NOTES
[facility]   Physical Therapy Treatment Note    Name: Sy Aguiar  Clinic Number: [id number]    Therapy Diagnosis:   Encounter Diagnoses   Name Primary?    Primary osteoarthritis of left knee Yes    Quadriceps weakness        Physician: Urbano Vigil MD  Visit Date: 3/10/2023  Physician Orders: PT Eval and Treat   Medical Diagnosis from Referral:   M25.562,G89.29 (ICD-10-CM) - Chronic pain of left knee   M17.12 (ICD-10-CM) - Primary osteoarthritis of left knee   M62.81 (ICD-10-CM) - Quadriceps weakness   I10 (ICD-10-CM) - Hypertension, unspecified type   Evaluation Date: 1/10/2023  Authorization Period Expiration: None  Plan of Care Expiration: 3/1/2023 **Updated to 4/8/2023  Most Recent Progress Note: 1/30/2023  Visit # / Visits authorized: 12/15  FOTO: 1/3    Precautions: Standard, HTN **History of Left Quad Tendon Repair**  PTA Visit #: 2/5    Time In: 101pm  Time Out: 153pm  Total Billable Time: 25 minutes    SUBJECTIVE   Patient reports: that he continues to notice improvement in his pain weekly. He will be out of town for a few weeks starting on Tuesday.   He was not very compliant with his home exercise program.  Response to previous treatment: improved   Functional change: progressing  Pain: 0/10  Location: Left knee    OBJECTIVE      Left   Knee Flexion 105 degrees (108 degrees passively)   Knee Extension -5 degrees (0 degrees passively)     Single-Leg Raise: Good quadriceps contraction with ability to complete 10 repetitions without quadriceps lag.    Treatment     **PT was 1 on 1 with patient for 25 minutes today**    Sy received the treatments listed below:      therapeutic exercises to develop strength, endurance, ROM, flexibility, posture, and core stabilization for 20 minutes including:    Upright Bike, Seat 13, Level 7 (started at Level 2) - 8 min  Knee Extension with Heel Prop, 5 minutes with 7# above and below knee  Straight-Leg Raise, HOB elevated, 3x10 (cueing  "for proper technique)  LAQs with 3#, 3 x 8  Lateral walking with RTB around ankles, 5 laps at [facility]  Shuttle Single Leg Press, 75#, 3 x 10 on the L side only     manual therapy techniques: Joint mobilizations were applied for 10 minutes, including:    Patellar Mobilizations, all planes  Tibiofemoral Joint Posterior Glides with 1/2 bolster  Knee Overpressure into Extension, 10x10" hold    neuromuscular re-education activities to improve: Kinesthetic, Proprioception, and Sense for 10 minutes. The following activities were included:    SAQs with 3#, 3 x 10  SL stance on ground, 4 x 10" holds on each side  Tandem stance on foam pad + 10# KB pass around, 15 passes with each foot forward    therapeutic activities to improve functional performance for 10 minutes, including:    Forward Step-Up, 6" step with 2 finger hand-hold on HOB, 3 x 8  Sit to stands form chair holding 15#, 3 x 10    Not today:  Forward Step-Over, 5-6" hurdles, 4 trials, down standing on right leg; back stepping on left leg  Lateral Step Up, 6" step with bilateral hand-hold assistance    Patient Education and Home Exercises     Home Exercises Provided and Patient Education Provided     Education provided:   - Importance of HEP compliance    Written Home Exercises Provided: Patient instructed to cont prior HEP. Exercises were reviewed and Sy was able to demonstrate them prior to the end of the session.  Sy demonstrated good  understanding of the education provided. See EMR under Patient Instructions for exercises provided during therapy sessions.     ASSESSMENT     Sy presents to PT today missing just a few degrees of his knee extension but this was quickly restored following manual interventions and extension based exercises. He was challenged with quad and hip strengthening activities today. Dynamic balance activities were introduced today on unstable surfaces to challenged his stability. These exercises appropriately challenged him today.  "     Sy is progressing well towards his goals  Pt prognosis is Excellent.     Pt will continue to benefit from skilled outpatient physical therapy to address the deficits listed in the problem list box on initial evaluation, provide pt/family education and to maximize pt's level of independence in the home and community environment.     Pt's spiritual, cultural and educational needs considered and pt agreeable to plan of care and goals.     Anticipated barriers to physical therapy: None    Goals:  Short Term Goals: 2 weeks   1.) Patient will demonstrate independence in compliance and technique of home exercise program provided as per teach-back method of assessment. Met and Ongoing  2.) Patient will achieve 0 degrees of active and passive knee extension so as to improve normal terminal knee extension during ambulation. Met  3.) Patient will achieve 90 degrees of knee flexion to demonstrate improvement in functional knee range of motion. Met  4.) Patient will complete 10 straight-leg raises without knee extension lag to demonstrate improved quadriceps function and endurance. Met  5.) Patient will demonstrate a 10% improvement as per the FOTO. Ongoing     Long Term Goals: 6 weeks   1.) Patient will demonstrate independence in compliance and technique of home exercise program provided as per teach-back method of assessment. Ongoing  2.) Patient will achieve and maintain 0 degrees of active and passive knee extension so as to improve normal terminal knee extension during ambulation.Met  3.) Patient will achieve 120 degrees of knee flexion to demonstrate improvement in functional knee range of motion. Ongoing  4.) Patient will ambulate for 300 feet with LRAD and with normal gait mechanics to demonstrate improved functional mobility Met  5.) Patient will demonstrate a 30% improvement as per the FOTO. Ongoing    PLAN   Plan of Care Expiration: 4/8/2023  Continue as per plan of care.    Marlon Taylor PT, DPT  Board  Certified in Orthopedic Physical Therapy

## 2023-03-27 ENCOUNTER — CLINICAL SUPPORT (OUTPATIENT)
Dept: REHABILITATION | Facility: HOSPITAL | Age: 68
End: 2023-03-27
Payer: MEDICARE

## 2023-03-27 DIAGNOSIS — M17.12 PRIMARY OSTEOARTHRITIS OF LEFT KNEE: Primary | ICD-10-CM

## 2023-03-27 DIAGNOSIS — M62.81 QUADRICEPS WEAKNESS: ICD-10-CM

## 2023-03-27 PROCEDURE — 97140 MANUAL THERAPY 1/> REGIONS: CPT | Mod: PO

## 2023-03-27 PROCEDURE — 97110 THERAPEUTIC EXERCISES: CPT | Mod: PO

## 2023-03-27 PROCEDURE — 97530 THERAPEUTIC ACTIVITIES: CPT | Mod: PO

## 2023-03-27 NOTE — PROGRESS NOTES
[facility]   Physical Therapy Treatment Note and Discharge Note    Name: Sy Aguiar  Clinic Number: [id number]    Therapy Diagnosis:   Encounter Diagnoses   Name Primary?    Primary osteoarthritis of left knee Yes    Quadriceps weakness      Physician: Urbano Vigil MD  Visit Date: 3/27/2023  Physician Orders: PT Eval and Treat   Medical Diagnosis from Referral:   M25.562,G89.29 (ICD-10-CM) - Chronic pain of left knee   M17.12 (ICD-10-CM) - Primary osteoarthritis of left knee   M62.81 (ICD-10-CM) - Quadriceps weakness   I10 (ICD-10-CM) - Hypertension, unspecified type   Evaluation Date: 1/10/2023  Authorization Period Expiration: None  Plan of Care Expiration: 3/1/2023 **Updated to 4/8/2023  Most Recent Progress Note: 1/30/2023  Visit # / Visits authorized: 13/15  FOTO: 1/3  Precautions: Standard, HTN **History of Left Quad Tendon Repair**  PTA Visit #: 2/5  Time In: 11:00 am  Time Out: 11:53 pm  Total Billable Time: 53  SUBJECTIVE   Patient reports: just getting back from a few weeks of traveling, and he will be leaving again tomorrow. He reports that he still feels some tightness in the back of his knee, but is not limited in any of his activities.  He was somewhat compliant with his home exercise program.  Response to previous treatment: improved   Functional change: progressing  Pain: 0/10  Location: Left knee    OBJECTIVE      Left   Knee Flexion 113 degrees (- degrees passively)   Knee Extension -2 degrees (0 degrees passively)   Single-Leg Raise: Good quadriceps contraction with ability to complete 10 repetitions without quadriceps lag.    Balance Assessment:   Evaluation   Single Limb Stance R LE 5  (<10 sec = HIGH FALL RISK)   Single Limb Stance L LE 3  (<10 sec = HIGH FALL RISK)      Evaluation   Timed Up and Go 12 seconds (first trial)  9 seconds (second trial)  <10 seconds = Normal  >14 seconds = High Risk of Falls  <20 seconds = Independent for basic transfers, can go  "out alone  <30 seconds = Cannot go alone, requires gait aid     Table: Population Norms for TUG    Age  Average TUG    60 - 69 years  7.9 (+/- 0.9) seconds    70 - 79 years  7.7 (+/- 2.3) seconds    80 - 89 years  No device:11.0+/-2.2  With device:19.9+/-6.4seconds     years No device:14.7+/-7.9  With device:19.9+/-2.5seconds      Endurance Assessment:   Evaluation   Five Time Sit to Stand 10 seconds completed with no arms     Age Range Men Women   60-64 < 14  < 12   65-69 < 12  < 11   70-74 < 12 < 10   75-79 < 11 < 10   80-84 < 10 < 9   85-89 < 8  < 8   90-94 < 7  < 4                 Treatment   **PT was 1 on 1 with patient for 55 minutes today**  Sy received the treatments listed below:      therapeutic exercises to develop strength, endurance, ROM, flexibility, posture, and core stabilization for 20 minutes including:  Upright Bike, Seat 13, Level 7 (started at Level 2) - 7 minutes  Knee Extension with Heel Prop, 5 minutes with 7# above and below knee  Straight-Leg Raise, HOB elevated, 2x10, , 2nd set with 3# cuf weight (cueing for proper technique)  Clamshells, green band, 12x each side     manual therapy techniques: Joint mobilizations were applied for 23 minutes, including:  Objective measures taken (see above)  Patellar Mobilizations, all planes  Tibiofemoral Joint Posterior Glides with Internal Rotation Mobilizations with 1/2 bolster  Knee Overpressure into Extension, 10x10" hold    neuromuscular re-education activities to improve: Kinesthetic, Proprioception, and Sense for 10 minutes. The following activities were included:    therapeutic activities to improve functional performance for 10 minutes, including:  Sit to Stands form 19" chair, 2x8, no upper extremity assistance      Patient Education and Home Exercises     Home Exercises Provided and Patient Education Provided     Education provided:   - Importance of HEP compliance    Written Home Exercises Provided: Patient instructed to cont prior " HEP. Exercises were reviewed and Sy was able to demonstrate them prior to the end of the session.  Sy demonstrated good  understanding of the education provided. See EMR under Patient Instructions for exercises provided during therapy sessions.     ASSESSMENT   Sy has reached 0-113 degrees of knee range of motion at this visit, with good quadriceps strength in his ability to perform 10 straight-leg raises without quadriceps lag. On functional testing, he completes his TUG test in 9 seconds on his second trial, and 5 Time Sit to Stand within 12 seconds, below the cut-off for his age adjusted criteria. He reports no hinderance of activities of daily living or of playing music and his FOTO score this date is at 21% limitation, reading 48% limitation at evaluation. Although he does continue to remain challenged in his balance on single extremity, this is similar side to side and he feels he is ready for discharge to self-management at home.    Sy is progressing well towards his goals  Pt prognosis is Excellent.     Pt will continue to benefit from skilled outpatient physical therapy to address the deficits listed in the problem list box on initial evaluation, provide pt/family education and to maximize pt's level of independence in the home and community environment.     Pt's spiritual, cultural and educational needs considered and pt agreeable to plan of care and goals.     Anticipated barriers to physical therapy: None    Goals:  Short Term Goals: 2 weeks   1.) Patient will demonstrate independence in compliance and technique of home exercise program provided as per teach-back method of assessment. Met and Ongoing  2.) Patient will achieve 0 degrees of active and passive knee extension so as to improve normal terminal knee extension during ambulation. Met  3.) Patient will achieve 90 degrees of knee flexion to demonstrate improvement in functional knee range of motion. Met  4.) Patient will complete 10  straight-leg raises without knee extension lag to demonstrate improved quadriceps function and endurance. Met  5.) Patient will demonstrate a 10% improvement as per the FOTO. Met     Long Term Goals: 6 weeks   1.) Patient will demonstrate independence in compliance and technique of home exercise program provided as per teach-back method of assessment. Ongoing  2.) Patient will achieve and maintain 0 degrees of active and passive knee extension so as to improve normal terminal knee extension during ambulation.Met  3.) Patient will achieve 120 degrees of knee flexion to demonstrate improvement in functional knee range of motion. Not met (113 degrees)  4.) Patient will ambulate for 300 feet with LRAD and with normal gait mechanics to demonstrate improved functional mobility Met  5.) Patient will demonstrate a 30% improvement as per the FOTO. Met    PLAN   Plan of Care Expiration: 4/8/2023  Discharge to self-management at home.    Becky Ayon PT, DPT  Board Certified in Orthopedic Physical Therapy

## 2023-03-27 NOTE — PLAN OF CARE
[facility]   Physical Therapy Treatment Note and Discharge Note    Name: Sy Aguiar  Clinic Number: [id number]    Therapy Diagnosis:   Encounter Diagnoses   Name Primary?    Primary osteoarthritis of left knee Yes    Quadriceps weakness      Physician: Urbano Vigil MD  Visit Date: 3/27/2023  Physician Orders: PT Eval and Treat   Medical Diagnosis from Referral:   M25.562,G89.29 (ICD-10-CM) - Chronic pain of left knee   M17.12 (ICD-10-CM) - Primary osteoarthritis of left knee   M62.81 (ICD-10-CM) - Quadriceps weakness   I10 (ICD-10-CM) - Hypertension, unspecified type   Evaluation Date: 1/10/2023  Authorization Period Expiration: None  Plan of Care Expiration: 3/1/2023 **Updated to 4/8/2023  Most Recent Progress Note: 1/30/2023  Visit # / Visits authorized: 13/15  FOTO: 1/3  Precautions: Standard, HTN **History of Left Quad Tendon Repair**  PTA Visit #: 2/5  Time In: 11:00 am  Time Out: 11:53 pm  Total Billable Time: 53  SUBJECTIVE   Patient reports: just getting back from a few weeks of traveling, and he will be leaving again tomorrow. He reports that he still feels some tightness in the back of his knee, but is not limited in any of his activities.  He was somewhat compliant with his home exercise program.  Response to previous treatment: improved   Functional change: progressing  Pain: 0/10  Location: Left knee    OBJECTIVE      Left   Knee Flexion 113 degrees (- degrees passively)   Knee Extension -2 degrees (0 degrees passively)   Single-Leg Raise: Good quadriceps contraction with ability to complete 10 repetitions without quadriceps lag.    Balance Assessment:   Evaluation   Single Limb Stance R LE 5  (<10 sec = HIGH FALL RISK)   Single Limb Stance L LE 3  (<10 sec = HIGH FALL RISK)      Evaluation   Timed Up and Go 12 seconds (first trial)  9 seconds (second trial)  <10 seconds = Normal  >14 seconds = High Risk of Falls  <20 seconds = Independent for basic transfers, can go  "out alone  <30 seconds = Cannot go alone, requires gait aid     Table: Population Norms for TUG    Age  Average TUG    60 - 69 years  7.9 (+/- 0.9) seconds    70 - 79 years  7.7 (+/- 2.3) seconds    80 - 89 years  No device:11.0+/-2.2  With device:19.9+/-6.4seconds     years No device:14.7+/-7.9  With device:19.9+/-2.5seconds      Endurance Assessment:   Evaluation   Five Time Sit to Stand 10 seconds completed with no arms     Age Range Men Women   60-64 < 14  < 12   65-69 < 12  < 11   70-74 < 12 < 10   75-79 < 11 < 10   80-84 < 10 < 9   85-89 < 8  < 8   90-94 < 7  < 4                 Treatment   **PT was 1 on 1 with patient for 55 minutes today**  Sy received the treatments listed below:      therapeutic exercises to develop strength, endurance, ROM, flexibility, posture, and core stabilization for 20 minutes including:  Upright Bike, Seat 13, Level 7 (started at Level 2) - 7 minutes  Knee Extension with Heel Prop, 5 minutes with 7# above and below knee  Straight-Leg Raise, HOB elevated, 2x10, , 2nd set with 3# cuf weight (cueing for proper technique)  Clamshells, green band, 12x each side     manual therapy techniques: Joint mobilizations were applied for 23 minutes, including:  Objective measures taken (see above)  Patellar Mobilizations, all planes  Tibiofemoral Joint Posterior Glides with Internal Rotation Mobilizations with 1/2 bolster  Knee Overpressure into Extension, 10x10" hold    neuromuscular re-education activities to improve: Kinesthetic, Proprioception, and Sense for 10 minutes. The following activities were included:    therapeutic activities to improve functional performance for 10 minutes, including:  Sit to Stands form 19" chair, 2x8, no upper extremity assistance      Patient Education and Home Exercises     Home Exercises Provided and Patient Education Provided     Education provided:   - Importance of HEP compliance    Written Home Exercises Provided: Patient instructed to cont prior " HEP. Exercises were reviewed and Sy was able to demonstrate them prior to the end of the session.  Sy demonstrated good  understanding of the education provided. See EMR under Patient Instructions for exercises provided during therapy sessions.     ASSESSMENT   Sy has reached 0-113 degrees of knee range of motion at this visit, with good quadriceps strength in his ability to perform 10 straight-leg raises without quadriceps lag. On functional testing, he completes his TUG test in 9 seconds on his second trial, and 5 Time Sit to Stand within 12 seconds, below the cut-off for his age adjusted criteria. He reports no hinderance of activities of daily living or of playing music and his FOTO score this date is at 21% limitation, reading 48% limitation at evaluation. Although he does continue to remain challenged in his balance on single extremity, this is similar side to side and he feels he is ready for discharge to self-management at home.    Sy is progressing well towards his goals  Pt prognosis is Excellent.     Pt will continue to benefit from skilled outpatient physical therapy to address the deficits listed in the problem list box on initial evaluation, provide pt/family education and to maximize pt's level of independence in the home and community environment.     Pt's spiritual, cultural and educational needs considered and pt agreeable to plan of care and goals.     Anticipated barriers to physical therapy: None    Goals:  Short Term Goals: 2 weeks   1.) Patient will demonstrate independence in compliance and technique of home exercise program provided as per teach-back method of assessment. Met and Ongoing  2.) Patient will achieve 0 degrees of active and passive knee extension so as to improve normal terminal knee extension during ambulation. Met  3.) Patient will achieve 90 degrees of knee flexion to demonstrate improvement in functional knee range of motion. Met  4.) Patient will complete 10  straight-leg raises without knee extension lag to demonstrate improved quadriceps function and endurance. Met  5.) Patient will demonstrate a 10% improvement as per the FOTO. Met     Long Term Goals: 6 weeks   1.) Patient will demonstrate independence in compliance and technique of home exercise program provided as per teach-back method of assessment. Ongoing  2.) Patient will achieve and maintain 0 degrees of active and passive knee extension so as to improve normal terminal knee extension during ambulation.Met  3.) Patient will achieve 120 degrees of knee flexion to demonstrate improvement in functional knee range of motion. Not met (113 degrees)  4.) Patient will ambulate for 300 feet with LRAD and with normal gait mechanics to demonstrate improved functional mobility Met  5.) Patient will demonstrate a 30% improvement as per the FOTO. Met    PLAN   Plan of Care Expiration: 4/8/2023  Discharge to self-management at home.    Becky Ayon PT, DPT  Board Certified in Orthopedic Physical Therapy

## 2023-03-31 DIAGNOSIS — M25.562 LEFT KNEE PAIN, UNSPECIFIED CHRONICITY: Primary | ICD-10-CM

## 2023-04-04 ENCOUNTER — PATIENT MESSAGE (OUTPATIENT)
Dept: RESEARCH | Facility: HOSPITAL | Age: 68
End: 2023-04-04
Payer: MEDICARE

## 2023-04-11 ENCOUNTER — HOSPITAL ENCOUNTER (OUTPATIENT)
Dept: RADIOLOGY | Facility: HOSPITAL | Age: 68
Discharge: HOME | End: 2023-04-11
Attending: ORTHOPAEDIC SURGERY
Payer: MEDICARE

## 2023-04-11 ENCOUNTER — OFFICE VISIT (OUTPATIENT)
Dept: ORTHOPEDICS | Facility: CLINIC | Age: 68
End: 2023-04-11
Payer: MEDICARE

## 2023-04-11 VITALS
HEART RATE: 62 BPM | DIASTOLIC BLOOD PRESSURE: 76 MMHG | SYSTOLIC BLOOD PRESSURE: 119 MMHG | HEIGHT: 72 IN | BODY MASS INDEX: 39.02 KG/M2 | WEIGHT: 288.13 LBS

## 2023-04-11 DIAGNOSIS — Z96.652 AFTERCARE FOLLOWING LEFT KNEE JOINT REPLACEMENT SURGERY: Primary | ICD-10-CM

## 2023-04-11 DIAGNOSIS — Z47.1 AFTERCARE FOLLOWING LEFT KNEE JOINT REPLACEMENT SURGERY: Primary | ICD-10-CM

## 2023-04-11 DIAGNOSIS — M25.562 LEFT KNEE PAIN, UNSPECIFIED CHRONICITY: ICD-10-CM

## 2023-04-11 PROCEDURE — 1126F PR PAIN SEVERITY QUANTIFIED, NO PAIN PRESENT: ICD-10-PCS | Mod: CPTII,S$GLB,, | Performed by: ORTHOPAEDIC SURGERY

## 2023-04-11 PROCEDURE — 77073 BONE LENGTH STUDIES: CPT | Mod: TC,PN

## 2023-04-11 PROCEDURE — 99999 PR PBB SHADOW E&M-EST. PATIENT-LVL III: CPT | Mod: PBBFAC,,, | Performed by: ORTHOPAEDIC SURGERY

## 2023-04-11 PROCEDURE — 99999 PR PBB SHADOW E&M-EST. PATIENT-LVL III: ICD-10-PCS | Mod: PBBFAC,,, | Performed by: ORTHOPAEDIC SURGERY

## 2023-04-11 PROCEDURE — 3074F SYST BP LT 130 MM HG: CPT | Mod: CPTII,S$GLB,, | Performed by: ORTHOPAEDIC SURGERY

## 2023-04-11 PROCEDURE — 73562 X-RAY EXAM OF KNEE 3: CPT | Mod: TC,PN,LT

## 2023-04-11 PROCEDURE — 77073 XR HIP TO ANKLE: ICD-10-PCS | Mod: 26,,, | Performed by: INTERNAL MEDICINE

## 2023-04-11 PROCEDURE — 99214 OFFICE O/P EST MOD 30 MIN: CPT | Mod: S$GLB,,, | Performed by: ORTHOPAEDIC SURGERY

## 2023-04-11 PROCEDURE — 77073 BONE LENGTH STUDIES: CPT | Mod: 26,,, | Performed by: INTERNAL MEDICINE

## 2023-04-11 PROCEDURE — 3008F PR BODY MASS INDEX (BMI) DOCUMENTED: ICD-10-PCS | Mod: CPTII,S$GLB,, | Performed by: ORTHOPAEDIC SURGERY

## 2023-04-11 PROCEDURE — 3078F DIAST BP <80 MM HG: CPT | Mod: CPTII,S$GLB,, | Performed by: ORTHOPAEDIC SURGERY

## 2023-04-11 PROCEDURE — 99214 PR OFFICE/OUTPT VISIT, EST, LEVL IV, 30-39 MIN: ICD-10-PCS | Mod: S$GLB,,, | Performed by: ORTHOPAEDIC SURGERY

## 2023-04-11 PROCEDURE — 4010F ACE/ARB THERAPY RXD/TAKEN: CPT | Mod: CPTII,S$GLB,, | Performed by: ORTHOPAEDIC SURGERY

## 2023-04-11 PROCEDURE — 1101F PR PT FALLS ASSESS DOC 0-1 FALLS W/OUT INJ PAST YR: ICD-10-PCS | Mod: CPTII,S$GLB,, | Performed by: ORTHOPAEDIC SURGERY

## 2023-04-11 PROCEDURE — 3074F PR MOST RECENT SYSTOLIC BLOOD PRESSURE < 130 MM HG: ICD-10-PCS | Mod: CPTII,S$GLB,, | Performed by: ORTHOPAEDIC SURGERY

## 2023-04-11 PROCEDURE — 1159F PR MEDICATION LIST DOCUMENTED IN MEDICAL RECORD: ICD-10-PCS | Mod: CPTII,S$GLB,, | Performed by: ORTHOPAEDIC SURGERY

## 2023-04-11 PROCEDURE — 3288F PR FALLS RISK ASSESSMENT DOCUMENTED: ICD-10-PCS | Mod: CPTII,S$GLB,, | Performed by: ORTHOPAEDIC SURGERY

## 2023-04-11 PROCEDURE — 1101F PT FALLS ASSESS-DOCD LE1/YR: CPT | Mod: CPTII,S$GLB,, | Performed by: ORTHOPAEDIC SURGERY

## 2023-04-11 PROCEDURE — 4010F PR ACE/ARB THEARPY RXD/TAKEN: ICD-10-PCS | Mod: CPTII,S$GLB,, | Performed by: ORTHOPAEDIC SURGERY

## 2023-04-11 PROCEDURE — 1126F AMNT PAIN NOTED NONE PRSNT: CPT | Mod: CPTII,S$GLB,, | Performed by: ORTHOPAEDIC SURGERY

## 2023-04-11 PROCEDURE — 73562 XR KNEE 3 VIEW LEFT: ICD-10-PCS | Mod: 26,LT,, | Performed by: INTERNAL MEDICINE

## 2023-04-11 PROCEDURE — 3078F PR MOST RECENT DIASTOLIC BLOOD PRESSURE < 80 MM HG: ICD-10-PCS | Mod: CPTII,S$GLB,, | Performed by: ORTHOPAEDIC SURGERY

## 2023-04-11 PROCEDURE — 3288F FALL RISK ASSESSMENT DOCD: CPT | Mod: CPTII,S$GLB,, | Performed by: ORTHOPAEDIC SURGERY

## 2023-04-11 PROCEDURE — 3008F BODY MASS INDEX DOCD: CPT | Mod: CPTII,S$GLB,, | Performed by: ORTHOPAEDIC SURGERY

## 2023-04-11 PROCEDURE — 73562 X-RAY EXAM OF KNEE 3: CPT | Mod: 26,LT,, | Performed by: INTERNAL MEDICINE

## 2023-04-11 PROCEDURE — 1159F MED LIST DOCD IN RCRD: CPT | Mod: CPTII,S$GLB,, | Performed by: ORTHOPAEDIC SURGERY

## 2023-04-11 NOTE — PROGRESS NOTES
[facility] Clinic Note    In brief, Sy Aguiar is a 67 y.o. male status post left total knee arthroplasty (01/09/2023) here for his 3 month postop visit.  Patient is doing well.  He has completed physical therapy.  Has good range of motion.  No new trauma to the left knee.  Very pleased with his result.    Anterior knee pain: no  Has improved pain  Is in physical therapy  no  Problems w incision  no  Is  happy with result  yes  Opiod free: yes     PMH:   Past Medical History:   Diagnosis Date    Asthma     Hypertension        PSH:   Past Surgical History:   Procedure Laterality Date    BACK SURGERY      ELBOW SURGERY Right     KNEE SURGERY      quad muscle tear repair    TOTAL KNEE ARTHROPLASTY Left 1/9/2023    Procedure: ARTHROPLASTY, KNEE, TOTAL monoblock;  Surgeon: Urbano Vigil MD;  Location: [facility];  Service: Orthopedics;  Laterality: Left;  michele monoblock Trae notified cc  BMI - 0       SH:   Social History     Socioeconomic History    Marital status: Single   Tobacco Use    Smoking status: Former     Types: Cigarettes    Smokeless tobacco: Never   Substance and Sexual Activity    Alcohol use: Yes     Comment: socially    Drug use: Never       FH: History reviewed. No pertinent family history.    Allergies: Review of patient's allergies indicates:  No Known Allergies    ROS:  Constitutional- no fever, fatigue, weakness  Eye- no vision loss, eye redness, drainage, or pain  ENMT- no sore throat, ear pain, sinus pain/congestion, nasal congestion/drainage  Respiratory- no cough, wheezing, or shortness of breath  CV- no chest pain, no palpitations, no edema  GI- no N/V/D; no abdominal pain    Physical Exam:  Vitals:    04/11/23 1003   BP: 119/76   Pulse: 62       General: NAD  Cardio: RRR by peripheral pulse  Pulm: Normal WOB on room air, symmetric chest rise  Abd: Soft, NT/ND    MSK:  Left lower extremity   No effusion.  Incisions clean dry and intact without erythema or drainage.  Knee  range of motion from 0-110 degrees of flexion.  Quadriceps hamstring strength 5/5.  Sensation intact to light touch distally.  Motor intact to EHL/FHL/TA/GS.  Extremity warm well perfused.    Imaging:   X-ray left knee:  Interval left total knee arthroplasty without lucency or evidence of hardware failure.    Assessment:  Sy Aguiar is a 67 y.o. male status post left total knee arthroplasty (01/09/2023) here for his 3 month postop visit.     -Overall patient appears to be doing well and is happy with the result of the knee arthroplasty. They can continue activities as tolerated avoiding high impact activities.  I would like to see the patient back In 3 months with xrays.     Danie Beal MD  [facility] Orthopaedic Surgery  4/11/2023 11:20 AM

## 2023-07-13 ENCOUNTER — TELEPHONE (OUTPATIENT)
Dept: ORTHOPEDICS | Facility: CLINIC | Age: 68
End: 2023-07-13
Payer: MEDICARE

## 2023-07-13 NOTE — TELEPHONE ENCOUNTER
----- Message from Katrin King sent at 7/13/2023 10:22 AM CDT -----  Type:  Needs Medical Advice    Who Called: pt  Would the patient rather a call back or a response via [facility]? call  Best Call Back Number: [phone]  Additional Information: pt would like to speak with someone regarding upcoming appts that needs to be rescheduled.

## 2023-07-17 ENCOUNTER — OFFICE VISIT (OUTPATIENT)
Dept: URGENT CARE | Facility: CLINIC | Age: 68
End: 2023-07-17
Payer: MEDICARE

## 2023-07-17 VITALS
WEIGHT: 288.13 LBS | SYSTOLIC BLOOD PRESSURE: 125 MMHG | HEIGHT: 72 IN | HEART RATE: 87 BPM | RESPIRATION RATE: 16 BRPM | OXYGEN SATURATION: 95 % | BODY MASS INDEX: 39.02 KG/M2 | DIASTOLIC BLOOD PRESSURE: 75 MMHG | TEMPERATURE: 100 F

## 2023-07-17 DIAGNOSIS — U07.1 COVID-19 VIRUS DETECTED: ICD-10-CM

## 2023-07-17 DIAGNOSIS — R05.9 COUGH, UNSPECIFIED TYPE: ICD-10-CM

## 2023-07-17 DIAGNOSIS — U07.1 COVID-19: Primary | ICD-10-CM

## 2023-07-17 LAB
CTP QC/QA: YES
SARS-COV-2 AG RESP QL IA.RAPID: POSITIVE

## 2023-07-17 PROCEDURE — 87811 SARS CORONAVIRUS 2 ANTIGEN POCT, MANUAL READ: ICD-10-PCS | Mod: QW,S$GLB,,

## 2023-07-17 PROCEDURE — 87811 SARS-COV-2 COVID19 W/OPTIC: CPT | Mod: QW,S$GLB,,

## 2023-07-17 PROCEDURE — 99214 OFFICE O/P EST MOD 30 MIN: CPT | Mod: S$GLB,,,

## 2023-07-17 PROCEDURE — 99214 PR OFFICE/OUTPT VISIT, EST, LEVL IV, 30-39 MIN: ICD-10-PCS | Mod: S$GLB,,,

## 2023-07-17 RX ORDER — MELOXICAM 15 MG/1
TABLET ORAL
COMMUNITY
Start: 2023-02-21

## 2023-07-17 RX ORDER — ALBUTEROL SULFATE 90 UG/1
AEROSOL, METERED RESPIRATORY (INHALATION)
COMMUNITY
Start: 2023-02-18

## 2023-07-17 RX ORDER — PROMETHAZINE HYDROCHLORIDE AND DEXTROMETHORPHAN HYDROBROMIDE 6.25; 15 MG/5ML; MG/5ML
SYRUP ORAL
COMMUNITY
Start: 2023-02-18

## 2023-07-17 RX ORDER — CETIRIZINE HYDROCHLORIDE 10 MG/1
5 TABLET ORAL
COMMUNITY
Start: 2023-02-14

## 2023-07-17 RX ORDER — SERTRALINE HYDROCHLORIDE 50 MG/1
50 TABLET, FILM COATED ORAL
COMMUNITY
Start: 2023-06-13 | End: 2023-07-17

## 2023-07-17 RX ORDER — HYDROXYZINE HYDROCHLORIDE 10 MG/1
TABLET, FILM COATED ORAL
COMMUNITY
Start: 2023-02-21

## 2023-07-17 RX ORDER — METHYLPREDNISOLONE 4 MG/1
TABLET ORAL
COMMUNITY
Start: 2023-02-18 | End: 2023-07-17

## 2023-07-17 NOTE — PROGRESS NOTES
Subjective:      Patient ID: Sy Aguiar is a 67 y.o. male.    Vitals:  height is 6' (1.829 m) and weight is 130.7 kg (288 lb 2.3 oz). His oral temperature is 100 °F (37.8 °C). His blood pressure is 125/75 and his pulse is 87. His respiration is 16 and oxygen saturation is 95%.     Chief Complaint: Cough    67-year-old male patient reports of dry cough, headaches, runny nose, fatigue, body aches, headaches x2 days.  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  Patient reports taking DayQuil for his symptoms with relief.  Patient denies any fever, shortness of breath, chest pain, GI complaints, or any other associated symptoms.        Cough  This is a new problem. The current episode started in the past 7 days (2 days). The problem occurs constantly. The cough is Productive of sputum. Associated symptoms include chills, headaches and nasal congestion. Pertinent negatives include no chest pain, ear congestion, ear pain, eye redness, fever, heartburn, hemoptysis, myalgias, postnasal drip, rash, rhinorrhea, sore throat, shortness of breath, sweats, weight loss or wheezing. He has tried OTC cough suppressant (dayquil) for the symptoms. The treatment provided mild relief. His past medical history is significant for bronchitis. There is no history of asthma, COPD or environmental allergies.     Constitution: Positive for chills and fatigue. Negative for activity change, appetite change, sweating, fever, unexpected weight change and generalized weakness.   HENT:  Positive for congestion. Negative for ear pain, ear discharge, foreign body in ear, tinnitus, hearing loss, dental problem, nosebleeds, foreign body in nose, postnasal drip, sinus pain, sinus pressure, sore throat, trouble swallowing and voice change.    Neck: Negative for neck pain, neck stiffness, painful lymph nodes, neck swelling and degenerative disc disease.   Cardiovascular:  Negative for chest trauma, chest pain, leg swelling,  palpitations and sob on exertion.   Eyes:  Negative for eye trauma, foreign body in eye, eye discharge, eye itching, eye pain, eye redness and photophobia.   Respiratory:  Positive for cough. Negative for sleep apnea, chest tightness, sputum production, bloody sputum, COPD, shortness of breath, stridor, wheezing and asthma.    Gastrointestinal:  Negative for abdominal trauma, abdominal pain, abdominal bloating, history of abdominal surgery, nausea, vomiting, constipation and heartburn.   Endocrine: hair loss, cold intolerance and heat intolerance.   Genitourinary:  Negative for dysuria, frequency, urgency, urine decreased and flank pain.   Musculoskeletal:  Negative for pain, trauma, joint pain, joint swelling, abnormal ROM of joint and muscle ache.   Skin:  Negative for color change, pale, rash, wound, abrasion, laceration and lesion.   Allergic/Immunologic: Negative for environmental allergies, seasonal allergies, food allergies, eczema, asthma and immunocompromised state.   Neurological:  Positive for headaches. Negative for dizziness, history of vertigo, light-headedness, passing out, facial drooping, disorientation and altered mental status.   Hematologic/Lymphatic: Negative for swollen lymph nodes, easy bruising/bleeding, trouble clotting and history of blood clots. Does not bruise/bleed easily.   Psychiatric/Behavioral:  Negative for altered mental status, disorientation, confusion, agitation, nervous/anxious and sleep disturbance. The patient is not nervous/anxious.     Objective:     Physical Exam   Constitutional: He is oriented to person, place, and time. He appears well-developed. He is cooperative.  Non-toxic appearance. He does not appear ill. No distress.   HENT:   Head: Normocephalic and atraumatic.   Ears:   Right Ear: Hearing, tympanic membrane, external ear and ear canal normal.   Left Ear: Hearing, tympanic membrane, external ear and ear canal normal.   Nose: Congestion present. No mucosal edema,  rhinorrhea or nasal deformity. No epistaxis. Right sinus exhibits no maxillary sinus tenderness and no frontal sinus tenderness. Left sinus exhibits no maxillary sinus tenderness and no frontal sinus tenderness.   Mouth/Throat: Uvula is midline, oropharynx is clear and moist and mucous membranes are normal. No trismus in the jaw. Normal dentition. No uvula swelling. No oropharyngeal exudate, posterior oropharyngeal edema or posterior oropharyngeal erythema.   Eyes: Conjunctivae and lids are normal. No scleral icterus.   Neck: Trachea normal and phonation normal. Neck supple. No edema present. No erythema present. No neck rigidity present.   Cardiovascular: Normal rate, regular rhythm, normal heart sounds and normal pulses.   Pulmonary/Chest: Effort normal and breath sounds normal. No stridor. No respiratory distress. He has no decreased breath sounds. He has no wheezes. He has no rhonchi. He has no rales. He exhibits no tenderness.   Abdominal: Normal appearance.   Musculoskeletal: Normal range of motion.         General: No deformity. Normal range of motion.   Neurological: He is alert and oriented to person, place, and time. He exhibits normal muscle tone. Coordination normal.   Skin: Skin is warm, dry, intact, not diaphoretic and not pale.   Psychiatric: His speech is normal and behavior is normal. Judgment and thought content normal.   Nursing note and vitals reviewed.  Results for orders placed or performed in visit on 07/17/23   SARS Coronavirus 2 Antigen, POCT Manual Read   Result Value Ref Range    SARS Coronavirus 2 Antigen Positive (A) Negative     Acceptable Yes         Assessment:     1. COVID-19    2. Cough, unspecified type        Plan:       COVID-19  -     nirmatrelvir-ritonavir 300 mg (150 mg x 2)-100 mg copackaged tablets (EUA); Take 3 tablets by mouth 2 (two) times daily for 5 days. Each dose contains 2 nirmatrelvir (pink tablets) and 1 ritonavir (white tablet). Take all 3 tablets  together  Dispense: 30 tablet; Refill: 0    Cough, unspecified type  -     SARS Coronavirus 2 Antigen, POCT Manual Read          Medical Decision Making:   Urgent Care Management:  Patient is requesting Paxlovid medication due to COVID positive test.  Patient reports he is currently taking trazodone and losartan which is contraindicated.  Patient reports he will discontinue those medications while taking Paxlovid.  Patient reports his blood pressure is under control and usually takes med as needed.  Patient given strict ER precautions if symptoms worsens.  Additional MDM:     Heart Failure Score:   COPD = No

## 2023-07-17 NOTE — PATIENT INSTRUCTIONS
Take Paxlovid as prescribed.  Please do not take your trazodone and your losartan as discussed in the clinic due to contraindications of Paxlovid medication.  Continue DayQuil for your symptoms.  Please quarantine for 5 days.  You may return to normal activity once your fever free for 24 hours without the use of Tylenol or ibuprofen.    What care is needed at home?     Drink lots of water, juice, or broth to replace fluids lost from a fever.  You may use cool mist humidifiers to help ease congestion and coughing.  Use 2 to 3 pillows to prop yourself up when you lie down to make it easier to breathe and sleep.  Do not smoke and do not drink beer, wine, and mixed drinks (alcohol).  To lower the chance of passing the infection to others, get a COVID-19 vaccine after your infection has resolved.  If you have not been fully vaccinated:  Wear a mask over your mouth and nose if you are around others who are not sick. Cloth masks work best if they have more than one layer of fabric.  Wash your hands often.  Stay home in a separate room, if possible, away from others. Only go out to get medical care.  Use a separate bathroom if possible.  Do not make food for others.  What follow-up care is needed?   Your doctor may ask you to make visits to the office to check on your progress. Be sure to keep these visits. Make sure you wear a mask at these visits.  If you can, tell the staff you have COVID-19 ahead of time so they can take extra care to stop the disease from spreading.  It may take a few weeks before your health returns to normal.    Will physical activity be limited?   You may have to limit your physical activity. Talk to your doctor about the right amount of activity for you. If you have been very sick with COVID-19, it can take some time to get your strength back.  Will there be any other care needed?   Doctors do not know how long you can pass the virus on to others after you are sick. This is why it is important to  stay in a separate room, if possible, when you are sick. For now, doctors are giving general guidelines for you to follow after you have been sick. Before you go around other people, you should:  Be fever free for 24 hours without taking any drugs to lower the fever  Have no symptoms of cough or shortness of breath  Wait at least 10 days after first having symptoms or your first positive test, and you need to be symptom free as above. Some experts suggest waiting 20 days if you have had a more severe infection.  Talk with your doctor about getting a COVID-19 vaccine.  What problems could happen?   Fluid loss. This is dehydration.  Short-term or long-term lung damage  Heart problems  Death  When do I need to call the doctor?   You are having so much trouble breathing that you can only say one or two words at a time.  You need to sit upright at all times to be able to breathe and/or cannot lie down.  You are very confused or cannot stay awake.  Your lips or skin start to turn blue or grey.  You think you might be having a medical emergency. Some examples of medical emergencies are:  Severe chest pain.  Not able to speak or move normally.  You have trouble breathing when talking or sitting still.  You have new shortness of breath.  You become weak or dizzy.  You have very dark urine or do not pass urine for more than 8 hours.  You have new or worsening COVID-19 symptoms like:  Fever  Cough  Feeling very tired  Shaking chills  Headache  Trouble swallowing  Throwing up  Loose stools  Reddish purple spots on your fingers or toes  - Rest.    - Drink plenty of fluids.    - Acetaminophen (tylenol) or Ibuprofen (advil,motrin) as directed as needed for fever/pain. Avoid tylenol if you have a history of liver disease. Do not take ibuprofen if you have a history of GI bleeding, kidney disease, or if you take blood thinners.     - Follow up with your PCP or specialty clinic as directed in the next 1-2 weeks if not improved or as  needed.  You can call [phone] to schedule an appointment with the appropriate provider.    - Go to the ER or seek medical attention immediately if you develop new or worsening symptoms.     - You must understand that you have received an Urgent Care treatment only and that you may be released before all of your medical problems are known or treated.   - You, the patient, will arrange for follow up care as instructed.   - If your condition worsens or fails to improve we recommend that you receive another evaluation at the ER immediately or contact your PCP to discuss your concerns or return here.

## 2023-08-17 ENCOUNTER — HOSPITAL ENCOUNTER (OUTPATIENT)
Dept: RADIOLOGY | Facility: HOSPITAL | Age: 68
Discharge: HOME | End: 2023-08-17
Attending: ORTHOPAEDIC SURGERY
Payer: MEDICARE

## 2023-08-17 ENCOUNTER — OFFICE VISIT (OUTPATIENT)
Dept: ORTHOPEDICS | Facility: CLINIC | Age: 68
End: 2023-08-17
Payer: MEDICARE

## 2023-08-17 VITALS
HEART RATE: 69 BPM | HEIGHT: 72 IN | BODY MASS INDEX: 39.77 KG/M2 | SYSTOLIC BLOOD PRESSURE: 134 MMHG | WEIGHT: 293.63 LBS | DIASTOLIC BLOOD PRESSURE: 78 MMHG

## 2023-08-17 DIAGNOSIS — Z47.1 AFTERCARE FOLLOWING LEFT KNEE JOINT REPLACEMENT SURGERY: ICD-10-CM

## 2023-08-17 DIAGNOSIS — Z47.1 AFTERCARE FOLLOWING LEFT KNEE JOINT REPLACEMENT SURGERY: Primary | ICD-10-CM

## 2023-08-17 DIAGNOSIS — Z96.652 AFTERCARE FOLLOWING LEFT KNEE JOINT REPLACEMENT SURGERY: ICD-10-CM

## 2023-08-17 DIAGNOSIS — Z96.652 AFTERCARE FOLLOWING LEFT KNEE JOINT REPLACEMENT SURGERY: Primary | ICD-10-CM

## 2023-08-17 PROCEDURE — 77073 BONE LENGTH STUDIES: CPT | Mod: TC,PN

## 2023-08-17 PROCEDURE — 3288F FALL RISK ASSESSMENT DOCD: CPT | Mod: CPTII,S$GLB,, | Performed by: ORTHOPAEDIC SURGERY

## 2023-08-17 PROCEDURE — 3008F BODY MASS INDEX DOCD: CPT | Mod: CPTII,S$GLB,, | Performed by: ORTHOPAEDIC SURGERY

## 2023-08-17 PROCEDURE — 1125F PR PAIN SEVERITY QUANTIFIED, PAIN PRESENT: ICD-10-PCS | Mod: CPTII,S$GLB,, | Performed by: ORTHOPAEDIC SURGERY

## 2023-08-17 PROCEDURE — 99999 PR PBB SHADOW E&M-EST. PATIENT-LVL III: ICD-10-PCS | Mod: PBBFAC,,, | Performed by: ORTHOPAEDIC SURGERY

## 2023-08-17 PROCEDURE — 3078F PR MOST RECENT DIASTOLIC BLOOD PRESSURE < 80 MM HG: ICD-10-PCS | Mod: CPTII,S$GLB,, | Performed by: ORTHOPAEDIC SURGERY

## 2023-08-17 PROCEDURE — 1125F AMNT PAIN NOTED PAIN PRSNT: CPT | Mod: CPTII,S$GLB,, | Performed by: ORTHOPAEDIC SURGERY

## 2023-08-17 PROCEDURE — 99214 PR OFFICE/OUTPT VISIT, EST, LEVL IV, 30-39 MIN: ICD-10-PCS | Mod: S$GLB,,, | Performed by: ORTHOPAEDIC SURGERY

## 2023-08-17 PROCEDURE — 3075F SYST BP GE 130 - 139MM HG: CPT | Mod: CPTII,S$GLB,, | Performed by: ORTHOPAEDIC SURGERY

## 2023-08-17 PROCEDURE — 1159F PR MEDICATION LIST DOCUMENTED IN MEDICAL RECORD: ICD-10-PCS | Mod: CPTII,S$GLB,, | Performed by: ORTHOPAEDIC SURGERY

## 2023-08-17 PROCEDURE — 1101F PT FALLS ASSESS-DOCD LE1/YR: CPT | Mod: CPTII,S$GLB,, | Performed by: ORTHOPAEDIC SURGERY

## 2023-08-17 PROCEDURE — 77073 XR HIP TO ANKLE: ICD-10-PCS | Mod: 26,,, | Performed by: RADIOLOGY

## 2023-08-17 PROCEDURE — 73562 XR KNEE 3 VIEW LEFT: ICD-10-PCS | Mod: 26,59,LT, | Performed by: RADIOLOGY

## 2023-08-17 PROCEDURE — 3078F DIAST BP <80 MM HG: CPT | Mod: CPTII,S$GLB,, | Performed by: ORTHOPAEDIC SURGERY

## 2023-08-17 PROCEDURE — 73562 X-RAY EXAM OF KNEE 3: CPT | Mod: TC,PN,LT

## 2023-08-17 PROCEDURE — 3288F PR FALLS RISK ASSESSMENT DOCUMENTED: ICD-10-PCS | Mod: CPTII,S$GLB,, | Performed by: ORTHOPAEDIC SURGERY

## 2023-08-17 PROCEDURE — 1101F PR PT FALLS ASSESS DOC 0-1 FALLS W/OUT INJ PAST YR: ICD-10-PCS | Mod: CPTII,S$GLB,, | Performed by: ORTHOPAEDIC SURGERY

## 2023-08-17 PROCEDURE — 3075F PR MOST RECENT SYSTOLIC BLOOD PRESS GE 130-139MM HG: ICD-10-PCS | Mod: CPTII,S$GLB,, | Performed by: ORTHOPAEDIC SURGERY

## 2023-08-17 PROCEDURE — 4010F PR ACE/ARB THEARPY RXD/TAKEN: ICD-10-PCS | Mod: CPTII,S$GLB,, | Performed by: ORTHOPAEDIC SURGERY

## 2023-08-17 PROCEDURE — 4010F ACE/ARB THERAPY RXD/TAKEN: CPT | Mod: CPTII,S$GLB,, | Performed by: ORTHOPAEDIC SURGERY

## 2023-08-17 PROCEDURE — 3008F PR BODY MASS INDEX (BMI) DOCUMENTED: ICD-10-PCS | Mod: CPTII,S$GLB,, | Performed by: ORTHOPAEDIC SURGERY

## 2023-08-17 PROCEDURE — 1159F MED LIST DOCD IN RCRD: CPT | Mod: CPTII,S$GLB,, | Performed by: ORTHOPAEDIC SURGERY

## 2023-08-17 PROCEDURE — 99214 OFFICE O/P EST MOD 30 MIN: CPT | Mod: S$GLB,,, | Performed by: ORTHOPAEDIC SURGERY

## 2023-08-17 PROCEDURE — 99999 PR PBB SHADOW E&M-EST. PATIENT-LVL III: CPT | Mod: PBBFAC,,, | Performed by: ORTHOPAEDIC SURGERY

## 2023-08-17 PROCEDURE — 73562 X-RAY EXAM OF KNEE 3: CPT | Mod: 26,59,LT, | Performed by: RADIOLOGY

## 2023-08-17 PROCEDURE — 77073 BONE LENGTH STUDIES: CPT | Mod: 26,,, | Performed by: RADIOLOGY

## 2023-08-17 NOTE — PROGRESS NOTES
Subjective:      Patient ID: Sy Aguiar is a 67 y.o. male.    Chief Complaint: Pain and Post-op Evaluation of the Left Knee    Patient is 6 months s/p  left primary total knee replacement  Anterior knee pain: No  Has improved pain  Is in physical therapy  No  Problems w incision  No  Is  happy with result  Yes  Opiod free: Yes  Occ post knee pain when on bike           Social History     Occupational History    Not on file   Tobacco Use    Smoking status: Former     Current packs/day: 0.00     Types: Cigarettes    Smokeless tobacco: Never   Substance and Sexual Activity    Alcohol use: Yes     Comment: socially    Drug use: Never    Sexual activity: Not on file      Review of Systems   Constitutional: Negative for diaphoresis.   HENT:  Negative for ear discharge, nosebleeds and stridor.    Eyes:  Negative for photophobia.   Cardiovascular:  Negative for syncope.   Respiratory:  Negative for hemoptysis, shortness of breath and wheezing.    Neurological:  Negative for tremors.   Psychiatric/Behavioral: Negative.           Objective:    General    Constitutional: He is oriented to person, place, and time. He appears well-developed and well-nourished.   HENT:   Head: Normocephalic and atraumatic.   Eyes: EOM are normal.   Pulmonary/Chest: Effort normal.   Neurological: He is alert and oriented to person, place, and time.   Psychiatric: He has a normal mood and affect. His behavior is normal. Judgment and thought content normal.     General Musculoskeletal Exam   Gait: normal         Left Knee Exam     Inspection   Erythema: absent  Scars: present  Swelling: absent  Effusion: absent  Deformity: absent  Bruising: absent    Tenderness   The patient is experiencing no tenderness.     Range of Motion   Extension:  0   Flexion:  120     Tests   Stability   PCL-Posterior Drawer: normal (0 to 2mm)  MCL - Valgus: normal (0 to 2mm)  LCL - Varus: normal (0 to 2mm)  Patella   Passive Patellar Tilt: neutral  Patellar  Tracking: normal    Other   Sensation: normal    Comments:  Incision well healed         Assessment:       1. Aftercare following left knee joint replacement surgery          Plan:       Overall patient appears to be doing well and is happy with the result of the knee arthroplasty. They can continue activities as tolerated avoiding high impact activities.  I would like to see the patient back In 6 months with xrays.

## 2023-12-28 DIAGNOSIS — M25.562 CHRONIC PAIN OF LEFT KNEE: ICD-10-CM

## 2023-12-28 DIAGNOSIS — Z47.1 AFTERCARE FOLLOWING LEFT KNEE JOINT REPLACEMENT SURGERY: Primary | ICD-10-CM

## 2023-12-28 DIAGNOSIS — G89.29 CHRONIC PAIN OF LEFT KNEE: ICD-10-CM

## 2023-12-28 DIAGNOSIS — Z96.652 AFTERCARE FOLLOWING LEFT KNEE JOINT REPLACEMENT SURGERY: Primary | ICD-10-CM

## 2024-01-09 ENCOUNTER — OFFICE VISIT (OUTPATIENT)
Dept: ORTHOPEDICS | Facility: CLINIC | Age: 69
End: 2024-01-09
Payer: MEDICARE

## 2024-01-09 ENCOUNTER — HOSPITAL ENCOUNTER (OUTPATIENT)
Dept: RADIOLOGY | Facility: HOSPITAL | Age: 69
Discharge: HOME | End: 2024-01-09
Attending: ORTHOPAEDIC SURGERY
Payer: MEDICARE

## 2024-01-09 VITALS
HEIGHT: 72 IN | DIASTOLIC BLOOD PRESSURE: 101 MMHG | SYSTOLIC BLOOD PRESSURE: 170 MMHG | HEART RATE: 60 BPM | WEIGHT: 303.38 LBS | BODY MASS INDEX: 41.09 KG/M2

## 2024-01-09 DIAGNOSIS — Z47.1 AFTERCARE FOLLOWING LEFT KNEE JOINT REPLACEMENT SURGERY: ICD-10-CM

## 2024-01-09 DIAGNOSIS — Z96.652 AFTERCARE FOLLOWING LEFT KNEE JOINT REPLACEMENT SURGERY: Primary | ICD-10-CM

## 2024-01-09 DIAGNOSIS — Z47.1 AFTERCARE FOLLOWING LEFT KNEE JOINT REPLACEMENT SURGERY: Primary | ICD-10-CM

## 2024-01-09 DIAGNOSIS — Z96.652 AFTERCARE FOLLOWING LEFT KNEE JOINT REPLACEMENT SURGERY: ICD-10-CM

## 2024-01-09 DIAGNOSIS — G89.29 CHRONIC PAIN OF LEFT KNEE: ICD-10-CM

## 2024-01-09 DIAGNOSIS — M25.562 CHRONIC PAIN OF LEFT KNEE: ICD-10-CM

## 2024-01-09 PROCEDURE — 99214 OFFICE O/P EST MOD 30 MIN: CPT | Mod: S$GLB,,, | Performed by: ORTHOPAEDIC SURGERY

## 2024-01-09 PROCEDURE — 99999 PR PBB SHADOW E&M-EST. PATIENT-LVL III: CPT | Mod: PBBFAC,,, | Performed by: ORTHOPAEDIC SURGERY

## 2024-01-09 PROCEDURE — 73562 X-RAY EXAM OF KNEE 3: CPT | Mod: 26,LT,, | Performed by: RADIOLOGY

## 2024-01-09 PROCEDURE — 73562 X-RAY EXAM OF KNEE 3: CPT | Mod: TC,FY,LT

## 2024-01-09 NOTE — PROGRESS NOTES
Subjective:      Patient ID: Sy Aguiar is a 68 y.o. male.    Chief Complaint: Pain of the Left Knee    Patient is 1 years s/p  left primary total knee replacement  Anterior knee pain: No  Has improved pain  Is in physical therapy  No  Problems w incision  No  Is  happy with result  Yes  Opiod free: Yes         Social History     Occupational History    Not on file   Tobacco Use    Smoking status: Former     Types: Cigarettes    Smokeless tobacco: Never   Substance and Sexual Activity    Alcohol use: Yes     Comment: socially    Drug use: Never    Sexual activity: Not on file      Review of Systems   Constitutional: Negative for diaphoresis.   HENT:  Negative for ear discharge, nosebleeds and stridor.    Eyes:  Negative for photophobia.   Cardiovascular:  Negative for syncope.   Respiratory:  Negative for hemoptysis, shortness of breath and wheezing.    Neurological:  Negative for tremors.   Psychiatric/Behavioral: Negative.           Objective:    General    Constitutional: He is oriented to person, place, and time. He appears well-developed and well-nourished.   HENT:   Head: Normocephalic and atraumatic.   Eyes: EOM are normal.   Pulmonary/Chest: Effort normal.   Neurological: He is alert and oriented to person, place, and time.   Psychiatric: He has a normal mood and affect. His behavior is normal. Judgment and thought content normal.     General Musculoskeletal Exam   Gait: normal         Left Knee Exam     Inspection   Erythema: absent  Scars: present  Swelling: absent  Effusion: absent  Deformity: absent  Bruising: absent    Tenderness   The patient is experiencing no tenderness.     Range of Motion   Extension:  0   Flexion:  110     Tests   Stability   PCL-Posterior Drawer: normal (0 to 2mm)  MCL - Valgus: normal (0 to 2mm)  LCL - Varus: normal (0 to 2mm)  Patella   Passive Patellar Tilt: neutral  Patellar Tracking: normal    Other   Sensation: normal    Comments:  Incision well healed          Assessment:       1. Aftercare following left knee joint replacement surgery          Plan:       Overall patient appears to be doing well and is happy with the result of the knee arthroplasty. They can continue activities as tolerated avoiding high impact activities.  I would like to see the patient back In 1 year with xrays.

## 2025-01-06 ENCOUNTER — OFFICE VISIT (OUTPATIENT)
Dept: URGENT CARE | Facility: CLINIC | Age: 70
End: 2025-01-06
Payer: MEDICARE

## 2025-01-06 VITALS
WEIGHT: 272 LBS | TEMPERATURE: 99 F | OXYGEN SATURATION: 99 % | HEIGHT: 72 IN | BODY MASS INDEX: 36.84 KG/M2 | SYSTOLIC BLOOD PRESSURE: 106 MMHG | HEART RATE: 52 BPM | DIASTOLIC BLOOD PRESSURE: 73 MMHG | RESPIRATION RATE: 18 BRPM

## 2025-01-06 DIAGNOSIS — S80.211A ABRASION, RIGHT KNEE, INITIAL ENCOUNTER: ICD-10-CM

## 2025-01-06 DIAGNOSIS — M25.551 RIGHT HIP PAIN: ICD-10-CM
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
## 2025-01-06 PROCEDURE — 73502 X-RAY EXAM HIP UNI 2-3 VIEWS: CPT | Mod: RT,S$GLB,, | Performed by: RADIOLOGY

## 2025-01-06 PROCEDURE — 73562 X-RAY EXAM OF KNEE 3: CPT | Mod: RT,S$GLB,, | Performed by: RADIOLOGY

## 2025-01-06 RX ORDER — MUPIROCIN 20 MG/G
OINTMENT TOPICAL 3 TIMES DAILY
Qty: 22 G | Refills: 0 | Status: SHIPPED | OUTPATIENT
Start: 2025-01-06

## 2025-01-06 RX ORDER — KETOROLAC TROMETHAMINE 30 MG/ML
30 INJECTION, SOLUTION INTRAMUSCULAR; INTRAVENOUS
Status: COMPLETED | OUTPATIENT
Start: 2025-01-06 | End: 2025-01-06

## 2025-01-06 RX ADMIN — KETOROLAC TROMETHAMINE 30 MG: 30 INJECTION, SOLUTION INTRAMUSCULAR; INTRAVENOUS at 06:01
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
## 2025-01-07 NOTE — PATIENT INSTRUCTIONS
- You must understand that you have received an Urgent Care treatment only and that you may be released before all of your medical problems are known or treated.   - You, the patient, will arrange for follow up care as instructed.   - If your condition worsens or fails to improve we recommend that you receive another evaluation at the ER immediately or contact your PCP to discuss your concerns.   - You can call [phone] or [phone] to help schedule an appointment with the appropriate provider.    Take OTC Tylenol 500-1000 mg 3 times per day. Max 4000 mg from all sources per day.   Rest as much as possible  Alternate heat and ice. Apply heat for 20-30 minutes, perform gentle range of motion exercises, and then apply ice for 15 minutes. Do this 3-4 times per day.    If you received an injection of toradol in the clinic today, DO NOT take any anti-inflammatory medications today. These include: Advil/Motrin (ibuprofen), Aleve (naproxen), Mobic (meloxicam).

## 2025-08-15 ENCOUNTER — OFFICE VISIT (OUTPATIENT)
Dept: URGENT CARE | Facility: CLINIC | Age: 70
End: 2025-08-15
Payer: MEDICARE

## 2025-08-15 VITALS
WEIGHT: 287.69 LBS | HEART RATE: 50 BPM | HEIGHT: 72 IN | SYSTOLIC BLOOD PRESSURE: 123 MMHG | DIASTOLIC BLOOD PRESSURE: 75 MMHG | BODY MASS INDEX: 38.97 KG/M2 | TEMPERATURE: 98 F | RESPIRATION RATE: 16 BRPM | OXYGEN SATURATION: 97 %

## 2025-08-15 DIAGNOSIS — M54.9 ACUTE LEFT-SIDED BACK PAIN, UNSPECIFIED BACK LOCATION: Primary | ICD-10-CM

## 2025-08-15 DIAGNOSIS — R52 PAIN: ICD-10-CM

## 2025-08-15 PROCEDURE — 71101 X-RAY EXAM UNILAT RIBS/CHEST: CPT | Mod: LT,S$GLB,, | Performed by: RADIOLOGY

## 2025-08-15 RX ORDER — OMEPRAZOLE 20 MG/1
CAPSULE, DELAYED RELEASE ORAL
COMMUNITY
End: 2025-08-15 | Stop reason: ALTCHOICE

## 2025-08-15 RX ORDER — TRAZODONE HYDROCHLORIDE 50 MG/1
50 TABLET ORAL NIGHTLY
COMMUNITY
Start: 2025-06-24

## 2025-08-15 RX ORDER — MONTELUKAST SODIUM 10 MG/1
TABLET ORAL
COMMUNITY
End: 2025-08-15 | Stop reason: ALTCHOICE

## 2025-08-15 RX ORDER — SEMAGLUTIDE 0.68 MG/ML
INJECTION, SOLUTION SUBCUTANEOUS
COMMUNITY

## 2025-08-15 RX ORDER — MELOXICAM 15 MG/1
1 TABLET ORAL DAILY
COMMUNITY
Start: 2024-12-04

## 2025-08-15 RX ORDER — METHYLPREDNISOLONE 4 MG/1
TABLET ORAL
COMMUNITY
End: 2025-08-15 | Stop reason: ALTCHOICE

## 2025-08-15 RX ORDER — AZITHROMYCIN 250 MG/1
TABLET, FILM COATED ORAL
COMMUNITY
End: 2025-08-15 | Stop reason: ALTCHOICE

## 2025-08-15 RX ORDER — FLUTICASONE PROPIONATE 50 MCG
SPRAY, SUSPENSION (ML) NASAL
COMMUNITY
End: 2025-08-15 | Stop reason: ALTCHOICE

## 2025-08-15 RX ORDER — CHLORHEXIDINE GLUCONATE ORAL RINSE 1.2 MG/ML
SOLUTION DENTAL
COMMUNITY
End: 2025-08-15 | Stop reason: ALTCHOICE

## 2025-08-15 RX ORDER — ALPRAZOLAM 0.5 MG/1
TABLET ORAL
COMMUNITY
End: 2025-08-15 | Stop reason: ALTCHOICE

## 2025-08-15 RX ORDER — AMOXICILLIN AND CLAVULANATE POTASSIUM 500; 125 MG/1; MG/1
TABLET, FILM COATED ORAL
COMMUNITY
End: 2025-08-15 | Stop reason: ALTCHOICE

## 2025-08-15 RX ORDER — SILDENAFIL 50 MG/1
50 TABLET, FILM COATED ORAL
COMMUNITY
Start: 2025-06-24

## 2025-08-15 RX ORDER — PREDNISONE 20 MG/1
TABLET ORAL
COMMUNITY
End: 2025-08-15 | Stop reason: ALTCHOICE

## 2025-08-15 RX ORDER — FLUTICASONE PROPIONATE 110 UG/1
AEROSOL, METERED RESPIRATORY (INHALATION)
COMMUNITY
End: 2025-08-15 | Stop reason: ALTCHOICE

## 2025-08-15 RX ORDER — CYCLOBENZAPRINE HCL 10 MG
TABLET ORAL
COMMUNITY

## 2025-08-15 RX ORDER — LOSARTAN POTASSIUM 25 MG/1
TABLET ORAL
COMMUNITY

## 2025-08-15 RX ORDER — SERTRALINE HYDROCHLORIDE 50 MG/1
50 TABLET, FILM COATED ORAL
COMMUNITY
Start: 2025-06-19

## 2025-08-15 RX ORDER — BENZONATATE 200 MG/1
CAPSULE ORAL
COMMUNITY
End: 2025-08-15 | Stop reason: ALTCHOICE

## 2025-08-15 RX ORDER — LIDOCAINE 50 MG/G
1 PATCH TOPICAL DAILY
Qty: 7 PATCH | Refills: 0 | Status: SHIPPED | OUTPATIENT
Start: 2025-08-15 | End: 2025-08-22

## (undated) DEVICE — SYR ONLY LUER LOCK 20CC

## (undated) DEVICE — SOL IRR NACL .9% 3000ML

## (undated) DEVICE — SYR 50CC LL

## (undated) DEVICE — HOOD T-5 TEAR AWAY STERILE

## (undated) DEVICE — NDL FILTER MICRON 18G 1 1/2

## (undated) DEVICE — TOURNIQUET SB QC SP 34X4IN

## (undated) DEVICE — SYR 10CC LUER LOCK

## (undated) DEVICE — ALCOHOL 70% ISOP W/GREEN 16OZ

## (undated) DEVICE — SUT STRATAFIX PDS 1 CTX 18IN

## (undated) DEVICE — PAD PREP 50/CA

## (undated) DEVICE — BLADE SAW RECIP 76MMX12.5MM

## (undated) DEVICE — ADHESIVE DERMABOND ADVANCED

## (undated) DEVICE — ADAPTER DUAL IRRIGATION

## (undated) DEVICE — INTERPULSE SET

## (undated) DEVICE — DRESSING AQUACEL AG ADV 3.5X12

## (undated) DEVICE — SEE MEDLINE ITEM 153151

## (undated) DEVICE — NDL 18GA X1 1/2 REG BEVEL

## (undated) DEVICE — Device

## (undated) DEVICE — DRAPE TIBURON ORTHOPEDIC SPLIT

## (undated) DEVICE — GLOVE BIOGEL PI MICRO INDIC 8

## (undated) DEVICE — PAD PREP CUFFED NS 24X48IN

## (undated) DEVICE — SUT VICRYL 1 OB 36 CTX

## (undated) DEVICE — CLOSURE SKIN STERI STRIP 1/2X4

## (undated) DEVICE — BATTERY INSTRUMENT

## (undated) DEVICE — DRAPE INCISE IOBAN 2 23X23IN

## (undated) DEVICE — MANIFOLD 4 PORT

## (undated) DEVICE — SCRUB 10% POVIDONE IODINE 4OZ

## (undated) DEVICE — ELECTRODE REM PLYHSV RETURN 9

## (undated) DEVICE — NDL 22GA X1 1/2 REG BEVEL

## (undated) DEVICE — GLOVE 7.5 PROTEXIS PI ORTHO PF

## (undated) DEVICE — DRAPE CASSETTE 20 X 40

## (undated) DEVICE — BLADE 90X13X1.27MM

## (undated) DEVICE — BLADE RMR PATELLA 35MM

## (undated) DEVICE — DRESSING COVER AQUACEL AG SURG

## (undated) DEVICE — KIT MX BNE CEM REVOLTN W/BRKWY

## (undated) DEVICE — CONTAINER SPECIMEN STRL 4OZ

## (undated) DEVICE — GAUZE SPONGE 4X4 12PLY

## (undated) DEVICE — DURAPREP SURG SCRUB 26ML

## (undated) DEVICE — CONTAINER SPECIMEN STR 3 0Z

## (undated) DEVICE — SYS CLSR DERMABOND PRINEO 22CM

## (undated) DEVICE — NOZZLE BNE INJ CEM FEM POST 65

## (undated) DEVICE — SPONGE LAP 18X18 PREWASHED

## (undated) DEVICE — GLOVE 8 PROTEXIS PI BLUE

## (undated) DEVICE — PIN PINABALL HEADLESS
Type: IMPLANTABLE DEVICE | Site: KNEE | Status: NON-FUNCTIONAL
Removed: 2019-12-09

## (undated) DEVICE — GLOVE BIOGEL ORTHOPEDIC 7.5

## (undated) DEVICE — BLADE REAMER PILOT HOLE 35MM

## (undated) DEVICE — YANKAUER OPEN TIP W/O VENT

## (undated) DEVICE — CARD UNIV KNEE NAVGTN SW-SCL L

## (undated) DEVICE — SUT CTD VICRYL 2.0

## (undated) DEVICE — COVER OVERHEAD SURG LT BLUE

## (undated) DEVICE — COVER BACK TBL HD 2-TIER 72IN

## (undated) DEVICE — SEE MEDLINE ITEM 157125